# Patient Record
Sex: MALE | Race: WHITE | NOT HISPANIC OR LATINO | Employment: OTHER | URBAN - METROPOLITAN AREA
[De-identification: names, ages, dates, MRNs, and addresses within clinical notes are randomized per-mention and may not be internally consistent; named-entity substitution may affect disease eponyms.]

---

## 2023-04-14 LAB
ALANINE AMINOTRANSFERASE (SGPT) (U/L) IN SER/PLAS: 27 U/L (ref 10–52)
ANION GAP IN SER/PLAS: 17 MMOL/L (ref 10–20)
CALCIUM (MG/DL) IN SER/PLAS: 9.7 MG/DL (ref 8.6–10.3)
CARBON DIOXIDE, TOTAL (MMOL/L) IN SER/PLAS: 23 MMOL/L (ref 21–32)
CHLORIDE (MMOL/L) IN SER/PLAS: 103 MMOL/L (ref 98–107)
CHOLESTEROL (MG/DL) IN SER/PLAS: 159 MG/DL (ref 0–199)
CHOLESTEROL IN HDL (MG/DL) IN SER/PLAS: 54.3 MG/DL
CHOLESTEROL/HDL RATIO: 2.9
CREATININE (MG/DL) IN SER/PLAS: 0.94 MG/DL (ref 0.5–1.3)
ERYTHROCYTE DISTRIBUTION WIDTH (RATIO) BY AUTOMATED COUNT: 14.5 % (ref 11.5–14.5)
ERYTHROCYTE MEAN CORPUSCULAR HEMOGLOBIN CONCENTRATION (G/DL) BY AUTOMATED: 32.5 G/DL (ref 32–36)
ERYTHROCYTE MEAN CORPUSCULAR VOLUME (FL) BY AUTOMATED COUNT: 95 FL (ref 80–100)
ERYTHROCYTES (10*6/UL) IN BLOOD BY AUTOMATED COUNT: 4.82 X10E12/L (ref 4.5–5.9)
GFR MALE: 84 ML/MIN/1.73M2
GLUCOSE (MG/DL) IN SER/PLAS: 70 MG/DL (ref 74–99)
HEMATOCRIT (%) IN BLOOD BY AUTOMATED COUNT: 45.8 % (ref 41–52)
HEMOGLOBIN (G/DL) IN BLOOD: 14.9 G/DL (ref 13.5–17.5)
LDL: 75 MG/DL (ref 0–99)
LEUKOCYTES (10*3/UL) IN BLOOD BY AUTOMATED COUNT: 7.9 X10E9/L (ref 4.4–11.3)
PLATELETS (10*3/UL) IN BLOOD AUTOMATED COUNT: 297 X10E9/L (ref 150–450)
POTASSIUM (MMOL/L) IN SER/PLAS: 3.7 MMOL/L (ref 3.5–5.3)
SODIUM (MMOL/L) IN SER/PLAS: 139 MMOL/L (ref 136–145)
THYROTROPIN (MIU/L) IN SER/PLAS BY DETECTION LIMIT <= 0.05 MIU/L: 1.54 MIU/L (ref 0.44–3.98)
TRIGLYCERIDE (MG/DL) IN SER/PLAS: 151 MG/DL (ref 0–149)
UREA NITROGEN (MG/DL) IN SER/PLAS: 33 MG/DL (ref 6–23)
VLDL: 30 MG/DL (ref 0–40)

## 2023-04-18 ENCOUNTER — OFFICE VISIT (OUTPATIENT)
Dept: PRIMARY CARE | Facility: CLINIC | Age: 78
End: 2023-04-18
Payer: MEDICARE

## 2023-04-18 VITALS
OXYGEN SATURATION: 97 % | DIASTOLIC BLOOD PRESSURE: 74 MMHG | TEMPERATURE: 98.4 F | WEIGHT: 215.2 LBS | HEART RATE: 77 BPM | SYSTOLIC BLOOD PRESSURE: 118 MMHG | RESPIRATION RATE: 16 BRPM | BODY MASS INDEX: 32.72 KG/M2

## 2023-04-18 DIAGNOSIS — M19.90 OSTEOARTHRITIS, UNSPECIFIED OSTEOARTHRITIS TYPE, UNSPECIFIED SITE: ICD-10-CM

## 2023-04-18 DIAGNOSIS — I73.9 PERIPHERAL VASCULAR DISEASE (CMS-HCC): Chronic | ICD-10-CM

## 2023-04-18 DIAGNOSIS — I10 ESSENTIAL HYPERTENSION WITH GOAL BLOOD PRESSURE LESS THAN 130/85: ICD-10-CM

## 2023-04-18 DIAGNOSIS — R93.1 AGATSTON CORONARY ARTERY CALCIUM SCORE GREATER THAN 400: ICD-10-CM

## 2023-04-18 DIAGNOSIS — E66.9 CLASS 1 OBESITY WITH SERIOUS COMORBIDITY AND BODY MASS INDEX (BMI) OF 32.0 TO 32.9 IN ADULT, UNSPECIFIED OBESITY TYPE: Chronic | ICD-10-CM

## 2023-04-18 DIAGNOSIS — K21.9 GASTROESOPHAGEAL REFLUX DISEASE WITHOUT ESOPHAGITIS: ICD-10-CM

## 2023-04-18 DIAGNOSIS — Z95.5 PRESENCE OF DRUG COATED STENT IN LAD CORONARY ARTERY: ICD-10-CM

## 2023-04-18 DIAGNOSIS — C61 PROSTATE CANCER (MULTI): Chronic | ICD-10-CM

## 2023-04-18 DIAGNOSIS — Z97.3 WEARS GLASSES: ICD-10-CM

## 2023-04-18 DIAGNOSIS — M23.92 INTERNAL DERANGEMENT OF LEFT KNEE: ICD-10-CM

## 2023-04-18 DIAGNOSIS — I25.118 CORONARY ARTERY DISEASE OF NATIVE ARTERY OF NATIVE HEART WITH STABLE ANGINA PECTORIS (CMS-HCC): Chronic | ICD-10-CM

## 2023-04-18 DIAGNOSIS — Z00.00 ROUTINE GENERAL MEDICAL EXAMINATION AT HEALTH CARE FACILITY: Primary | Chronic | ICD-10-CM

## 2023-04-18 DIAGNOSIS — D12.6 ADENOMATOUS POLYP OF COLON, UNSPECIFIED PART OF COLON: ICD-10-CM

## 2023-04-18 PROBLEM — S16.1XXA CERVICAL STRAIN: Status: ACTIVE | Noted: 2023-04-18

## 2023-04-18 PROBLEM — J30.9 ALLERGIC RHINITIS: Status: ACTIVE | Noted: 2023-04-18

## 2023-04-18 PROBLEM — M53.3 SACROILIAC PAIN: Status: ACTIVE | Noted: 2023-04-18

## 2023-04-18 PROBLEM — T81.81XA COMPLICATION OF VENTILATION THERAPY: Status: ACTIVE | Noted: 2023-04-18

## 2023-04-18 PROBLEM — K64.8 INTERNAL HEMORRHOIDS: Status: ACTIVE | Noted: 2023-04-18

## 2023-04-18 PROBLEM — J98.8 RESPIRATORY TRACT INFECTION DUE TO COVID-19 VIRUS: Status: ACTIVE | Noted: 2023-04-18

## 2023-04-18 PROBLEM — R12 HEARTBURN: Status: ACTIVE | Noted: 2023-04-18

## 2023-04-18 PROBLEM — R19.7 DIARRHEA: Status: ACTIVE | Noted: 2023-04-18

## 2023-04-18 PROBLEM — J32.9 SINUSITIS: Status: ACTIVE | Noted: 2023-04-18

## 2023-04-18 PROBLEM — L98.9 SKIN LESION: Status: ACTIVE | Noted: 2023-04-18

## 2023-04-18 PROBLEM — R51.9 FRONTAL HEADACHE: Status: ACTIVE | Noted: 2023-04-18

## 2023-04-18 PROBLEM — K44.9 HIATAL HERNIA: Status: ACTIVE | Noted: 2023-04-18

## 2023-04-18 PROBLEM — H53.71 GLARE SENSITIVITY: Status: ACTIVE | Noted: 2023-04-18

## 2023-04-18 PROBLEM — R51.9 HEAD ACHE: Status: ACTIVE | Noted: 2023-04-18

## 2023-04-18 PROBLEM — M94.0 COSTOCHONDRITIS: Status: ACTIVE | Noted: 2023-04-18

## 2023-04-18 PROBLEM — E87.6 HYPOKALEMIA: Status: ACTIVE | Noted: 2023-04-18

## 2023-04-18 PROBLEM — K43.9 VENTRAL HERNIA: Status: ACTIVE | Noted: 2023-04-18

## 2023-04-18 PROBLEM — E78.5 HYPERLIPIDEMIA: Status: ACTIVE | Noted: 2023-04-18

## 2023-04-18 PROBLEM — B35.4 TINEA CORPORIS: Status: ACTIVE | Noted: 2023-04-18

## 2023-04-18 PROBLEM — K40.90 RIGHT INGUINAL HERNIA: Status: ACTIVE | Noted: 2023-04-18

## 2023-04-18 PROBLEM — K59.09 CHRONIC CONSTIPATION: Status: ACTIVE | Noted: 2023-04-18

## 2023-04-18 PROBLEM — U07.1 RESPIRATORY TRACT INFECTION DUE TO COVID-19 VIRUS: Status: ACTIVE | Noted: 2023-04-18

## 2023-04-18 PROBLEM — R07.89 ATYPICAL CHEST PAIN: Status: ACTIVE | Noted: 2023-04-18

## 2023-04-18 PROBLEM — R22.41 MASS OF RIGHT LOWER EXTREMITY: Status: ACTIVE | Noted: 2023-04-18

## 2023-04-18 PROBLEM — G47.00 INSOMNIA: Status: ACTIVE | Noted: 2023-04-18

## 2023-04-18 PROBLEM — E66.3 OVERWEIGHT (BMI 25.0-29.9): Status: ACTIVE | Noted: 2023-04-18

## 2023-04-18 PROBLEM — H61.21 IMPACTED CERUMEN OF RIGHT EAR: Status: ACTIVE | Noted: 2023-04-18

## 2023-04-18 PROBLEM — M89.8X9 BONY GROWTH: Status: ACTIVE | Noted: 2023-04-18

## 2023-04-18 PROBLEM — N40.0 BENIGN ENLARGEMENT OF PROSTATE: Status: ACTIVE | Noted: 2023-04-18

## 2023-04-18 PROBLEM — R60.9 EDEMA: Status: ACTIVE | Noted: 2023-04-18

## 2023-04-18 PROBLEM — M89.8X6 TIBIAL MASS: Status: ACTIVE | Noted: 2023-04-18

## 2023-04-18 PROBLEM — M54.50 BACK PAIN, LUMBOSACRAL: Status: ACTIVE | Noted: 2023-04-18

## 2023-04-18 PROBLEM — R79.89 TSH ELEVATION: Status: ACTIVE | Noted: 2023-04-18

## 2023-04-18 PROBLEM — R10.13 ABDOMINAL PAIN, EPIGASTRIC: Status: ACTIVE | Noted: 2023-04-18

## 2023-04-18 PROBLEM — H26.9 EARLY CATARACTS, BILATERAL: Status: ACTIVE | Noted: 2023-04-18

## 2023-04-18 PROBLEM — J39.9 UPPER RESPIRATORY DISEASE: Status: ACTIVE | Noted: 2023-04-18

## 2023-04-18 PROBLEM — G47.33 OBSTRUCTIVE SLEEP APNEA SYNDROME: Status: ACTIVE | Noted: 2023-04-18

## 2023-04-18 PROBLEM — M54.32 SCIATICA OF LEFT SIDE: Status: ACTIVE | Noted: 2023-04-18

## 2023-04-18 PROCEDURE — 1159F MED LIST DOCD IN RCRD: CPT | Performed by: INTERNAL MEDICINE

## 2023-04-18 PROCEDURE — 3074F SYST BP LT 130 MM HG: CPT | Performed by: INTERNAL MEDICINE

## 2023-04-18 PROCEDURE — 1170F FXNL STATUS ASSESSED: CPT | Performed by: INTERNAL MEDICINE

## 2023-04-18 PROCEDURE — 3078F DIAST BP <80 MM HG: CPT | Performed by: INTERNAL MEDICINE

## 2023-04-18 PROCEDURE — G0444 DEPRESSION SCREEN ANNUAL: HCPCS | Performed by: INTERNAL MEDICINE

## 2023-04-18 PROCEDURE — G0439 PPPS, SUBSEQ VISIT: HCPCS | Performed by: INTERNAL MEDICINE

## 2023-04-18 PROCEDURE — 1036F TOBACCO NON-USER: CPT | Performed by: INTERNAL MEDICINE

## 2023-04-18 PROCEDURE — 99214 OFFICE O/P EST MOD 30 MIN: CPT | Performed by: INTERNAL MEDICINE

## 2023-04-18 PROCEDURE — 1160F RVW MEDS BY RX/DR IN RCRD: CPT | Performed by: INTERNAL MEDICINE

## 2023-04-18 RX ORDER — OMEPRAZOLE 40 MG/1
CAPSULE, DELAYED RELEASE ORAL
COMMUNITY
Start: 2013-09-06 | End: 2023-07-18 | Stop reason: SDUPTHER

## 2023-04-18 RX ORDER — CETIRIZINE HYDROCHLORIDE 5 MG/1
5 TABLET ORAL DAILY PRN
Status: SHIPPED | COMMUNITY
Start: 2023-04-18 | End: 2023-04-18 | Stop reason: DRUGHIGH

## 2023-04-18 RX ORDER — MOLNUPIRAVIR 200 MG/1
CAPSULE ORAL EVERY 12 HOURS
COMMUNITY
Start: 2022-07-23 | End: 2023-04-18 | Stop reason: ALTCHOICE

## 2023-04-18 RX ORDER — FAMOTIDINE 40 MG/1
TABLET, FILM COATED ORAL
COMMUNITY
Start: 2021-10-13 | End: 2023-10-10 | Stop reason: ALTCHOICE

## 2023-04-18 RX ORDER — LOSARTAN POTASSIUM 50 MG/1
1 TABLET ORAL DAILY
COMMUNITY
Start: 2020-10-20 | End: 2023-11-13 | Stop reason: SDUPTHER

## 2023-04-18 RX ORDER — TAMSULOSIN HYDROCHLORIDE 0.4 MG/1
1 CAPSULE ORAL DAILY
COMMUNITY
Start: 2022-05-25 | End: 2023-04-18 | Stop reason: ALTCHOICE

## 2023-04-18 RX ORDER — FLUTICASONE PROPIONATE 50 MCG
1 SPRAY, SUSPENSION (ML) NASAL 2 TIMES DAILY
COMMUNITY
Start: 2012-01-23

## 2023-04-18 RX ORDER — PREDNISONE 10 MG/1
TABLET ORAL
COMMUNITY
Start: 2022-12-16 | End: 2023-04-18 | Stop reason: ALTCHOICE

## 2023-04-18 RX ORDER — ASPIRIN 81 MG/1
1 TABLET ORAL DAILY
COMMUNITY
End: 2024-05-06 | Stop reason: HOSPADM

## 2023-04-18 RX ORDER — ACETAMINOPHEN 500 MG
1 TABLET ORAL DAILY
COMMUNITY
Start: 2013-10-02

## 2023-04-18 RX ORDER — CETIRIZINE HYDROCHLORIDE 5 MG/1
10 TABLET ORAL DAILY PRN
Status: SHIPPED | COMMUNITY
Start: 2023-04-18

## 2023-04-18 RX ORDER — CETIRIZINE HYDROCHLORIDE 5 MG/1
1 TABLET ORAL DAILY
COMMUNITY
End: 2023-04-18 | Stop reason: DRUGHIGH

## 2023-04-18 RX ORDER — ATORVASTATIN CALCIUM 80 MG/1
TABLET, FILM COATED ORAL
COMMUNITY
Start: 2020-11-06 | End: 2023-06-03

## 2023-04-18 RX ORDER — CIPROFLOXACIN 250 MG/1
1 TABLET, FILM COATED ORAL 2 TIMES DAILY
COMMUNITY
Start: 2023-01-25 | End: 2023-04-18 | Stop reason: ALTCHOICE

## 2023-04-18 ASSESSMENT — ENCOUNTER SYMPTOMS
DEPRESSION: 0
LOSS OF SENSATION IN FEET: 0
OCCASIONAL FEELINGS OF UNSTEADINESS: 0

## 2023-04-18 ASSESSMENT — ACTIVITIES OF DAILY LIVING (ADL)
FEEDING YOURSELF: INDEPENDENT
HEARING - LEFT EAR: FUNCTIONAL
JUDGMENT_ADEQUATE_SAFELY_COMPLETE_DAILY_ACTIVITIES: YES
WALKS IN HOME: INDEPENDENT
BATHING: INDEPENDENT
ADEQUATE_TO_COMPLETE_ADL: YES
DRESSING YOURSELF: INDEPENDENT
GROOMING: INDEPENDENT
PATIENT'S MEMORY ADEQUATE TO SAFELY COMPLETE DAILY ACTIVITIES?: YES
HEARING - RIGHT EAR: FUNCTIONAL
TOILETING: INDEPENDENT
ASSISTIVE_DEVICE: EYEGLASSES

## 2023-04-18 ASSESSMENT — PATIENT HEALTH QUESTIONNAIRE - PHQ9
1. LITTLE INTEREST OR PLEASURE IN DOING THINGS: NOT AT ALL
2. FEELING DOWN, DEPRESSED OR HOPELESS: NOT AT ALL
SUM OF ALL RESPONSES TO PHQ9 QUESTIONS 1 AND 2: 0

## 2023-04-18 NOTE — PROGRESS NOTES
Subjective   Reason for Visit: Ebenezer Campbell is an 77 y.o. male here for a Medicare Wellness visit.     Past Medical, Surgical, and Family History reviewed and updated in chart.         Wellness visit overall doing well-anticipating prostate cancer treatment-likely radiation to start at some point following his recent MRI and PET scan            Patient Care Team:  Oral Castellano MD as PCP - General  Oral Castellano MD as PCP - [facility] Attributed Provider     Review of Systems    Objective   Vitals:  /74 (BP Location: Left arm, Patient Position: Sitting)   Pulse 77   Temp 36.9 °C (98.4 °F)   Resp 16   Wt 97.6 kg (215 lb 3.2 oz)   SpO2 97%   BMI 32.72 kg/m²       Physical Exam  Vitals reviewed.   Constitutional:       Appearance: Normal appearance.   HENT:      Head: Normocephalic and atraumatic.   Eyes:      General: No scleral icterus.        Right eye: No discharge.         Left eye: No discharge.      Extraocular Movements: Extraocular movements intact.      Conjunctiva/sclera: Conjunctivae normal.      Pupils: Pupils are equal, round, and reactive to light.   Cardiovascular:      Rate and Rhythm: Normal rate and regular rhythm.      Pulses: Normal pulses.      Heart sounds: Normal heart sounds. No murmur heard.  Pulmonary:      Effort: Pulmonary effort is normal.      Breath sounds: Normal breath sounds. No wheezing or rhonchi.   Musculoskeletal:         General: No deformity or signs of injury. Normal range of motion.      Cervical back: Normal range of motion and neck supple. No rigidity or tenderness.   Lymphadenopathy:      Cervical: No cervical adenopathy.   Skin:     General: Skin is warm and dry.      Findings: No rash.   Neurological:      General: No focal deficit present.      Mental Status: He is alert and oriented to person, place, and time. Mental status is at baseline.      Cranial Nerves: No cranial nerve deficit.      Sensory: No sensory deficit.      Gait: Gait normal.   Psychiatric:          Mood and Affect: Mood normal.         Behavior: Behavior normal.         Thought Content: Thought content normal.         Judgment: Judgment normal.         Assessment/Plan   Problem List Items Addressed This Visit          Circulatory    Agatston coronary artery calcium score greater than 400    Coronary artery disease of native heart with stable angina pectoris (CMS/HCC)    Essential hypertension with goal blood pressure less than 130/85    Peripheral vascular disease (CMS/HCC)    Presence of drug coated stent in LAD coronary artery       Digestive    Adenomatous colon polyp    Esophageal reflux       Genitourinary    Prostate cancer (CMS/HCC)       Musculoskeletal    Internal derangement of left knee    Osteoarthritis       Other    Wears glasses     Other Visit Diagnoses       Routine general medical examination at health care facility  (Chronic)   -  Primary    Relevant Orders    1 Year Follow Up In [facility] - PCP - Wellness Exam    Class 1 obesity with serious comorbidity and body mass index (BMI) of 32.0 to 32.9 in adult, unspecified obesity type  (Chronic)                    He will follow-up with radiation oncology to review his recent MRI and PET scan and anticipate prostate cancer treatment subsequently    Cardiology care-he will continue with his cardiologist and continue his cardiac medication plan    Arthritis-analgesics as needed, and lifestyle efforts to avoid overuse encouraged. Follow up with new or persistent joint pain encouraged.

## 2023-04-19 PROBLEM — M94.0 COSTOCHONDRITIS: Status: RESOLVED | Noted: 2023-04-18 | Resolved: 2023-04-19

## 2023-04-19 PROBLEM — J98.8 RESPIRATORY TRACT INFECTION DUE TO COVID-19 VIRUS: Status: RESOLVED | Noted: 2023-04-18 | Resolved: 2023-04-19

## 2023-04-19 PROBLEM — R51.9 FRONTAL HEADACHE: Status: RESOLVED | Noted: 2023-04-18 | Resolved: 2023-04-19

## 2023-04-19 PROBLEM — U07.1 RESPIRATORY TRACT INFECTION DUE TO COVID-19 VIRUS: Status: RESOLVED | Noted: 2023-04-18 | Resolved: 2023-04-19

## 2023-04-19 PROBLEM — S16.1XXA CERVICAL STRAIN: Status: RESOLVED | Noted: 2023-04-18 | Resolved: 2023-04-19

## 2023-04-19 PROBLEM — R51.9 HEAD ACHE: Status: RESOLVED | Noted: 2023-04-18 | Resolved: 2023-04-19

## 2023-04-19 PROBLEM — M89.8X9 BONY GROWTH: Status: RESOLVED | Noted: 2023-04-18 | Resolved: 2023-04-19

## 2023-04-19 PROBLEM — R10.13 ABDOMINAL PAIN, EPIGASTRIC: Status: RESOLVED | Noted: 2023-04-18 | Resolved: 2023-04-19

## 2023-04-19 PROBLEM — G47.00 INSOMNIA: Status: RESOLVED | Noted: 2023-04-18 | Resolved: 2023-04-19

## 2023-04-25 LAB
ALANINE AMINOTRANSFERASE (SGPT) (U/L) IN SER/PLAS: 23 U/L (ref 10–52)
ALBUMIN (G/DL) IN SER/PLAS: 4.2 G/DL (ref 3.4–5)
ALKALINE PHOSPHATASE (U/L) IN SER/PLAS: 88 U/L (ref 33–136)
ANION GAP IN SER/PLAS: 13 MMOL/L (ref 10–20)
ASPARTATE AMINOTRANSFERASE (SGOT) (U/L) IN SER/PLAS: 22 U/L (ref 9–39)
BASOPHILS (10*3/UL) IN BLOOD BY AUTOMATED COUNT: 0.09 X10E9/L (ref 0–0.1)
BASOPHILS/100 LEUKOCYTES IN BLOOD BY AUTOMATED COUNT: 1.4 % (ref 0–2)
BILIRUBIN TOTAL (MG/DL) IN SER/PLAS: 0.7 MG/DL (ref 0–1.2)
CALCIUM (MG/DL) IN SER/PLAS: 9.8 MG/DL (ref 8.6–10.3)
CARBON DIOXIDE, TOTAL (MMOL/L) IN SER/PLAS: 28 MMOL/L (ref 21–32)
CHLORIDE (MMOL/L) IN SER/PLAS: 102 MMOL/L (ref 98–107)
CREATININE (MG/DL) IN SER/PLAS: 0.99 MG/DL (ref 0.5–1.3)
EOSINOPHILS (10*3/UL) IN BLOOD BY AUTOMATED COUNT: 0.28 X10E9/L (ref 0–0.4)
EOSINOPHILS/100 LEUKOCYTES IN BLOOD BY AUTOMATED COUNT: 4.5 % (ref 0–6)
ERYTHROCYTE DISTRIBUTION WIDTH (RATIO) BY AUTOMATED COUNT: 13.9 % (ref 11.5–14.5)
ERYTHROCYTE MEAN CORPUSCULAR HEMOGLOBIN CONCENTRATION (G/DL) BY AUTOMATED: 32 G/DL (ref 32–36)
ERYTHROCYTE MEAN CORPUSCULAR VOLUME (FL) BY AUTOMATED COUNT: 95 FL (ref 80–100)
ERYTHROCYTES (10*6/UL) IN BLOOD BY AUTOMATED COUNT: 4.57 X10E12/L (ref 4.5–5.9)
GFR MALE: 79 ML/MIN/1.73M2
GLUCOSE (MG/DL) IN SER/PLAS: 98 MG/DL (ref 74–99)
HEMATOCRIT (%) IN BLOOD BY AUTOMATED COUNT: 43.4 % (ref 41–52)
HEMOGLOBIN (G/DL) IN BLOOD: 13.9 G/DL (ref 13.5–17.5)
IMMATURE GRANULOCYTES/100 LEUKOCYTES IN BLOOD BY AUTOMATED COUNT: 0.2 % (ref 0–0.9)
LEUKOCYTES (10*3/UL) IN BLOOD BY AUTOMATED COUNT: 6.2 X10E9/L (ref 4.4–11.3)
LYMPHOCYTES (10*3/UL) IN BLOOD BY AUTOMATED COUNT: 2.31 X10E9/L (ref 0.8–3)
LYMPHOCYTES/100 LEUKOCYTES IN BLOOD BY AUTOMATED COUNT: 37.1 % (ref 13–44)
MONOCYTES (10*3/UL) IN BLOOD BY AUTOMATED COUNT: 0.67 X10E9/L (ref 0.05–0.8)
MONOCYTES/100 LEUKOCYTES IN BLOOD BY AUTOMATED COUNT: 10.8 % (ref 2–10)
NEUTROPHILS (10*3/UL) IN BLOOD BY AUTOMATED COUNT: 2.87 X10E9/L (ref 1.6–5.5)
NEUTROPHILS/100 LEUKOCYTES IN BLOOD BY AUTOMATED COUNT: 46 % (ref 40–80)
PLATELETS (10*3/UL) IN BLOOD AUTOMATED COUNT: 301 X10E9/L (ref 150–450)
POTASSIUM (MMOL/L) IN SER/PLAS: 3.7 MMOL/L (ref 3.5–5.3)
PROSTATE SPECIFIC AG (NG/ML) IN SER/PLAS: 8.37 NG/ML (ref 0–4)
PROTEIN TOTAL: 6.8 G/DL (ref 6.4–8.2)
SODIUM (MMOL/L) IN SER/PLAS: 139 MMOL/L (ref 136–145)
TESTOSTERONE (NG/DL) IN SER/PLAS: 827 NG/DL (ref 240–1000)
UREA NITROGEN (MG/DL) IN SER/PLAS: 22 MG/DL (ref 6–23)

## 2023-06-02 DIAGNOSIS — E78.5 HYPERLIPIDEMIA, UNSPECIFIED: ICD-10-CM

## 2023-06-02 DIAGNOSIS — E78.5 DYSLIPIDEMIA, GOAL LDL BELOW 70: Primary | ICD-10-CM

## 2023-06-03 RX ORDER — ATORVASTATIN CALCIUM 80 MG/1
TABLET, FILM COATED ORAL
Qty: 90 TABLET | Refills: 3 | Status: SHIPPED | OUTPATIENT
Start: 2023-06-03 | End: 2024-05-23

## 2023-06-26 LAB
APPEARANCE, URINE: CLEAR
BILIRUBIN, URINE: NEGATIVE
BLOOD, URINE: NEGATIVE
COLOR, URINE: YELLOW
GLUCOSE, URINE: NEGATIVE MG/DL
KETONES, URINE: NEGATIVE MG/DL
LEUKOCYTE ESTERASE, URINE: ABNORMAL
NITRITE, URINE: NEGATIVE
PH, URINE: 7 (ref 5–8)
PROTEIN, URINE: NEGATIVE MG/DL
RBC, URINE: 1 /HPF (ref 0–5)
SPECIFIC GRAVITY, URINE: 1.01 (ref 1–1.03)
UROBILINOGEN, URINE: <2 MG/DL (ref 0–1.9)
WBC, URINE: 4 /HPF (ref 0–5)

## 2023-06-27 LAB — URINE CULTURE: NO GROWTH

## 2023-07-18 DIAGNOSIS — K21.9 GASTROESOPHAGEAL REFLUX DISEASE WITHOUT ESOPHAGITIS: Primary | ICD-10-CM

## 2023-07-18 RX ORDER — OMEPRAZOLE 40 MG/1
40 CAPSULE, DELAYED RELEASE ORAL
Qty: 30 CAPSULE | Refills: 0 | Status: SHIPPED | OUTPATIENT
Start: 2023-07-18 | End: 2023-08-11

## 2023-07-25 LAB — PROSTATE SPECIFIC AG (NG/ML) IN SER/PLAS: 0.12 NG/ML (ref 0–4)

## 2023-08-11 DIAGNOSIS — K21.9 GASTROESOPHAGEAL REFLUX DISEASE WITHOUT ESOPHAGITIS: ICD-10-CM

## 2023-08-11 RX ORDER — OMEPRAZOLE 40 MG/1
40 CAPSULE, DELAYED RELEASE ORAL
Qty: 90 CAPSULE | Refills: 0 | Status: SHIPPED | OUTPATIENT
Start: 2023-08-11 | End: 2023-11-06

## 2023-08-18 PROBLEM — E66.9 CLASS 1 OBESITY WITH BODY MASS INDEX (BMI) OF 33.0 TO 33.9 IN ADULT: Status: ACTIVE | Noted: 2023-08-18

## 2023-08-18 PROBLEM — E66.811 CLASS 1 OBESITY WITH BODY MASS INDEX (BMI) OF 33.0 TO 33.9 IN ADULT: Status: ACTIVE | Noted: 2023-08-18

## 2023-08-18 RX ORDER — PRAVASTATIN SODIUM 40 MG/1
TABLET ORAL
COMMUNITY
End: 2023-10-10

## 2023-08-18 RX ORDER — TAMSULOSIN HYDROCHLORIDE 0.4 MG/1
0.4 CAPSULE ORAL
COMMUNITY
Start: 2023-06-26 | End: 2024-04-09 | Stop reason: DRUGHIGH

## 2023-08-23 LAB
ALANINE AMINOTRANSFERASE (SGPT) (U/L) IN SER/PLAS: 21 U/L (ref 10–52)
ALBUMIN (G/DL) IN SER/PLAS: 3.8 G/DL (ref 3.4–5)
ALKALINE PHOSPHATASE (U/L) IN SER/PLAS: 75 U/L (ref 33–136)
ANION GAP IN SER/PLAS: 12 MMOL/L (ref 10–20)
ASPARTATE AMINOTRANSFERASE (SGOT) (U/L) IN SER/PLAS: 24 U/L (ref 9–39)
BASOPHILS (10*3/UL) IN BLOOD BY AUTOMATED COUNT: 0.06 X10E9/L (ref 0–0.1)
BASOPHILS/100 LEUKOCYTES IN BLOOD BY AUTOMATED COUNT: 1.4 % (ref 0–2)
BILIRUBIN TOTAL (MG/DL) IN SER/PLAS: 0.8 MG/DL (ref 0–1.2)
CALCIUM (MG/DL) IN SER/PLAS: 9.1 MG/DL (ref 8.6–10.3)
CARBON DIOXIDE, TOTAL (MMOL/L) IN SER/PLAS: 28 MMOL/L (ref 21–32)
CHLORIDE (MMOL/L) IN SER/PLAS: 104 MMOL/L (ref 98–107)
CREATININE (MG/DL) IN SER/PLAS: 1.01 MG/DL (ref 0.5–1.3)
EOSINOPHILS (10*3/UL) IN BLOOD BY AUTOMATED COUNT: 0.54 X10E9/L (ref 0–0.4)
EOSINOPHILS/100 LEUKOCYTES IN BLOOD BY AUTOMATED COUNT: 12.3 % (ref 0–6)
ERYTHROCYTE DISTRIBUTION WIDTH (RATIO) BY AUTOMATED COUNT: 15 % (ref 11.5–14.5)
ERYTHROCYTE MEAN CORPUSCULAR HEMOGLOBIN CONCENTRATION (G/DL) BY AUTOMATED: 33 G/DL (ref 32–36)
ERYTHROCYTE MEAN CORPUSCULAR VOLUME (FL) BY AUTOMATED COUNT: 95 FL (ref 80–100)
ERYTHROCYTES (10*6/UL) IN BLOOD BY AUTOMATED COUNT: 3.75 X10E12/L (ref 4.5–5.9)
GFR MALE: 77 ML/MIN/1.73M2
GLUCOSE (MG/DL) IN SER/PLAS: 92 MG/DL (ref 74–99)
HEMATOCRIT (%) IN BLOOD BY AUTOMATED COUNT: 35.8 % (ref 41–52)
HEMOGLOBIN (G/DL) IN BLOOD: 11.8 G/DL (ref 13.5–17.5)
IMMATURE GRANULOCYTES/100 LEUKOCYTES IN BLOOD BY AUTOMATED COUNT: 0.2 % (ref 0–0.9)
LEUKOCYTES (10*3/UL) IN BLOOD BY AUTOMATED COUNT: 4.4 X10E9/L (ref 4.4–11.3)
LYMPHOCYTES (10*3/UL) IN BLOOD BY AUTOMATED COUNT: 1.56 X10E9/L (ref 0.8–3)
LYMPHOCYTES/100 LEUKOCYTES IN BLOOD BY AUTOMATED COUNT: 35.5 % (ref 13–44)
MONOCYTES (10*3/UL) IN BLOOD BY AUTOMATED COUNT: 0.52 X10E9/L (ref 0.05–0.8)
MONOCYTES/100 LEUKOCYTES IN BLOOD BY AUTOMATED COUNT: 11.8 % (ref 2–10)
NEUTROPHILS (10*3/UL) IN BLOOD BY AUTOMATED COUNT: 1.7 X10E9/L (ref 1.6–5.5)
NEUTROPHILS/100 LEUKOCYTES IN BLOOD BY AUTOMATED COUNT: 38.8 % (ref 40–80)
PLATELETS (10*3/UL) IN BLOOD AUTOMATED COUNT: 224 X10E9/L (ref 150–450)
POTASSIUM (MMOL/L) IN SER/PLAS: 3.6 MMOL/L (ref 3.5–5.3)
PROSTATE SPECIFIC AG (NG/ML) IN SER/PLAS: 0.01 NG/ML (ref 0–4)
PROTEIN TOTAL: 6.3 G/DL (ref 6.4–8.2)
SODIUM (MMOL/L) IN SER/PLAS: 140 MMOL/L (ref 136–145)
TESTOSTERONE (NG/DL) IN SER/PLAS: <60 NG/DL (ref 240–1000)
UREA NITROGEN (MG/DL) IN SER/PLAS: 27 MG/DL (ref 6–23)

## 2023-09-20 VITALS — BODY MASS INDEX: 30.86 KG/M2 | HEIGHT: 69 IN | WEIGHT: 208.34 LBS

## 2023-09-20 DIAGNOSIS — C61 PROSTATE CANCER (MULTI): Primary | ICD-10-CM

## 2023-10-10 ENCOUNTER — OFFICE VISIT (OUTPATIENT)
Dept: PRIMARY CARE | Facility: CLINIC | Age: 78
End: 2023-10-10
Payer: MEDICARE

## 2023-10-10 VITALS
RESPIRATION RATE: 16 BRPM | SYSTOLIC BLOOD PRESSURE: 112 MMHG | BODY MASS INDEX: 31.69 KG/M2 | TEMPERATURE: 98.1 F | HEART RATE: 65 BPM | WEIGHT: 215.4 LBS | DIASTOLIC BLOOD PRESSURE: 70 MMHG | OXYGEN SATURATION: 97 %

## 2023-10-10 DIAGNOSIS — E78.5 DYSLIPIDEMIA, GOAL LDL BELOW 70: Chronic | ICD-10-CM

## 2023-10-10 DIAGNOSIS — Z95.5 PRESENCE OF DRUG COATED STENT IN LAD CORONARY ARTERY: Chronic | ICD-10-CM

## 2023-10-10 DIAGNOSIS — M19.90 OSTEOARTHRITIS, UNSPECIFIED OSTEOARTHRITIS TYPE, UNSPECIFIED SITE: Chronic | ICD-10-CM

## 2023-10-10 DIAGNOSIS — I10 ESSENTIAL HYPERTENSION WITH GOAL BLOOD PRESSURE LESS THAN 130/85: Chronic | ICD-10-CM

## 2023-10-10 DIAGNOSIS — E55.9 VITAMIN D DEFICIENCY: Chronic | ICD-10-CM

## 2023-10-10 DIAGNOSIS — E66.9 CLASS 1 OBESITY WITH SERIOUS COMORBIDITY AND BODY MASS INDEX (BMI) OF 31.0 TO 31.9 IN ADULT, UNSPECIFIED OBESITY TYPE: Chronic | ICD-10-CM

## 2023-10-10 DIAGNOSIS — R13.10 DYSPHAGIA, UNSPECIFIED TYPE: Chronic | ICD-10-CM

## 2023-10-10 DIAGNOSIS — M79.671 HEEL PAIN, CHRONIC, RIGHT: Primary | Chronic | ICD-10-CM

## 2023-10-10 DIAGNOSIS — G89.29 HEEL PAIN, CHRONIC, RIGHT: Primary | Chronic | ICD-10-CM

## 2023-10-10 DIAGNOSIS — I25.118 CORONARY ARTERY DISEASE OF NATIVE ARTERY OF NATIVE HEART WITH STABLE ANGINA PECTORIS (CMS-HCC): Chronic | ICD-10-CM

## 2023-10-10 PROBLEM — E66.3 OVERWEIGHT (BMI 25.0-29.9): Status: RESOLVED | Noted: 2023-04-18 | Resolved: 2023-10-10

## 2023-10-10 PROBLEM — E66.811 CLASS 1 OBESITY WITH BODY MASS INDEX (BMI) OF 33.0 TO 33.9 IN ADULT: Status: RESOLVED | Noted: 2023-08-18 | Resolved: 2023-10-10

## 2023-10-10 PROBLEM — R79.89 TSH ELEVATION: Status: RESOLVED | Noted: 2023-04-18 | Resolved: 2023-10-10

## 2023-10-10 PROBLEM — Z86.79 H/O: HTN (HYPERTENSION): Status: ACTIVE | Noted: 2019-12-30

## 2023-10-10 PROBLEM — Z20.822 CLOSE EXPOSURE TO COVID-19 VIRUS: Status: RESOLVED | Noted: 2020-11-17 | Resolved: 2023-10-10

## 2023-10-10 PROBLEM — Z20.822 CLOSE EXPOSURE TO COVID-19 VIRUS: Status: ACTIVE | Noted: 2020-11-17

## 2023-10-10 PROBLEM — Z96.1 PSEUDOPHAKIA OF BOTH EYES: Status: ACTIVE | Noted: 2020-05-28

## 2023-10-10 PROCEDURE — 3078F DIAST BP <80 MM HG: CPT | Performed by: INTERNAL MEDICINE

## 2023-10-10 PROCEDURE — 1126F AMNT PAIN NOTED NONE PRSNT: CPT | Performed by: INTERNAL MEDICINE

## 2023-10-10 PROCEDURE — 1160F RVW MEDS BY RX/DR IN RCRD: CPT | Performed by: INTERNAL MEDICINE

## 2023-10-10 PROCEDURE — 1159F MED LIST DOCD IN RCRD: CPT | Performed by: INTERNAL MEDICINE

## 2023-10-10 PROCEDURE — 3074F SYST BP LT 130 MM HG: CPT | Performed by: INTERNAL MEDICINE

## 2023-10-10 PROCEDURE — 1036F TOBACCO NON-USER: CPT | Performed by: INTERNAL MEDICINE

## 2023-10-10 PROCEDURE — 99214 OFFICE O/P EST MOD 30 MIN: CPT | Performed by: INTERNAL MEDICINE

## 2023-10-10 ASSESSMENT — ENCOUNTER SYMPTOMS
OCCASIONAL FEELINGS OF UNSTEADINESS: 0
DEPRESSION: 0
LOSS OF SENSATION IN FEET: 0

## 2023-10-10 ASSESSMENT — PATIENT HEALTH QUESTIONNAIRE - PHQ9
SUM OF ALL RESPONSES TO PHQ9 QUESTIONS 1 AND 2: 0
2. FEELING DOWN, DEPRESSED OR HOPELESS: NOT AT ALL
1. LITTLE INTEREST OR PLEASURE IN DOING THINGS: NOT AT ALL

## 2023-10-10 NOTE — PROGRESS NOTES
Subjective   Patient ID: Ebenezer Campbell is a 77 y.o. male who presents for Follow-up.    Here for follow-up.  Overall doing well.  Several concerns    Right middle finger occasionally recently has felt sore and swollen.  Not locking up.    Every other month or so he has issues swallowing liquids.  It makes him cough a lot    He has been walking at times yesterday walk 4 miles right heel has been sore he has a remote Achilles rupture 1980s         Review of Systems    Objective   /70   Pulse 65   Temp 36.7 °C (98.1 °F)   Resp 16   Wt 97.7 kg (215 lb 6.4 oz)   SpO2 97%   BMI 31.69 kg/m²     Physical Exam  Vitals reviewed.   Constitutional:       Appearance: Normal appearance.   HENT:      Head: Normocephalic and atraumatic.   Eyes:      General: No scleral icterus.        Right eye: No discharge.         Left eye: No discharge.      Extraocular Movements: Extraocular movements intact.      Conjunctiva/sclera: Conjunctivae normal.      Pupils: Pupils are equal, round, and reactive to light.   Cardiovascular:      Rate and Rhythm: Normal rate and regular rhythm.      Pulses: Normal pulses.      Heart sounds: Normal heart sounds. No murmur heard.  Pulmonary:      Effort: Pulmonary effort is normal.      Breath sounds: Normal breath sounds. No wheezing or rhonchi.   Musculoskeletal:         General: No deformity or signs of injury. Normal range of motion.      Cervical back: Normal range of motion and neck supple. No rigidity or tenderness.      Comments: Right heel pain located at the insertion of the Achilles.  The Achilles tendon itself was nontender and otherwise unremarkable    Extensive osteoarthritic changes in his hands   Lymphadenopathy:      Cervical: No cervical adenopathy.   Skin:     General: Skin is warm and dry.      Findings: No rash.   Neurological:      General: No focal deficit present.      Mental Status: He is alert and oriented to person, place, and time. Mental status is at baseline.       Cranial Nerves: No cranial nerve deficit.      Sensory: No sensory deficit.      Gait: Gait normal.   Psychiatric:         Mood and Affect: Mood normal.         Behavior: Behavior normal.         Thought Content: Thought content normal.         Judgment: Judgment normal.         Assessment/Plan   Problem List Items Addressed This Visit             ICD-10-CM    Coronary artery disease of native heart with stable angina pectoris (CMS/HCC) I25.118    Essential hypertension with goal blood pressure less than 130/85 I10    Dyslipidemia, goal LDL below 70 E78.5    Relevant Orders    Lipid Panel    TSH with reflex to Free T4 if abnormal    Osteoarthritis M19.90    Presence of drug coated stent in LAD coronary artery Z95.5    Class 1 obesity with serious comorbidity and body mass index (BMI) of 31.0 to 31.9 in adult E66.9, Z68.31    Heel pain, chronic, right - Primary (Chronic) M79.671, G89.29    Relevant Orders    Referral to Podiatry    Dysphagia (Chronic) R13.10    Relevant Orders    Referral to Gastroenterology    Vitamin D deficiency E55.9    Relevant Orders    Vitamin D 25-Hydroxy,Total (for eval of Vitamin D levels)          Prostate cancer-treated with radiation therapy and then androgen deprivation therapy.  He is followed by Dr. Rincon in urology.  He is in a clinical trial.  He will continue per protocol.  He also plans to [place] back with his original urologist, Dr. Marin     Coronary artery disease status post cardiac stent placed 11/13/2020- he has done well following the procedure. At this point cardiac rehab has not deemed to be necessary he states.                He will continue his elliptical workouts and follow-up with his cardiologist, Dr. Sethi as directed     Elevated calcium score / widened aortic arch - calcium score = 1837 noted on Oct '20 CT calcium score. He'll see Dr. Pedro amado now semiannually-following his cardiac catheterization and stent placement November 2020.     Hypertension- BP  improved w/ losartan. He will continue healthy lifestyle efforts     Elevated weight- He will continue efforts accordingly. Unfortunately BMI remains in the 31 range. He will continue to work to improve this. He has been traveling a lot since his wife passed away. He will work towards weight loss in the new year.     Dyslipidemia he will continue the maximum atorvastatin following his cardiac stent placement.  Goal LDL under 70.           Lipids ordered for next time    Exercise routine-typically on his home recumbent cycle. He golfs- walks 2 x wk, and does yardwork as well   Presently back on his elliptical 4 times a week, for 30 minutes with a pulse goal around 125    Swallowing concerns-with liquids every month or so he states he chokes and feels like the liquid goes down the wrong way.  I recommended further evaluation with a GI consultation     Dyspepsia He does have a history of dyspepsia, with then EGD in May 2013 showing some acid erosions. Another EGD in April 2017 was unremarkable.            Presently without acid reflux issues      Disequilibrium-though he has a history of dysfunctional eustachian tubes that's presently not an issue. Encouraged ongoing Flonase and Zyrtec and the antibiotics for the sinusitis. He will use caution with position changes understanding that he could be at risk for falling if he is dizzy. He will notify me with ongoing concerns   Fortunately no balance problems of late     Headache syndrome- His CAT scan was nondiagnostic and cervical spine x-ray does confirm some arthritis. Presently headaches improved, and he canceled his neurology consultation.        Seasonal allergies. Suggested restarting Flonase twice daily and Zyrtec 10 mg daily for any sinus pressure        TSH- slightly elevated on February labs-he will reassess him in July. Reviewed his multivitamin which does have biotin and it but he states he is use this for years. Will check TSH before next time     RE lumbar  "spasms-this has happened on several instances, with x-ray showing lumbar arthritis July 2015 and again in November 2009. Without recent trauma we'll defer x-rays presently. Instructions written out to optimize compliance. He will use meloxicam for 7-10 days as the anti-inflammatory, Skelaxin nightly for a week or so for the muscle relaxer. Heating pad and stretching twice daily for 30 minutes recommended and follow-up with physical therapy to optimize long-term stretching routine. He will call if not improved   No back pain w/ weights routine        Peripheral vascular concerns with Elevated calcium score- carotid study unremarkable. Abdominal aortic aneurysm unremarkable November 2019. Carotid duplex unremarkable November 2020.           Presently asymptomatic without claudication symptoms    Osteoarthritis-advanced arthritic changes in his hands.  He will use caution with overuse     Cervical spasms-mainly left-sided-with some tension headaches left lateral lumbar spasms-including encouraged heating pad and stretch.     Right heel discomfort-improved presently. He did have an MRI- showing Achilles tendinitis among other findings-residual changes-he remembers he tore his Achilles age 24 in the right side.              10/23-he has had more heel pain posteriorly recently-discussed the possibility of bursitis.  He will [place] back with podiatry-Dr. Tellez       Feet pain-improved with orthotics.      History of left knee arthroscopy/DJD- not problematic with weight loss and elliptical workouts. Doing well.     Right shoulder - still occas \"twinge\" w/ certain movements, now daily. He will notify me if he would like further evaluation. Devon declined. Instead he'll do exercises. Fortunately normal range of motion without rotator cuff worrisome findings on exam.        Family Hx thoracic aneurysms - 2 older brothers - his chest CT early 2017 showed normal. Report provided for him        Acid reflux-less problematic " with weight loss. Occasionally problematic. EGD  completed without worrisome findings on biopsy.    He has since discontinued PPI. No active hiatal hernia issues fortunately. Encouraged him to use the PPI before triggers     Colon polyps - noted on  colonoscopy, and on  colonoscopy.Next in 5 yrs - Dec '24     Right inguinal hernia/ventral hernia/history of umbilical hernia repair-not problematic     Sleep apnea- presently not on CPAP. he rec'd new mask Rx per Dr. Viera spring '18, before he retired. He's not rec'd it and so remains off CPAP        Scalp skin lesions-encouraged dermatology consultation-he plans to see his wife, Dr. Ann     Dental care- encouraged semiannual dental visits.     Home itching-occasionally at night recently-history and exam nondiagnostic no new medications lotions soaps detergents suggested topical Benadryl and follow-up if not improved     Vision care / early cataracts - annual appt soon. He has a Rx for distance. Dr. Rashid recently noted cataracts have advanced a bit. Follow-up peripheral vascular concerns-w/ Dr. Jeremy Narvaez [facility]       Bereavement-support provided regarding the sudden passing of his wife with a dissecting aortic [place] clinic just before 2021. -He now has 8 grandchildren-living out of town he will continue to travel. Remains busy as he begins to de-cluttering his house-with 6 bedrooms he notes.              Looking forward to the holidays with his family       Living will and medical power of  completed forms reviewed/filed at his  visit   I suggested he update these as his  wife is listed as his primary agent on his medical power of         High dose flu shot annually after Labor Day- updated      Shingrix series completed     Covid series completed, booster too     Follow-up late April - after trips to [place] and [place] w/ his grandchildren, sooner as needed     Charting  was completed using voice recognition technology and may include unintended errors.     F

## 2023-10-11 PROBLEM — G89.29 HEEL PAIN, CHRONIC, RIGHT: Chronic | Status: ACTIVE | Noted: 2023-10-11

## 2023-10-11 PROBLEM — R13.10 DYSPHAGIA: Chronic | Status: ACTIVE | Noted: 2023-10-11

## 2023-10-11 PROBLEM — M79.671 HEEL PAIN, CHRONIC, RIGHT: Chronic | Status: ACTIVE | Noted: 2023-10-11

## 2023-10-11 PROBLEM — E55.9 VITAMIN D DEFICIENCY: Status: ACTIVE | Noted: 2023-10-11

## 2023-11-06 DIAGNOSIS — K21.9 GASTROESOPHAGEAL REFLUX DISEASE WITHOUT ESOPHAGITIS: ICD-10-CM

## 2023-11-06 RX ORDER — OMEPRAZOLE 40 MG/1
40 CAPSULE, DELAYED RELEASE ORAL
Qty: 90 CAPSULE | Refills: 3 | Status: SHIPPED | OUTPATIENT
Start: 2023-11-06

## 2023-11-13 DIAGNOSIS — I10 ESSENTIAL HYPERTENSION WITH GOAL BLOOD PRESSURE LESS THAN 130/85: Primary | ICD-10-CM

## 2023-11-14 RX ORDER — LOSARTAN POTASSIUM 50 MG/1
50 TABLET ORAL DAILY
Qty: 90 TABLET | Refills: 3 | Status: SHIPPED | OUTPATIENT
Start: 2023-11-14 | End: 2024-02-27 | Stop reason: SDUPTHER

## 2023-11-15 ENCOUNTER — LAB (OUTPATIENT)
Dept: LAB | Facility: LAB | Age: 78
End: 2023-11-15
Payer: COMMERCIAL

## 2023-11-15 DIAGNOSIS — E78.5 DYSLIPIDEMIA, GOAL LDL BELOW 70: Chronic | ICD-10-CM

## 2023-11-15 DIAGNOSIS — E55.9 VITAMIN D DEFICIENCY: Chronic | ICD-10-CM

## 2023-11-15 DIAGNOSIS — C61 PROSTATE CANCER (MULTI): ICD-10-CM

## 2023-11-15 DIAGNOSIS — D64.9 MILD ANEMIA: ICD-10-CM

## 2023-11-15 LAB
25(OH)D3 SERPL-MCNC: 50 NG/ML (ref 30–100)
ALBUMIN SERPL BCP-MCNC: 3.9 G/DL (ref 3.4–5)
ALP SERPL-CCNC: 79 U/L (ref 33–136)
ALT SERPL W P-5'-P-CCNC: 18 U/L (ref 10–52)
ANION GAP SERPL CALC-SCNC: 11 MMOL/L (ref 10–20)
AST SERPL W P-5'-P-CCNC: 20 U/L (ref 9–39)
BASOPHILS # BLD AUTO: 0.04 X10*3/UL (ref 0–0.1)
BASOPHILS NFR BLD AUTO: 0.8 %
BILIRUB SERPL-MCNC: 0.6 MG/DL (ref 0–1.2)
BUN SERPL-MCNC: 21 MG/DL (ref 6–23)
CALCIUM SERPL-MCNC: 9.7 MG/DL (ref 8.6–10.3)
CHLORIDE SERPL-SCNC: 105 MMOL/L (ref 98–107)
CHOLEST SERPL-MCNC: 149 MG/DL (ref 0–199)
CHOLESTEROL/HDL RATIO: 2.2
CO2 SERPL-SCNC: 30 MMOL/L (ref 21–32)
CREAT SERPL-MCNC: 0.87 MG/DL (ref 0.5–1.3)
EOSINOPHIL # BLD AUTO: 0.33 X10*3/UL (ref 0–0.4)
EOSINOPHIL NFR BLD AUTO: 6.9 %
ERYTHROCYTE [DISTWIDTH] IN BLOOD BY AUTOMATED COUNT: 13.2 % (ref 11.5–14.5)
GFR SERPL CREATININE-BSD FRML MDRD: 89 ML/MIN/1.73M*2
GLUCOSE SERPL-MCNC: 98 MG/DL (ref 74–99)
HCT VFR BLD AUTO: 36.9 % (ref 41–52)
HDLC SERPL-MCNC: 68.2 MG/DL
HGB BLD-MCNC: 12.2 G/DL (ref 13.5–17.5)
IMM GRANULOCYTES # BLD AUTO: 0.01 X10*3/UL (ref 0–0.5)
IMM GRANULOCYTES NFR BLD AUTO: 0.2 % (ref 0–0.9)
LDLC SERPL CALC-MCNC: 61 MG/DL
LYMPHOCYTES # BLD AUTO: 1.53 X10*3/UL (ref 0.8–3)
LYMPHOCYTES NFR BLD AUTO: 32.1 %
MCH RBC QN AUTO: 32 PG (ref 26–34)
MCHC RBC AUTO-ENTMCNC: 33.1 G/DL (ref 32–36)
MCV RBC AUTO: 97 FL (ref 80–100)
MONOCYTES # BLD AUTO: 0.48 X10*3/UL (ref 0.05–0.8)
MONOCYTES NFR BLD AUTO: 10.1 %
NEUTROPHILS # BLD AUTO: 2.37 X10*3/UL (ref 1.6–5.5)
NEUTROPHILS NFR BLD AUTO: 49.9 %
NON HDL CHOLESTEROL: 81 MG/DL (ref 0–149)
NRBC BLD-RTO: 0 /100 WBCS (ref 0–0)
PLATELET # BLD AUTO: 242 X10*3/UL (ref 150–450)
POTASSIUM SERPL-SCNC: 4.7 MMOL/L (ref 3.5–5.3)
PROT SERPL-MCNC: 6 G/DL (ref 6.4–8.2)
PSA SERPL-MCNC: <0.01 NG/ML
RBC # BLD AUTO: 3.81 X10*6/UL (ref 4.5–5.9)
SODIUM SERPL-SCNC: 141 MMOL/L (ref 136–145)
TESTOST SERPL-MCNC: <30 NG/DL (ref 240–1000)
TRIGL SERPL-MCNC: 100 MG/DL (ref 0–149)
TSH SERPL-ACNC: 2.62 MIU/L (ref 0.44–3.98)
VLDL: 20 MG/DL (ref 0–40)
WBC # BLD AUTO: 4.8 X10*3/UL (ref 4.4–11.3)

## 2023-11-15 PROCEDURE — 84403 ASSAY OF TOTAL TESTOSTERONE: CPT

## 2023-11-15 PROCEDURE — 82607 VITAMIN B-12: CPT

## 2023-11-15 PROCEDURE — 84443 ASSAY THYROID STIM HORMONE: CPT

## 2023-11-15 PROCEDURE — 80053 COMPREHEN METABOLIC PANEL: CPT

## 2023-11-15 PROCEDURE — 83540 ASSAY OF IRON: CPT

## 2023-11-15 PROCEDURE — 82746 ASSAY OF FOLIC ACID SERUM: CPT

## 2023-11-15 PROCEDURE — 82306 VITAMIN D 25 HYDROXY: CPT

## 2023-11-15 PROCEDURE — 36415 COLL VENOUS BLD VENIPUNCTURE: CPT

## 2023-11-15 PROCEDURE — 80061 LIPID PANEL: CPT

## 2023-11-15 PROCEDURE — 85025 COMPLETE CBC W/AUTO DIFF WBC: CPT

## 2023-11-15 PROCEDURE — 83550 IRON BINDING TEST: CPT

## 2023-11-15 PROCEDURE — 84153 ASSAY OF PSA TOTAL: CPT

## 2023-11-18 DIAGNOSIS — D64.9 MILD ANEMIA: Primary | ICD-10-CM

## 2023-11-18 LAB
FOLATE SERPL-MCNC: >22.3 NG/ML
IRON SATN MFR SERPL: 32 % (ref 25–45)
IRON SERPL-MCNC: 101 UG/DL (ref 35–150)
TIBC SERPL-MCNC: 317 UG/DL (ref 240–445)
UIBC SERPL-MCNC: 216 UG/DL (ref 110–370)
VIT B12 SERPL-MCNC: 504 PG/ML (ref 211–911)

## 2023-11-19 NOTE — RESULT ENCOUNTER NOTE
Horace    Thanks for doing the fasting labs.  I am glad that the cholesterol panel shows that the LDL/bad cholesterol is well under 70.  Keep up the good work.  The kidney, liver and thyroid labs look normal as do the electrolytes.  The fasting glucose is normal without signs of diabetes.    The complete blood count shows slight anemia, but this is improved from where it was last time.  The secondary labs show no signs of iron, B12 or folate deficiency.  We will continue to follow this down the road.    Finally the prostate lab remains very low which is good.    Wishing you and your family a very happy holiday season.    Sincerely,  Oral Castellano MD

## 2023-11-22 ENCOUNTER — TELEPHONE (OUTPATIENT)
Dept: RADIATION ONCOLOGY | Facility: HOSPITAL | Age: 78
End: 2023-11-22
Payer: COMMERCIAL

## 2023-11-22 NOTE — TELEPHONE ENCOUNTER
Called pt. to remind of appointment on 11/27/2023 at 9:00 am with LINDA Jones. Pt's phone went to voicemail left number if needs to reschedule.

## 2023-11-27 ENCOUNTER — HOSPITAL ENCOUNTER (OUTPATIENT)
Dept: RADIATION ONCOLOGY | Facility: HOSPITAL | Age: 78
Setting detail: RADIATION/ONCOLOGY SERIES
Discharge: HOME | End: 2023-11-27
Payer: COMMERCIAL

## 2023-11-27 ENCOUNTER — EDUCATION (OUTPATIENT)
Dept: HEMATOLOGY/ONCOLOGY | Facility: HOSPITAL | Age: 78
End: 2023-11-27

## 2023-11-27 VITALS
RESPIRATION RATE: 18 BRPM | HEIGHT: 69 IN | HEART RATE: 71 BPM | SYSTOLIC BLOOD PRESSURE: 125 MMHG | TEMPERATURE: 96.8 F | BODY MASS INDEX: 32.36 KG/M2 | WEIGHT: 218.48 LBS | OXYGEN SATURATION: 97 % | DIASTOLIC BLOOD PRESSURE: 73 MMHG

## 2023-11-27 DIAGNOSIS — C61 MALIGNANT NEOPLASM OF PROSTATE (MULTI): Primary | ICD-10-CM

## 2023-11-27 PROCEDURE — 99214 OFFICE O/P EST MOD 30 MIN: CPT

## 2023-11-27 PROCEDURE — 99214 OFFICE O/P EST MOD 30 MIN: CPT | Mod: PO

## 2023-11-27 ASSESSMENT — ENCOUNTER SYMPTOMS
ANAL BLEEDING: 0
ALLERGIC/IMMUNOLOGIC NEGATIVE: 1
JOINT SWELLING: 0
ARTHRALGIAS: 0
ABDOMINAL PAIN: 0
DIZZINESS: 0
BLOOD IN STOOL: 0
HEMATURIA: 0
UNEXPECTED WEIGHT CHANGE: 0
FEVER: 0
PSYCHIATRIC NEGATIVE: 1
DYSURIA: 0
CHEST TIGHTNESS: 0
OCCASIONAL FEELINGS OF UNSTEADINESS: 0
PALPITATIONS: 0
BACK PAIN: 0
DEPRESSION: 0
FREQUENCY: 0
LOSS OF SENSATION IN FEET: 0
DIFFICULTY URINATING: 0
DIARRHEA: 0
RECTAL PAIN: 0
SHORTNESS OF BREATH: 0
FATIGUE: 0
COUGH: 0
CONSTIPATION: 0
WEAKNESS: 0

## 2023-11-27 ASSESSMENT — PAIN SCALES - GENERAL: PAINLEVEL: 0-NO PAIN

## 2023-11-27 ASSESSMENT — COLUMBIA-SUICIDE SEVERITY RATING SCALE - C-SSRS
6. HAVE YOU EVER DONE ANYTHING, STARTED TO DO ANYTHING, OR PREPARED TO DO ANYTHING TO END YOUR LIFE?: NO
2. HAVE YOU ACTUALLY HAD ANY THOUGHTS OF KILLING YOURSELF?: NO
1. IN THE PAST MONTH, HAVE YOU WISHED YOU WERE DEAD OR WISHED YOU COULD GO TO SLEEP AND NOT WAKE UP?: NO

## 2023-11-27 ASSESSMENT — PATIENT HEALTH QUESTIONNAIRE - PHQ9
1. LITTLE INTEREST OR PLEASURE IN DOING THINGS: NOT AT ALL
SUM OF ALL RESPONSES TO PHQ9 QUESTIONS 1 AND 2: 0
2. FEELING DOWN, DEPRESSED OR HOPELESS: NOT AT ALL

## 2023-11-27 NOTE — PROGRESS NOTES
Cancer synopsis:  Rad/onc: Dr. Malloy/ Karen CNP      78 M initially presented with elevated PSA, transrectal US-guided prostate biopsy showed GS 4+3 disease in 3 samples, 9/12 cores positive. Enrolled in [facility]  010 assigned  to daro and adt 6m w/ SBRT.     PSA:  2/15/23 5.8  1/16/23 6.8  7/8/22 4.5     He had a prostate MRI and PSMA PET/CT showing no evidence of metastatic disease.     Decipher score 0.63     05/25/2023: Started ADT and daro 6m course     SBRT to prostate and SV 06/23/2023     History of presenting illness:    Patient ID: [id number]     Ebenezer Campbell is a 78 y.o. male who presents for his intermediate risk prostate cancer GS 4+3=7, now s/p SBRT to prostate and enrolled in [facility] .    RT Site: Prostate and SV  RT Date: 06/23/2023  Hormone therapy: Yes, completed 11/2023 with 6m of ADT and daro  Hot Flushes: Denies, have resolved per patient.  Fatigue: Denies  Bone pain: Denies  EULALIA: 2  ED: Admits to loss of sexual function since starting systemic therapy  ED medications: No  IPSS: 6  Urinary symptoms: Denies dysuria, hematuria. Denies weak stream or incomplete bladder emptying. Does report that with coffee intake increased urgency. Denies urgency outside of this. Admits to occasional nocturia.  Urinary Medications: Yes, flowmax daily  Rectal bleeding: Denies  Other systems: Denies SOB, Cp or fever.      Review of systems:  Review of Systems   Constitutional:  Negative for fatigue, fever and unexpected weight change.   Respiratory:  Negative for cough, chest tightness and shortness of breath.    Cardiovascular:  Negative for chest pain, palpitations and leg swelling.   Gastrointestinal:  Negative for abdominal pain, anal bleeding, blood in stool, constipation, diarrhea and rectal pain.   Endocrine: Negative for cold intolerance, heat intolerance and polyuria.   Genitourinary:  Negative for decreased urine volume, difficulty urinating, dysuria, frequency, hematuria and urgency.    Musculoskeletal:  Negative for arthralgias, back pain, gait problem and joint swelling.   Skin: Negative.    Allergic/Immunologic: Negative.    Neurological:  Negative for dizziness, syncope and weakness.   Psychiatric/Behavioral: Negative.         Past Medical history  Past Medical History:   Diagnosis Date    Contact with and (suspected) exposure to other viral communicable diseases 12/30/2014    Exposure to influenza    Dysphagia, unspecified 04/16/2013    Difficulty swallowing    Impacted cerumen, right ear 02/16/2017    Impacted cerumen of right ear    Obstructive sleep apnea (adult) (pediatric) 10/28/2015    Obstructive sleep apnea on CPAP    Other conditions influencing health status     Nephrolithiasis    Other enthesopathies, not elsewhere classified     Tendonitis of shoulder    Other fatigue     Tired    Other fracture of first lumbar vertebra, initial encounter for closed fracture (CMS/[facility])     Closed fracture of L1 vertebra    Personal history of other diseases of the circulatory system 10/01/2015    History of hypertension    Personal history of other diseases of the musculoskeletal system and connective tissue 12/16/2014    History of low back pain    Personal history of other diseases of the nervous system and sense organs 02/06/2015    History of sleep apnea    Personal history of other diseases of the respiratory system     History of allergic rhinitis    Personal history of other diseases of urinary system 10/28/2015    History of hematuria    Personal history of other specified conditions     History of edema    Residual hemorrhoidal skin tags 04/23/2014    External hemorrhoids    Snoring 05/16/2014    Snoring    Sprain of joints and ligaments of unspecified parts of neck, initial encounter 12/19/2014    Neck sprain    Unspecified visual loss     Vision problem    Vitamin D deficiency, unspecified     Vitamin D deficiency        Surgical/family history  Family History   Problem Relation Name  "Age of Onset    Aortic aneurysm Father      Diabetes Brother      Other (heart valve replaced) Brother      Prostate cancer Brother      Coronary artery disease Other Family History       Past Surgical History:   Procedure Laterality Date    COLONOSCOPY  11/28/2012    Complete Colonoscopy    ESOPHAGOGASTRODUODENOSCOPY  10/28/2016    Esophagogastroduodenoscopy With Biopsy    KNEE ARTHROSCOPY W/ DEBRIDEMENT  04/23/2014    Arthroscopy Knee Left    OTHER SURGICAL HISTORY  01/22/2020    Cataract surgery    OTHER SURGICAL HISTORY  02/04/2022    Esophagogastroduodenoscopy    OTHER SURGICAL HISTORY  11/19/2021    Cardiac catheterization with stent placement    UMBILICAL HERNIA REPAIR  11/28/2012    Umbilical Hernia Repair        Social History  Tobacco Use: Low Risk  (11/27/2023)    Patient History     Smoking Tobacco Use: Never     Smokeless Tobacco Use: Never     Passive Exposure: Not on file         Current med list:  Current Outpatient Medications   Medication Instructions    aspirin 81 mg EC tablet 1 tablet, oral, Daily    atorvastatin (Lipitor) 80 mg tablet TAKE 1 TABLET BY MOUTH EVERY DAY FOR HIGH CHOLESTEROL    cetirizine (ZYRTEC) 10 mg, oral, Daily PRN    cholecalciferol (Vitamin D-3) 50 mcg (2,000 unit) capsule 1 capsule, oral, Daily    fluticasone (Flonase) 50 mcg/actuation nasal spray 1 spray, nasal, 2 times daily    losartan (COZAAR) 50 mg, oral, Daily    MULTIVITAMIN ORAL 1 capsule, oral, Daily    omeprazole (PRILOSEC) 40 mg, oral, Daily before breakfast    tamsulosin (FLOMAX) 0.4 mg        Last recorded vital:  /73 (BP Location: Right arm, Patient Position: Sitting, BP Cuff Size: Large adult)   Pulse 71   Temp 36 °C (96.8 °F) (Temporal)   Resp 18   Ht 1.756 m (5' 9.13\")   Wt 99.1 kg (218 lb 7.6 oz)   SpO2 97%   BMI 32.14 kg/m²     Physical exam  Physical Exam  Constitutional:       Appearance: Normal appearance.   Cardiovascular:      Rate and Rhythm: Normal rate.   Pulmonary:      Effort: " Pulmonary effort is normal.      Breath sounds: Normal breath sounds.   Musculoskeletal:         General: Normal range of motion.      Cervical back: Normal range of motion.   Neurological:      Mental Status: He is alert and oriented to person, place, and time.   Psychiatric:         Mood and Affect: Mood normal.         Behavior: Behavior normal.         Thought Content: Thought content normal.         Judgment: Judgment normal.       Pertinent labs:  Prostate Specific AG   Date/Time Value Ref Range Status   11/15/2023 08:06 AM <0.01 <=4.00 ng/mL Final         Dx:  Problem List Items Addressed This Visit    None  Visit Diagnoses       Malignant neoplasm of prostate (CMS/HCC)    -  Primary           PSA of <0.01 was reviewed and is undectable. Testerone is currently <30 as expected while Testerone recovers. Reviewed typically will recover in 6-9 months s/p hormonal therapy. At which time hormonal therapy SE will typically resolve. Current low hgb and hematocrit related likely to recovering testerone and recent RT will continue to follow in 3m, b12, iron and folate normal. Review of latent SE including rectal bleeding, hematuria, urinary strictures, ED where reviewed as well as how to contact office if s/s present. Denies latent SE. Does report some ongoing ED however had issues prior. Likely current ED loss is caused by low Testerone. NCCN guidelines where reviewed and routine FUV of every 3m for first year and every 6m for four years for a total of five years was discussed. Patient verbalized understanding.          PLAN:  FUV 3m per NRG  010  Labs CBC, CMP, PSA and testerone  Imaging none  FUV other providers: PCP for routine evals        Please contact office with any concerns:  aYn Salcedo CNP  [phone]

## 2023-11-27 NOTE — RESEARCH NOTES
Research Note Follow Up Visit       Ebenezer Campbell is here today for 6 mo. follow up on NRG-.  Follow up procedures completed per protocol. Patient is aware of continued follow up plan.      Education Documentation  Tumor Markers, taught by Flor Black RN at 11/27/2023 11:22 AM.  Learner: Patient  Readiness: Acceptance  Method: Explanation  Response: Verbalizes Understanding    Comprehensive Metabolic Panel (CMP), taught by Flor Black RN at 11/27/2023 11:22 AM.  Learner: Patient  Readiness: Acceptance  Method: Explanation  Response: Verbalizes Understanding    Complete Blood Count with Differential (CBC w/ Diff), taught by Flor Black RN at 11/27/2023 11:22 AM.  Learner: Patient  Readiness: Acceptance  Method: Explanation  Response: Verbalizes Understanding    Education Comments  No comments found.

## 2024-01-17 ENCOUNTER — OFFICE VISIT (OUTPATIENT)
Dept: GASTROENTEROLOGY | Facility: CLINIC | Age: 79
End: 2024-01-17
Payer: COMMERCIAL

## 2024-01-17 VITALS
TEMPERATURE: 97.3 F | OXYGEN SATURATION: 96 % | HEART RATE: 73 BPM | RESPIRATION RATE: 16 BRPM | WEIGHT: 215 LBS | HEIGHT: 69 IN | SYSTOLIC BLOOD PRESSURE: 130 MMHG | BODY MASS INDEX: 31.84 KG/M2 | DIASTOLIC BLOOD PRESSURE: 67 MMHG

## 2024-01-17 DIAGNOSIS — K59.09 CHRONIC CONSTIPATION: ICD-10-CM

## 2024-01-17 DIAGNOSIS — R13.10 DYSPHAGIA, UNSPECIFIED TYPE: Chronic | ICD-10-CM

## 2024-01-17 DIAGNOSIS — R13.12 OROPHARYNGEAL DYSPHAGIA: Primary | ICD-10-CM

## 2024-01-17 PROCEDURE — 1036F TOBACCO NON-USER: CPT | Performed by: REGISTERED NURSE

## 2024-01-17 PROCEDURE — 99214 OFFICE O/P EST MOD 30 MIN: CPT | Performed by: REGISTERED NURSE

## 2024-01-17 PROCEDURE — 3078F DIAST BP <80 MM HG: CPT | Performed by: REGISTERED NURSE

## 2024-01-17 PROCEDURE — 3075F SYST BP GE 130 - 139MM HG: CPT | Performed by: REGISTERED NURSE

## 2024-01-17 PROCEDURE — 1160F RVW MEDS BY RX/DR IN RCRD: CPT | Performed by: REGISTERED NURSE

## 2024-01-17 PROCEDURE — 1159F MED LIST DOCD IN RCRD: CPT | Performed by: REGISTERED NURSE

## 2024-01-17 PROCEDURE — 1126F AMNT PAIN NOTED NONE PRSNT: CPT | Performed by: REGISTERED NURSE

## 2024-01-17 ASSESSMENT — ENCOUNTER SYMPTOMS
DIARRHEA: 0
RECTAL PAIN: 0
ABDOMINAL PAIN: 0
MYALGIAS: 0
SHORTNESS OF BREATH: 0
VOMITING: 0
TROUBLE SWALLOWING: 1
NAUSEA: 0
EYE PAIN: 0
EYES NEGATIVE: 1
APPETITE CHANGE: 0
DIFFICULTY URINATING: 0
COUGH: 0
JOINT SWELLING: 0
BLOOD IN STOOL: 0
CONSTIPATION: 1
ABDOMINAL DISTENTION: 0
NERVOUS/ANXIOUS: 0
ARTHRALGIAS: 0
ENDOCRINE NEGATIVE: 1
UNEXPECTED WEIGHT CHANGE: 0
ANAL BLEEDING: 0

## 2024-01-17 NOTE — PROGRESS NOTES
REASON FOR VISIT:  Dysphagia to liquids and chronic constipation. Had a normal EGD in 2021.     HPI:  Ebenezer Campbell is a 78 y.o. male coronary artery disease, hypertension, dyslipidemia, GERD.  Diagnosed with Prostate CA in spring 2023 - received radiation and was enrolled in a clinical trial. Doing well.     Referred to GI for chronic dysphagia.  Feels well on Omeprazole 40mg daily. Reports sensation of choking with liquids only, usually once a week in the fall 2023. States that this happens when he is hurrying to drink or taking a breath while he is drinking.  Denies any episodes of dysphagia to solid food, esophageal dysphagia or food impaction.  Denies epigastric pain, reflux, early satiety, nausea or vomiting.  Weight and appetite are stable.  Moves his bowels usually 3 times a week (baseline) and states he has been slightly more constipated.  Tends to be low fiber diet and does not drink a lot of water.  Denies hematochezia, melena, urgency, diarrhea or nocturnal bowel movements.    Choking with liquids only - once a week , now drinking more slowly which helps  Takes omeprazole 40mg in the morning - no breakthrough symptoms  Denies N/V   Weight stable    Denies NSAID use       Med list notable for omeprazole 40mg once daily     Labs   Lab Results   Component Value Date    WBC 4.8 11/15/2023    HGB 12.2 (L) 11/15/2023    HCT 36.9 (L) 11/15/2023    MCV 97 11/15/2023     11/15/2023     Lab Results   Component Value Date     11/15/2023    K 4.7 11/15/2023     11/15/2023    CO2 30 11/15/2023    BUN 21 11/15/2023    CREATININE 0.87 11/15/2023    GLUCOSE 98 11/15/2023    CALCIUM 9.7 11/15/2023      Lab Results   Component Value Date    ALKPHOS 79 11/15/2023    ALKPHOS 75 08/23/2023    ALKPHOS 103 06/23/2023    BILITOT 0.6 11/15/2023    BILITOT 0.8 08/23/2023    BILITOT 0.8 06/23/2023    PROT 6.0 (L) 11/15/2023    PROT 6.3 (L) 08/23/2023    PROT 7.0 06/23/2023    ALT 18 11/15/2023    ALT 21  "08/23/2023    ALT 45 06/23/2023    AST 20 11/15/2023    AST 24 08/23/2023    AST 35 06/23/2023      No results found for: \"INR\"   Lab Results   Component Value Date    IRON 101 11/15/2023    TIBC 317 11/15/2023      No results found for: \"FERRITIN\"   Lab Results   Component Value Date    TSH 2.62 11/15/2023    FREET4 1.07 02/12/2020        Fam Hx-  denies fam history of colon CA, Prostate CA in his family       Previous Endoscopic evaluation:     EGD -  12/16/21 - Dr Wu     Impression:            - Normal esophagus. Biopsied.                         - Z-line regular, 40 cm from the incisors.                         - Normal stomach.                         - Normal examined duodenum    >> Biopsy Results: Esophagus-squamous epithelium with no significant histopathologic abnormality.  Negative for eosinophilic esophagitis    2.  Colonoscopy - 12/19/19- Dr Wu     Impression:            - The examined portion of the ileum was normal.                         - Diverticulosis in the sigmoid colon and in the                          descending colon.                         - External hemorrhoids.                         - One 5 mm polyp in the transverse colon, removed with                          a cold snare. Resected and retrieved.                         - One 2 mm polyp in the transverse colon, removed with                          a cold biopsy forceps. Resected and retrieved.  Recommendation:        - Patient has a contact number available for                          emergencies. The signs and symptoms of potential                          delayed complications were discussed with the patient.                          Return to normal activities tomorrow. Written                          discharge instructions were provided to the patient.                         - Resume previous diet.                         - Continue present medications.                         - Await pathology results.               "           - Repeat colonoscopy in 5 years for surveillance -  December 2024                          - Return to primary care physician.    >> Biopsy Results -2  - tubular adenomas removed from the transverse colon    REVIEW OF SYSTEMS    Review of Systems   Constitutional:  Negative for appetite change and unexpected weight change.   HENT:  Positive for trouble swallowing. Negative for mouth sores.    Eyes: Negative.  Negative for pain and visual disturbance.   Respiratory:  Negative for cough and shortness of breath.    Cardiovascular:  Negative for chest pain.   Gastrointestinal:  Positive for constipation. Negative for abdominal distention, abdominal pain, anal bleeding, blood in stool, diarrhea, nausea, rectal pain and vomiting.   Endocrine: Negative.    Genitourinary:  Negative for difficulty urinating.   Musculoskeletal:  Negative for arthralgias, joint swelling and myalgias.   Skin:  Negative for rash.   Allergic/Immunologic: Negative for environmental allergies and food allergies.   Psychiatric/Behavioral:  The patient is not nervous/anxious.           No Known Allergies    Past Medical History:   Diagnosis Date    Contact with and (suspected) exposure to other viral communicable diseases 12/30/2014    Exposure to influenza    Dysphagia, unspecified 04/16/2013    Difficulty swallowing    Impacted cerumen, right ear 02/16/2017    Impacted cerumen of right ear    Obstructive sleep apnea (adult) (pediatric) 10/28/2015    Obstructive sleep apnea on CPAP    Other conditions influencing health status     Nephrolithiasis    Other enthesopathies, not elsewhere classified     Tendonitis of shoulder    Other fatigue     Tired    Other fracture of first lumbar vertebra, initial encounter for closed fracture (CMS/[facility])     Closed fracture of L1 vertebra    Personal history of other diseases of the circulatory system 10/01/2015    History of hypertension    Personal history of other diseases of the musculoskeletal system  and connective tissue 12/16/2014    History of low back pain    Personal history of other diseases of the nervous system and sense organs 02/06/2015    History of sleep apnea    Personal history of other diseases of the respiratory system     History of allergic rhinitis    Personal history of other diseases of urinary system 10/28/2015    History of hematuria    Personal history of other specified conditions     History of edema    Residual hemorrhoidal skin tags 04/23/2014    External hemorrhoids    Snoring 05/16/2014    Snoring    Sprain of joints and ligaments of unspecified parts of neck, initial encounter 12/19/2014    Neck sprain    Unspecified visual loss     Vision problem    Vitamin D deficiency, unspecified     Vitamin D deficiency       Past Surgical History:   Procedure Laterality Date    COLONOSCOPY  11/28/2012    Complete Colonoscopy    ESOPHAGOGASTRODUODENOSCOPY  10/28/2016    Esophagogastroduodenoscopy With Biopsy    KNEE ARTHROSCOPY W/ DEBRIDEMENT  04/23/2014    Arthroscopy Knee Left    OTHER SURGICAL HISTORY  01/22/2020    Cataract surgery    OTHER SURGICAL HISTORY  02/04/2022    Esophagogastroduodenoscopy    OTHER SURGICAL HISTORY  11/19/2021    Cardiac catheterization with stent placement    UMBILICAL HERNIA REPAIR  11/28/2012    Umbilical Hernia Repair       Family History   Problem Relation Name Age of Onset    Aortic aneurysm Father      Diabetes Brother      Other (heart valve replaced) Brother      Prostate cancer Brother      Coronary artery disease Other Family History        Social History     Tobacco Use    Smoking status: Never    Smokeless tobacco: Never   Substance Use Topics    Alcohol use: Yes     Comment: 1-2 drinks a week       Current Outpatient Medications   Medication Sig Dispense Refill    aspirin 81 mg EC tablet Take 1 tablet (81 mg) by mouth once daily.      atorvastatin (Lipitor) 80 mg tablet TAKE 1 TABLET BY MOUTH EVERY DAY FOR HIGH CHOLESTEROL 90 tablet 3    cetirizine  (ZyrTEC) 5 mg tablet Take 2 tablets (10 mg) by mouth once daily as needed for allergies.      cholecalciferol (Vitamin D-3) 50 mcg (2,000 unit) capsule Take 1 capsule (50 mcg) by mouth once daily.      fluticasone (Flonase) 50 mcg/actuation nasal spray Administer 1 spray into affected nostril(s) 2 times a day.      losartan (Cozaar) 50 mg tablet Take 1 tablet (50 mg) by mouth once daily. 90 tablet 3    MULTIVITAMIN ORAL Take 1 capsule by mouth once daily.      omeprazole (PriLOSEC) 40 mg DR capsule Take 1 capsule (40 mg) by mouth once daily in the morning. Take before meals. 90 capsule 3    tamsulosin (Flomax) 0.4 mg 24 hr capsule 1 capsule (0.4 mg).       No current facility-administered medications for this visit.       PHYSICAL EXAM:  There were no vitals taken for this visit.     Physical Exam  Constitutional:       Appearance: Normal appearance.   HENT:      Head: Normocephalic and atraumatic.      Nose: Nose normal.   Eyes:      Pupils: Pupils are equal, round, and reactive to light.   Cardiovascular:      Rate and Rhythm: Normal rate and regular rhythm.   Pulmonary:      Effort: Pulmonary effort is normal.      Breath sounds: Normal breath sounds.   Abdominal:      General: Abdomen is flat. Bowel sounds are normal.      Palpations: Abdomen is soft.   Genitourinary:     Rectum: Normal.   Musculoskeletal:         General: Normal range of motion.      Cervical back: Normal range of motion and neck supple.   Skin:     General: Skin is warm and dry.   Neurological:      Mental Status: He is alert and oriented to person, place, and time.   Psychiatric:         Mood and Affect: Mood normal.         Behavior: Behavior normal.        ASSESSMENT    Oropharyngeal dysphagia to liquids once a week or less since the fall of 2023 - improved with drinking slowly and concentrating on swallowing - consider Modified Barium Swallow Study with Speech Therapist if symptoms progress. No history of neurologic disorders.    Constipation - increase dietary fiber and water intake   History of Tubular adenoma of the colon - due for surveillance colonoscopy in December 2024     PLAN    Eat slowly and chew thoroughly at mealtime.  Call the office of you have worsening swallowing problems and I will order a modified barium swallow test.  I do not think that you need one at this time  Fiber can help to regulate your bowels. It is good for diarrhea or constipation. Begin Fiber supplement, such as, Metamucil daily. Mix one teaspoon in 8 oz of water and drink each day for 7 days.  Week two -  mix two teaspoons in 8 oz of water and drink daily for 7 days. Week 3 - mix three teaspoons ( one tablespoon) in 8 oz of water and stay at that dose.  If you are bloated then decrease the dose.  Use fiber every day for 4 weeks.  It can help to regulate your bowels.  If it helps then keep using it every day.    Alternatively, you can try Benefiber 2 to 3 teaspoons twice a day mixed in soft food or liquid  Increase dietary fiber to 25 to 30 g daily spread throughout the day.  Increase your water intake to 40 to 60 ounces water daily  I would recommend that you meet with a dietitian at your gym   Follow-up in the GI clinic in November 2024.  You will be due for a surveillance colonoscopy in December 2024      SHILO Ortega      Date: 1/17/2024  Time: 8:30 AM

## 2024-01-30 ENCOUNTER — OFFICE VISIT (OUTPATIENT)
Dept: CARDIOLOGY | Facility: CLINIC | Age: 79
End: 2024-01-30
Payer: MEDICARE

## 2024-01-30 VITALS
SYSTOLIC BLOOD PRESSURE: 102 MMHG | WEIGHT: 212.38 LBS | HEART RATE: 90 BPM | HEIGHT: 69 IN | OXYGEN SATURATION: 96 % | DIASTOLIC BLOOD PRESSURE: 66 MMHG | BODY MASS INDEX: 31.45 KG/M2

## 2024-01-30 DIAGNOSIS — I71.21 ASCENDING AORTIC ANEURYSM, UNSPECIFIED WHETHER RUPTURED (CMS-HCC): Primary | ICD-10-CM

## 2024-01-30 DIAGNOSIS — I25.118 CORONARY ARTERY DISEASE OF NATIVE ARTERY OF NATIVE HEART WITH STABLE ANGINA PECTORIS (CMS-HCC): ICD-10-CM

## 2024-01-30 PROCEDURE — 93010 ELECTROCARDIOGRAM REPORT: CPT | Performed by: INTERNAL MEDICINE

## 2024-01-30 PROCEDURE — 1126F AMNT PAIN NOTED NONE PRSNT: CPT | Performed by: INTERNAL MEDICINE

## 2024-01-30 PROCEDURE — 1036F TOBACCO NON-USER: CPT | Performed by: INTERNAL MEDICINE

## 2024-01-30 PROCEDURE — 1159F MED LIST DOCD IN RCRD: CPT | Performed by: INTERNAL MEDICINE

## 2024-01-30 PROCEDURE — 3078F DIAST BP <80 MM HG: CPT | Performed by: INTERNAL MEDICINE

## 2024-01-30 PROCEDURE — 1157F ADVNC CARE PLAN IN RCRD: CPT | Performed by: INTERNAL MEDICINE

## 2024-01-30 PROCEDURE — 3074F SYST BP LT 130 MM HG: CPT | Performed by: INTERNAL MEDICINE

## 2024-01-30 PROCEDURE — 93005 ELECTROCARDIOGRAM TRACING: CPT | Performed by: INTERNAL MEDICINE

## 2024-01-30 PROCEDURE — 99214 OFFICE O/P EST MOD 30 MIN: CPT | Mod: 25 | Performed by: INTERNAL MEDICINE

## 2024-01-30 PROCEDURE — 99214 OFFICE O/P EST MOD 30 MIN: CPT | Performed by: INTERNAL MEDICINE

## 2024-01-30 ASSESSMENT — ENCOUNTER SYMPTOMS
DEPRESSION: 0
OCCASIONAL FEELINGS OF UNSTEADINESS: 0
LOSS OF SENSATION IN FEET: 0

## 2024-01-30 ASSESSMENT — COLUMBIA-SUICIDE SEVERITY RATING SCALE - C-SSRS
2. HAVE YOU ACTUALLY HAD ANY THOUGHTS OF KILLING YOURSELF?: NO
6. HAVE YOU EVER DONE ANYTHING, STARTED TO DO ANYTHING, OR PREPARED TO DO ANYTHING TO END YOUR LIFE?: NO
1. IN THE PAST MONTH, HAVE YOU WISHED YOU WERE DEAD OR WISHED YOU COULD GO TO SLEEP AND NOT WAKE UP?: NO

## 2024-01-30 ASSESSMENT — PAIN SCALES - GENERAL: PAINLEVEL: 0-NO PAIN

## 2024-01-30 NOTE — PROGRESS NOTES
Primary Care Physician: Oral Castellano MD  Date of Visit: 01/30/2024  9:40 AM EST  Location of visit: [facility] [place] [address]     Chief Complaint:   Chief Complaint   Patient presents with    Follow-up        HPI / Summary:   Ebenezer Campbell is a 78 y.o. male presents for followup.   Follow-up for prostate cancer by oncology.  Had atypical chest pain symptoms and underwent left heart cath in November 2020 proximal to mid LAD 80%, mid LAD 70%, circumflex 30% mid, RCA 40% distal OCT guided overlapping stents to the LAD 2.5X 38 mm and 2.5X 18 mm  Normal carotid duplex November 2020      Specialty Problems          Cardiology Problems    H/O: HTN (hypertension)     Last Assessment & Plan: Formatting of this note might be different from the original. Assessment: on No medications - BP better control after weight loss, no meds needed any more.         Agatston coronary artery calcium score greater than 400    Coronary artery disease of native heart with stable angina pectoris (CMS/[facility])    Dyslipidemia, goal LDL below 70    Essential hypertension with goal blood pressure less than 130/85    Peripheral vascular disease (CMS/HCC)    Presence of drug coated stent in LAD coronary artery        Past Medical History:   Diagnosis Date    Contact with and (suspected) exposure to other viral communicable diseases 12/30/2014    Exposure to influenza    Dysphagia, unspecified 04/16/2013    Difficulty swallowing    Impacted cerumen, right ear 02/16/2017    Impacted cerumen of right ear    Obstructive sleep apnea (adult) (pediatric) 10/28/2015    Obstructive sleep apnea on CPAP    Other conditions influencing health status     Nephrolithiasis    Other enthesopathies, not elsewhere classified     Tendonitis of shoulder    Other fatigue     Tired    Other fracture of first lumbar vertebra, initial encounter for closed fracture (CMS/HCC)     Closed fracture of L1 vertebra    Personal history of other diseases of the circulatory system 10/01/2015     History of hypertension    Personal history of other diseases of the musculoskeletal system and connective tissue 12/16/2014    History of low back pain    Personal history of other diseases of the nervous system and sense organs 02/06/2015    History of sleep apnea    Personal history of other diseases of the respiratory system     History of allergic rhinitis    Personal history of other diseases of urinary system 10/28/2015    History of hematuria    Personal history of other specified conditions     History of edema    Residual hemorrhoidal skin tags 04/23/2014    External hemorrhoids    Snoring 05/16/2014    Snoring    Sprain of joints and ligaments of unspecified parts of neck, initial encounter 12/19/2014    Neck sprain    Unspecified visual loss     Vision problem    Vitamin D deficiency, unspecified     Vitamin D deficiency          Past Surgical History:   Procedure Laterality Date    COLONOSCOPY  11/28/2012    Complete Colonoscopy    ESOPHAGOGASTRODUODENOSCOPY  10/28/2016    Esophagogastroduodenoscopy With Biopsy    KNEE ARTHROSCOPY W/ DEBRIDEMENT  04/23/2014    Arthroscopy Knee Left    OTHER SURGICAL HISTORY  01/22/2020    Cataract surgery    OTHER SURGICAL HISTORY  02/04/2022    Esophagogastroduodenoscopy    OTHER SURGICAL HISTORY  11/19/2021    Cardiac catheterization with stent placement    UMBILICAL HERNIA REPAIR  11/28/2012    Umbilical Hernia Repair          Social History:   reports that he has never smoked. He has never used smokeless tobacco. He reports current alcohol use. He reports that he does not use drugs.      Allergies:  No Known Allergies    Outpatient Medications:  Current Outpatient Medications   Medication Instructions    aspirin 81 mg EC tablet 1 tablet, oral, Daily    atorvastatin (Lipitor) 80 mg tablet TAKE 1 TABLET BY MOUTH EVERY DAY FOR HIGH CHOLESTEROL    cetirizine (ZYRTEC) 10 mg, oral, Daily PRN    cholecalciferol (Vitamin D-3) 50 mcg (2,000 unit) capsule 1 capsule, oral,  "Daily    fluticasone (Flonase) 50 mcg/actuation nasal spray 1 spray, nasal, 2 times daily    losartan (COZAAR) 50 mg, oral, Daily    MULTIVITAMIN ORAL 1 capsule, oral, Daily    omeprazole (PRILOSEC) 40 mg, oral, Daily before breakfast    tamsulosin (FLOMAX) 0.4 mg       ROS     Physical Exam:  Vitals:    01/30/24 1000   BP: 102/66   BP Location: Left arm   Patient Position: Sitting   Pulse: 90   SpO2: 96%   Weight: 96.3 kg (212 lb 6 oz)   Height: 1.74 m (5' 8.5\")     Wt Readings from Last 5 Encounters:   01/30/24 96.3 kg (212 lb 6 oz)   01/17/24 97.5 kg (215 lb)   11/27/23 99.1 kg (218 lb 7.6 oz)   10/10/23 97.7 kg (215 lb 6.4 oz)   08/24/23 94.5 kg (208 lb 5.4 oz)     Body mass index is 31.82 kg/m².   Well-developed male in no acute distress.  Flat JVP.  Normal carotid upstrokes.  Regular rhythm without gallop or murmur.  Clear lungs without crackles.  Soft abdomen without masses.  No dependent edema with intact pedal pulses     Last Labs:  CMP:  Recent Labs     11/15/23  0806 08/23/23  0816 06/23/23  1156 05/25/23  1402 04/25/23  0745    140 142 143 139   K 4.7 3.6 3.9 3.5 3.7    104 106 105 102   CO2 30 28 28 28 28   ANIONGAP 11 12 12 14 13   BUN 21 27* 20 15 22   CREATININE 0.87 1.01 0.77 0.82 0.99   EGFR 89  --   --   --   --    GLUCOSE 98 92 105* 101* 98     Recent Labs     11/15/23  0806 08/23/23  0816 06/23/23  1156 05/25/23  1402 04/25/23  0745   ALBUMIN 3.9 3.8 4.2 4.0 4.2   ALKPHOS 79 75 103 90 88   ALT 18 21 45 18 23   AST 20 24 35 17 22   BILITOT 0.6 0.8 0.8 0.5 0.7     CBC:  Recent Labs     11/15/23  0806 08/23/23  0816 06/23/23  1156 05/25/23  1402 04/25/23  0745   WBC 4.8 4.4 7.7 8.0 6.2   HGB 12.2* 11.8* 14.0 14.9 13.9   HCT 36.9* 35.8* 41.8 44.3 43.4    224 273 263 301   MCV 97 95 92 92 95     COAG: No results for input(s): \"INR\", \"DDIMERVTE\" in the last 25063 hours.  HEME/ENDO:  Recent Labs     11/15/23  0806 04/14/23  0937 06/24/22  0810 04/08/22  0852 05/13/21  0818 " "11/12/20  1415   IRONSAT 32  --   --   --   --   --    TSH 2.62 1.54 2.93 2.94   < >  --    HGBA1C  --   --   --   --   --  5.4    < > = values in this interval not displayed.      CARDIAC: No results for input(s): \"LDH\", \"CKMB\", \"TROPHS\", \"BNP\" in the last 71328 hours.    No lab exists for component: \"CK\", \"CKMBP\"  Recent Labs     11/15/23  0806 04/14/23  0937 06/24/22  0810 04/08/22  0852   CHOL 149 159 133 142   LDLF  --  75 58 63   HDL 68.2 54.3 57.0 62.0   TRIG 100 151* 91 84       Last Cardiology Tests:  ECG:  Normal ECG no pathological Q waves no acute ST or T wave changes    Echo:  Echo Results:  No results found for this or any previous visit from the past 3650 days.       Cath:      Stress Test:  Stress Results:  No results found for this or any previous visit from the past 365 days.         Cardiac Imaging:  OUTSIDE IMAGING SCAN  Ordered by an unspecified provider.        Assessment/Plan   Status post LAD stent in the setting of atypical angina 2020, doing well clinically LDL is reasonably well-controlled although has drifted upward a bit.  Discussed lifestyle interventions.  No change in drug treatment.  Will assess ascending aortic enlargement, calcium score in 2020 his ascending aorta was 3.9 cm.  I will call him after result and see him back in 6 months no change in drug treatment.        Orders:  No orders of the defined types were placed in this encounter.     Followup Appts:  Future Appointments   Date Time Provider Department Center   2/26/2024  9:00 AM MARLENE Soto-CNP [id number]   4/9/2024  2:30 PM Oral Castellano MD [id number]           ____________________________________________________________  Pedro Lewis MD    Senior Attending Physician  [facility] [place]  [facility]   "

## 2024-02-05 LAB
ATRIAL RATE: 85 BPM
P AXIS: 28 DEGREES
P OFFSET: 204 MS
P ONSET: 149 MS
PR INTERVAL: 152 MS
Q ONSET: 225 MS
QRS COUNT: 14 BEATS
QRS DURATION: 88 MS
QT INTERVAL: 386 MS
QTC CALCULATION(BAZETT): 459 MS
QTC FREDERICIA: 433 MS
R AXIS: -3 DEGREES
T AXIS: 53 DEGREES
T OFFSET: 418 MS
VENTRICULAR RATE: 85 BPM

## 2024-02-20 ENCOUNTER — LAB (OUTPATIENT)
Dept: LAB | Facility: LAB | Age: 79
End: 2024-02-20
Payer: MEDICARE

## 2024-02-20 DIAGNOSIS — C61 MALIGNANT NEOPLASM OF PROSTATE (MULTI): ICD-10-CM

## 2024-02-20 DIAGNOSIS — C61 MALIGNANT NEOPLASM OF PROSTATE (MULTI): Primary | ICD-10-CM

## 2024-02-20 LAB
ALBUMIN SERPL BCP-MCNC: 4.3 G/DL (ref 3.4–5)
ALP SERPL-CCNC: 95 U/L (ref 33–136)
ALT SERPL W P-5'-P-CCNC: 22 U/L (ref 10–52)
ANION GAP SERPL CALC-SCNC: 13 MMOL/L (ref 10–20)
AST SERPL W P-5'-P-CCNC: 23 U/L (ref 9–39)
BASOPHILS # BLD AUTO: 0.05 X10*3/UL (ref 0–0.1)
BASOPHILS NFR BLD AUTO: 1.1 %
BILIRUB SERPL-MCNC: 0.7 MG/DL (ref 0–1.2)
BUN SERPL-MCNC: 17 MG/DL (ref 6–23)
CALCIUM SERPL-MCNC: 10.1 MG/DL (ref 8.6–10.3)
CHLORIDE SERPL-SCNC: 105 MMOL/L (ref 98–107)
CO2 SERPL-SCNC: 28 MMOL/L (ref 21–32)
CREAT SERPL-MCNC: 0.87 MG/DL (ref 0.5–1.3)
EGFRCR SERPLBLD CKD-EPI 2021: 89 ML/MIN/1.73M*2
EOSINOPHIL # BLD AUTO: 0.3 X10*3/UL (ref 0–0.4)
EOSINOPHIL NFR BLD AUTO: 6.7 %
ERYTHROCYTE [DISTWIDTH] IN BLOOD BY AUTOMATED COUNT: 13.2 % (ref 11.5–14.5)
GLUCOSE SERPL-MCNC: 92 MG/DL (ref 74–99)
HCT VFR BLD AUTO: 41.3 % (ref 41–52)
HGB BLD-MCNC: 13.8 G/DL (ref 13.5–17.5)
IMM GRANULOCYTES # BLD AUTO: 0.01 X10*3/UL (ref 0–0.5)
IMM GRANULOCYTES NFR BLD AUTO: 0.2 % (ref 0–0.9)
LYMPHOCYTES # BLD AUTO: 1.5 X10*3/UL (ref 0.8–3)
LYMPHOCYTES NFR BLD AUTO: 33.3 %
MCH RBC QN AUTO: 31.9 PG (ref 26–34)
MCHC RBC AUTO-ENTMCNC: 33.4 G/DL (ref 32–36)
MCV RBC AUTO: 96 FL (ref 80–100)
MONOCYTES # BLD AUTO: 0.43 X10*3/UL (ref 0.05–0.8)
MONOCYTES NFR BLD AUTO: 9.6 %
NEUTROPHILS # BLD AUTO: 2.21 X10*3/UL (ref 1.6–5.5)
NEUTROPHILS NFR BLD AUTO: 49.1 %
NRBC BLD-RTO: 0 /100 WBCS (ref 0–0)
PLATELET # BLD AUTO: 304 X10*3/UL (ref 150–450)
POTASSIUM SERPL-SCNC: 3.8 MMOL/L (ref 3.5–5.3)
PROT SERPL-MCNC: 6.7 G/DL (ref 6.4–8.2)
RBC # BLD AUTO: 4.32 X10*6/UL (ref 4.5–5.9)
SODIUM SERPL-SCNC: 142 MMOL/L (ref 136–145)
TESTOST SERPL-MCNC: 211 NG/DL (ref 240–1000)
WBC # BLD AUTO: 4.5 X10*3/UL (ref 4.4–11.3)

## 2024-02-20 PROCEDURE — 84153 ASSAY OF PSA TOTAL: CPT

## 2024-02-20 PROCEDURE — 84403 ASSAY OF TOTAL TESTOSTERONE: CPT

## 2024-02-20 PROCEDURE — 84154 ASSAY OF PSA FREE: CPT

## 2024-02-20 PROCEDURE — 85025 COMPLETE CBC W/AUTO DIFF WBC: CPT

## 2024-02-20 PROCEDURE — 80053 COMPREHEN METABOLIC PANEL: CPT

## 2024-02-20 PROCEDURE — 36415 COLL VENOUS BLD VENIPUNCTURE: CPT

## 2024-02-21 ENCOUNTER — TELEPHONE (OUTPATIENT)
Dept: RADIATION ONCOLOGY | Facility: HOSPITAL | Age: 79
End: 2024-02-21
Payer: MEDICARE

## 2024-02-21 NOTE — TELEPHONE ENCOUNTER
Called pt. to remind of appointment on 2/26/2024 at 9;00 am with LINDA Jones. Pt's phone went to voicemail left number if needs to reschedule.

## 2024-02-22 LAB
PSA FREE MFR SERPL: NORMAL %
PSA FREE SERPL-MCNC: <0.1 NG/ML
PSA SERPL IA-MCNC: <0.1 NG/ML (ref 0–4)

## 2024-02-23 ENCOUNTER — HOSPITAL ENCOUNTER (OUTPATIENT)
Dept: RADIOLOGY | Facility: CLINIC | Age: 79
Discharge: HOME | End: 2024-02-23
Payer: MEDICARE

## 2024-02-23 DIAGNOSIS — I25.118 CORONARY ARTERY DISEASE OF NATIVE ARTERY OF NATIVE HEART WITH STABLE ANGINA PECTORIS (CMS-HCC): ICD-10-CM

## 2024-02-23 DIAGNOSIS — I71.21 ASCENDING AORTIC ANEURYSM, UNSPECIFIED WHETHER RUPTURED (CMS-HCC): ICD-10-CM

## 2024-02-23 PROCEDURE — 71250 CT THORAX DX C-: CPT

## 2024-02-26 ENCOUNTER — HOSPITAL ENCOUNTER (OUTPATIENT)
Dept: RADIATION ONCOLOGY | Facility: HOSPITAL | Age: 79
Setting detail: RADIATION/ONCOLOGY SERIES
Discharge: HOME | End: 2024-02-26
Payer: MEDICARE

## 2024-02-26 VITALS
RESPIRATION RATE: 18 BRPM | SYSTOLIC BLOOD PRESSURE: 112 MMHG | HEART RATE: 74 BPM | HEIGHT: 68 IN | OXYGEN SATURATION: 99 % | BODY MASS INDEX: 32.89 KG/M2 | DIASTOLIC BLOOD PRESSURE: 71 MMHG | WEIGHT: 217 LBS | TEMPERATURE: 97.5 F

## 2024-02-26 DIAGNOSIS — C61 PROSTATE CANCER (MULTI): Primary | ICD-10-CM

## 2024-02-26 DIAGNOSIS — C61 MALIGNANT NEOPLASM OF PROSTATE (MULTI): ICD-10-CM

## 2024-02-26 DIAGNOSIS — N52.35 ERECTILE DYSFUNCTION FOLLOWING RADIATION THERAPY: ICD-10-CM

## 2024-02-26 PROCEDURE — 99214 OFFICE O/P EST MOD 30 MIN: CPT

## 2024-02-26 ASSESSMENT — ENCOUNTER SYMPTOMS
SHORTNESS OF BREATH: 0
OCCASIONAL FEELINGS OF UNSTEADINESS: 0
PSYCHIATRIC NEGATIVE: 1
COUGH: 0
ARTHRALGIAS: 0
BACK PAIN: 0
FREQUENCY: 0
UNEXPECTED WEIGHT CHANGE: 0
ANAL BLEEDING: 0
DIARRHEA: 0
WEAKNESS: 0
CHEST TIGHTNESS: 0
BLOOD IN STOOL: 0
ABDOMINAL PAIN: 0
LOSS OF SENSATION IN FEET: 0
DIZZINESS: 0
PALPITATIONS: 0
FEVER: 0
HEMATURIA: 0
ALLERGIC/IMMUNOLOGIC NEGATIVE: 1
CONSTIPATION: 0
DYSURIA: 0
RECTAL PAIN: 0
FATIGUE: 0
DEPRESSION: 0
DIFFICULTY URINATING: 0
JOINT SWELLING: 0

## 2024-02-26 ASSESSMENT — PAIN SCALES - GENERAL: PAINLEVEL: 0-NO PAIN

## 2024-02-26 ASSESSMENT — PATIENT HEALTH QUESTIONNAIRE - PHQ9
SUM OF ALL RESPONSES TO PHQ9 QUESTIONS 1 AND 2: 0
1. LITTLE INTEREST OR PLEASURE IN DOING THINGS: NOT AT ALL
2. FEELING DOWN, DEPRESSED OR HOPELESS: NOT AT ALL

## 2024-02-26 NOTE — PROGRESS NOTES
"Cancer synopsis:  Rad/onc: Dr. Malloy/ Karen CNP      78 M initially presented with elevated PSA, transrectal US-guided prostate biopsy showed GS 4+3 disease in 3 samples, 9/12 cores positive. Enrolled in [facility]  010 assigned  to daro and adt 6m w/ SBRT.     PSA:  2/15/23 5.8  1/16/23 6.8  7/8/22 4.5     He had a prostate MRI and PSMA PET/CT showing no evidence of metastatic disease.     Decipher score 0.63     05/25/2023: Started ADT and daro 6m course     SBRT to prostate and SV 06/23/2023     History of presenting illness:    Patient ID: [id number]     Ebenezer Campbell is a 78 y.o. male who presents for his intermediate risk prostate cancer GS 4+3=7, now s/p SBRT to prostate and enrolled in [facility] .    RT Site: Prostate and SV  RT Date: 06/23/2023  Hormone therapy: Yes, completed 11/2023 with 6m of ADT and daro  Hot Flushes: Denies  Fatigue: Denies  Bone pain: Denies  EULALIA: 2  ED: Admits to loss of sexual function since starting systemic therapy. Has used Viagra with variable response. Is concerned at this time. Admits orgasms are now \"dry\"  ED medications: Viagra 100mg  IPSS: 5  Urinary symptoms: Denies dysuria, hematuria. Denies weak stream or incomplete bladder emptying. Does report that with coffee intake increased urgency mild leakage of urine. Denies urgency outside of this. Denies nocturia  Urinary Medications: Yes, flowmax daily  Rectal bleeding: Denies  Other systems: Denies SOB, CP or fever.      Review of systems:  Review of Systems   Constitutional:  Negative for fatigue, fever and unexpected weight change.   Respiratory:  Negative for cough, chest tightness and shortness of breath.    Cardiovascular:  Negative for chest pain, palpitations and leg swelling.   Gastrointestinal:  Negative for abdominal pain, anal bleeding, blood in stool, constipation, diarrhea and rectal pain.   Endocrine: Negative for cold intolerance, heat intolerance and polyuria.   Genitourinary:  Negative for decreased urine " volume, difficulty urinating, dysuria, frequency, hematuria and urgency.   Musculoskeletal:  Negative for arthralgias, back pain, gait problem and joint swelling.   Skin: Negative.    Allergic/Immunologic: Negative.    Neurological:  Negative for dizziness, syncope and weakness.   Psychiatric/Behavioral: Negative.         Past Medical history  Past Medical History:   Diagnosis Date    Contact with and (suspected) exposure to other viral communicable diseases 12/30/2014    Exposure to influenza    Dysphagia, unspecified 04/16/2013    Difficulty swallowing    Impacted cerumen, right ear 02/16/2017    Impacted cerumen of right ear    Obstructive sleep apnea (adult) (pediatric) 10/28/2015    Obstructive sleep apnea on CPAP    Other conditions influencing health status     Nephrolithiasis    Other enthesopathies, not elsewhere classified     Tendonitis of shoulder    Other fatigue     Tired    Other fracture of first lumbar vertebra, initial encounter for closed fracture (CMS/[facility])     Closed fracture of L1 vertebra    Personal history of other diseases of the circulatory system 10/01/2015    History of hypertension    Personal history of other diseases of the musculoskeletal system and connective tissue 12/16/2014    History of low back pain    Personal history of other diseases of the nervous system and sense organs 02/06/2015    History of sleep apnea    Personal history of other diseases of the respiratory system     History of allergic rhinitis    Personal history of other diseases of urinary system 10/28/2015    History of hematuria    Personal history of other specified conditions     History of edema    Residual hemorrhoidal skin tags 04/23/2014    External hemorrhoids    Snoring 05/16/2014    Snoring    Sprain of joints and ligaments of unspecified parts of neck, initial encounter 12/19/2014    Neck sprain    Unspecified visual loss     Vision problem    Vitamin D deficiency, unspecified     Vitamin D deficiency  "       Surgical/family history  Family History   Problem Relation Name Age of Onset    Aortic aneurysm Father      Diabetes Brother      Other (heart valve replaced) Brother      Prostate cancer Brother      Coronary artery disease Other Family History       Past Surgical History:   Procedure Laterality Date    COLONOSCOPY  11/28/2012    Complete Colonoscopy    ESOPHAGOGASTRODUODENOSCOPY  10/28/2016    Esophagogastroduodenoscopy With Biopsy    KNEE ARTHROSCOPY W/ DEBRIDEMENT  04/23/2014    Arthroscopy Knee Left    OTHER SURGICAL HISTORY  01/22/2020    Cataract surgery    OTHER SURGICAL HISTORY  02/04/2022    Esophagogastroduodenoscopy    OTHER SURGICAL HISTORY  11/19/2021    Cardiac catheterization with stent placement    UMBILICAL HERNIA REPAIR  11/28/2012    Umbilical Hernia Repair        Social History  Tobacco Use: Low Risk  (2/26/2024)    Patient History     Smoking Tobacco Use: Never     Smokeless Tobacco Use: Never     Passive Exposure: Not on file         Current med list:  Current Outpatient Medications   Medication Instructions    aspirin 81 mg EC tablet 1 tablet, oral, Daily    atorvastatin (Lipitor) 80 mg tablet TAKE 1 TABLET BY MOUTH EVERY DAY FOR HIGH CHOLESTEROL    cetirizine (ZYRTEC) 10 mg, oral, Daily PRN    cholecalciferol (Vitamin D-3) 50 mcg (2,000 unit) capsule 1 capsule, oral, Daily    fluticasone (Flonase) 50 mcg/actuation nasal spray 1 spray, nasal, 2 times daily    losartan (COZAAR) 50 mg, oral, Daily    MULTIVITAMIN ORAL 1 capsule, oral, Daily    omeprazole (PRILOSEC) 40 mg, oral, Daily before breakfast    tamsulosin (FLOMAX) 0.4 mg      Last recorded vital:  /71   Pulse 74   Temp 36.4 °C (97.5 °F) (Temporal)   Resp 18   Ht 1.727 m (5' 8\")   Wt 98.4 kg (217 lb)   SpO2 99%   BMI 32.99 kg/m²     Physical exam  Physical Exam  Constitutional:       Appearance: Normal appearance.   Cardiovascular:      Rate and Rhythm: Normal rate.   Pulmonary:      Effort: Pulmonary effort is " normal.      Breath sounds: Normal breath sounds.   Musculoskeletal:         General: Normal range of motion.      Cervical back: Normal range of motion.   Neurological:      Mental Status: He is alert and oriented to person, place, and time.   Psychiatric:         Mood and Affect: Mood normal.         Behavior: Behavior normal.         Thought Content: Thought content normal.         Judgment: Judgment normal.         Pertinent labs:  Prostate Specific AG   Date/Time Value Ref Range Status   11/15/2023 08:06 AM <0.01 <=4.00 ng/mL Final     PSA   Date/Time Value Ref Range Status   02/20/2024 10:18 AM <0.1 0.0 - 4.0 ng/mL Final     Comment:     INTERPRETIVE INFORMATION: Prostate Specific Antigen    The Roche PSA electrochemiluminescent immunoassay is used. Results   obtained with different test methods or kits cannot be used   interchangeably. The Roche PSA method is approved for use as an   aid in the detection of prostate cancer when used in conjunction   with a digital rectal exam in individuals with a prostate age 50   years and older. The Roche PSA is also indicated for the serial   measurement of PSA to aid in the prognosis and management of   prostate cancer patients. Elevated PSA concentrations can only   suggest the presence of prostate cancer until biopsy is performed.   PSA concentrations can also be elevated in benign prostatic   hyperplasia or inflammatory conditions of the prostate. PSA is   generally not elevated in healthy individuals or individuals with   nonprostatic carcinoma.         Dx:  Problem List Items Addressed This Visit       Prostate cancer (CMS/HCC) - Primary     Other Visit Diagnoses       Malignant neoplasm of prostate (CMS/HCC)        Relevant Orders    Clinic Appointment Request Follow Up; [facility]OM[facility]ERV[facility]; [facility] [address] [facility] (Completed)           PSA of <0.01 was reviewed and is undectable. Testerone is currently 211 and is rising nicely after completing hormonal therapy. Reviewed  typically will recover in 6-9 months s/p hormonal therapy. At which time hormonal therapy SE will typically resolve. CBC has improved since last visit. Will follow until completely normalized. Review of latent SE including rectal bleeding, hematuria, urinary strictures, ED where reviewed as well as how to contact office if s/s present. Denies latent SE. Does report some ongoing ED however had issues prior. Likely current ED loss is caused by low Testerone. NCCN guidelines where reviewed and routine FUV of every 3m for first year and every 6m for four years for a total of five years was discussed. Patient verbalized understanding.      Reviewed ED as a multi facet issue consisting of psychological, Testerone, nervous and vascular related issues. Discussed RT effecting nervous system can result in changed erectile function combined with age. Discussed use of PDE5i including Viagra and Cialis and mechanism of action being dilation of blood vessels. Reviewed risk associated with usage including stroke, MI, fainting and other serious adverse events. Denies hx of either stroke or MI. Passing princeton II criteria a this time. Reviewed dosing and usage of viagra 100mg. Never to exceed dose of 100mg.   Discussed ED speciliaist as well. Patient declined at this time.    PLAN:  FUV 3m per NRG  010  Labs CBC, CMP, PSA and testerone  Imaging none  Flowmax daily  FUV other providers: PCP for routine evals      Please contact office with any concerns:  Yan Salcedo CNP  [phone]

## 2024-02-27 DIAGNOSIS — I10 ESSENTIAL HYPERTENSION WITH GOAL BLOOD PRESSURE LESS THAN 130/85: ICD-10-CM

## 2024-02-27 RX ORDER — LOSARTAN POTASSIUM 50 MG/1
50 TABLET ORAL DAILY
Qty: 90 TABLET | Refills: 3 | Status: SHIPPED | OUTPATIENT
Start: 2024-02-27 | End: 2025-02-26

## 2024-04-09 ENCOUNTER — OFFICE VISIT (OUTPATIENT)
Dept: PRIMARY CARE | Facility: CLINIC | Age: 79
End: 2024-04-09
Payer: MEDICARE

## 2024-04-09 VITALS
WEIGHT: 219 LBS | RESPIRATION RATE: 16 BRPM | TEMPERATURE: 97.7 F | OXYGEN SATURATION: 95 % | SYSTOLIC BLOOD PRESSURE: 110 MMHG | HEART RATE: 69 BPM | DIASTOLIC BLOOD PRESSURE: 73 MMHG | HEIGHT: 68 IN | BODY MASS INDEX: 33.19 KG/M2

## 2024-04-09 DIAGNOSIS — L05.91 PILONIDAL CYST: Chronic | ICD-10-CM

## 2024-04-09 DIAGNOSIS — R93.1 AGATSTON CORONARY ARTERY CALCIUM SCORE GREATER THAN 400: ICD-10-CM

## 2024-04-09 DIAGNOSIS — D12.6 ADENOMATOUS POLYP OF COLON, UNSPECIFIED PART OF COLON: ICD-10-CM

## 2024-04-09 DIAGNOSIS — Z96.1 BILATERAL ARTIFICIAL LENS IMPLANT: Chronic | ICD-10-CM

## 2024-04-09 DIAGNOSIS — E66.9 CLASS 1 OBESITY WITH SERIOUS COMORBIDITY AND BODY MASS INDEX (BMI) OF 33.0 TO 33.9 IN ADULT, UNSPECIFIED OBESITY TYPE: ICD-10-CM

## 2024-04-09 DIAGNOSIS — Z23 IMMUNIZATION DUE: ICD-10-CM

## 2024-04-09 DIAGNOSIS — E78.5 DYSLIPIDEMIA, GOAL LDL BELOW 70: ICD-10-CM

## 2024-04-09 DIAGNOSIS — I10 ESSENTIAL HYPERTENSION WITH GOAL BLOOD PRESSURE LESS THAN 130/85: ICD-10-CM

## 2024-04-09 DIAGNOSIS — E55.9 VITAMIN D DEFICIENCY: ICD-10-CM

## 2024-04-09 DIAGNOSIS — Z00.00 ROUTINE GENERAL MEDICAL EXAMINATION AT HEALTH CARE FACILITY: Chronic | ICD-10-CM

## 2024-04-09 DIAGNOSIS — C61 PROSTATE CANCER (MULTI): Chronic | ICD-10-CM

## 2024-04-09 DIAGNOSIS — J30.1 SEASONAL ALLERGIC RHINITIS DUE TO POLLEN: ICD-10-CM

## 2024-04-09 DIAGNOSIS — N52.9 ERECTILE DYSFUNCTION, UNSPECIFIED ERECTILE DYSFUNCTION TYPE: Chronic | ICD-10-CM

## 2024-04-09 DIAGNOSIS — G47.33 OBSTRUCTIVE SLEEP APNEA ON CPAP: Chronic | ICD-10-CM

## 2024-04-09 DIAGNOSIS — I25.118 CORONARY ARTERY DISEASE OF NATIVE ARTERY OF NATIVE HEART WITH STABLE ANGINA PECTORIS (CMS-HCC): Primary | ICD-10-CM

## 2024-04-09 PROBLEM — H26.9 EARLY CATARACTS, BILATERAL: Status: RESOLVED | Noted: 2023-04-18 | Resolved: 2024-04-09

## 2024-04-09 PROBLEM — E66.811 CLASS 1 OBESITY WITH SERIOUS COMORBIDITY AND BODY MASS INDEX (BMI) OF 31.0 TO 31.9 IN ADULT: Status: RESOLVED | Noted: 2023-08-18 | Resolved: 2024-04-09

## 2024-04-09 PROBLEM — H53.71 GLARE SENSITIVITY: Status: RESOLVED | Noted: 2023-04-18 | Resolved: 2024-04-09

## 2024-04-09 PROBLEM — Z86.79 H/O: HTN (HYPERTENSION): Status: RESOLVED | Noted: 2019-12-30 | Resolved: 2024-04-09

## 2024-04-09 PROBLEM — J32.9 SINUSITIS: Status: RESOLVED | Noted: 2023-04-18 | Resolved: 2024-04-09

## 2024-04-09 PROCEDURE — 1170F FXNL STATUS ASSESSED: CPT | Performed by: INTERNAL MEDICINE

## 2024-04-09 PROCEDURE — 1160F RVW MEDS BY RX/DR IN RCRD: CPT | Performed by: INTERNAL MEDICINE

## 2024-04-09 PROCEDURE — 1159F MED LIST DOCD IN RCRD: CPT | Performed by: INTERNAL MEDICINE

## 2024-04-09 PROCEDURE — 3074F SYST BP LT 130 MM HG: CPT | Performed by: INTERNAL MEDICINE

## 2024-04-09 PROCEDURE — 1123F ACP DISCUSS/DSCN MKR DOCD: CPT | Performed by: INTERNAL MEDICINE

## 2024-04-09 PROCEDURE — G0439 PPPS, SUBSEQ VISIT: HCPCS | Performed by: INTERNAL MEDICINE

## 2024-04-09 PROCEDURE — 3078F DIAST BP <80 MM HG: CPT | Performed by: INTERNAL MEDICINE

## 2024-04-09 PROCEDURE — 1157F ADVNC CARE PLAN IN RCRD: CPT | Performed by: INTERNAL MEDICINE

## 2024-04-09 PROCEDURE — 99397 PER PM REEVAL EST PAT 65+ YR: CPT | Performed by: INTERNAL MEDICINE

## 2024-04-09 PROCEDURE — 99215 OFFICE O/P EST HI 40 MIN: CPT | Performed by: INTERNAL MEDICINE

## 2024-04-09 RX ORDER — TAMSULOSIN HYDROCHLORIDE 0.4 MG/1
0.4 CAPSULE ORAL DAILY
Status: SHIPPED | COMMUNITY
Start: 2024-04-09

## 2024-04-09 RX ORDER — TADALAFIL 10 MG/1
TABLET ORAL
Qty: 12 TABLET | Refills: 3 | Status: SHIPPED | OUTPATIENT
Start: 2024-04-09

## 2024-04-09 ASSESSMENT — ACTIVITIES OF DAILY LIVING (ADL)
DOING_HOUSEWORK: INDEPENDENT
BATHING: INDEPENDENT
DRESSING: INDEPENDENT
TAKING_MEDICATION: INDEPENDENT
GROCERY_SHOPPING: INDEPENDENT
MANAGING_FINANCES: INDEPENDENT

## 2024-04-09 ASSESSMENT — ENCOUNTER SYMPTOMS
DEPRESSION: 0
LOSS OF SENSATION IN FEET: 0
OCCASIONAL FEELINGS OF UNSTEADINESS: 0

## 2024-04-09 NOTE — PROGRESS NOTES
"Subjective   Reason for Visit: Ebenezer Campbell is an 78 y.o. male here for a Medicare Wellness visit.     Past Medical, Surgical, and Family History reviewed and updated in chart.         Here for follow up and wellness visit.  Overall doing well for the most part.    Overall doing well.  His weight is up a bit through the winter.  He is tending to eat out more with social occasions  Dry skin and itching occasionally noted.    He is concerned about pilonidal issues-which is occasionally flares depending on how he sits.        Patient Care Team:  Oral Castellano MD as PCP - General  [facility]  MARLENE Ortega-CNP as Nurse Practitioner (Gastroenterology)     Review of Systems    Objective   Vitals:  /73 (BP Location: Left arm, Patient Position: Sitting, BP Cuff Size: Adult)   Pulse 69   Temp 36.5 °C (97.7 °F)   Resp 16   Ht 1.727 m (5' 8\")   Wt 99.3 kg (219 lb)   SpO2 95%   BMI 33.30 kg/m²       Physical Exam  Vitals reviewed.   Constitutional:       Appearance: Normal appearance.   HENT:      Head: Normocephalic and atraumatic.   Eyes:      General: No scleral icterus.        Right eye: No discharge.         Left eye: No discharge.      Extraocular Movements: Extraocular movements intact.      Conjunctiva/sclera: Conjunctivae normal.      Pupils: Pupils are equal, round, and reactive to light.   Cardiovascular:      Rate and Rhythm: Normal rate and regular rhythm.      Pulses: Normal pulses.      Heart sounds: Normal heart sounds. No murmur heard.  Pulmonary:      Effort: Pulmonary effort is normal.      Breath sounds: Normal breath sounds. No wheezing or rhonchi.   Genitourinary:     Comments: He located some pain along the pilonidal and upper gluteal crease without active inflammation obvious  Musculoskeletal:         General: No deformity or signs of injury. Normal range of motion.      Cervical back: Normal range of motion and neck supple. No rigidity or " tenderness.      Right lower leg: Edema present.      Left lower leg: Edema present.   Lymphadenopathy:      Cervical: No cervical adenopathy.   Skin:     General: Skin is warm and dry.      Findings: No rash.      Comments: Dry skin noted on his legs   Neurological:      General: No focal deficit present.      Mental Status: He is alert and oriented to person, place, and time. Mental status is at baseline.      Cranial Nerves: No cranial nerve deficit.      Sensory: No sensory deficit.      Gait: Gait normal.   Psychiatric:         Mood and Affect: Mood normal.         Behavior: Behavior normal.         Thought Content: Thought content normal.         Judgment: Judgment normal.         Assessment/Plan   Problem List Items Addressed This Visit       Adenomatous colon polyp    Agatston coronary artery calcium score greater than 400    Relevant Medications    tadalafil (Cialis) 10 mg tablet    Coronary artery disease of native heart with stable angina pectoris (CMS/HCC) - Primary    Relevant Medications    tadalafil (Cialis) 10 mg tablet    Allergic rhinitis    Essential hypertension with goal blood pressure less than 130/85    Dyslipidemia, goal LDL below 70    Obstructive sleep apnea on CPAP    Relevant Orders    Referral to Adult Sleep Medicine    Prostate cancer (CMS/HCC)    Bilateral artificial lens implant    Vitamin D deficiency    Erectile dysfunction    Relevant Medications    tadalafil (Cialis) 10 mg tablet    Pilonidal cyst (Chronic)    Relevant Orders    Referral to Colorectal Surgery     Other Visit Diagnoses       Routine general medical examination at health care facility  (Chronic)       Relevant Orders    1 Year Follow Up In [facility] - PCP - Wellness Exam    Class 1 obesity with serious comorbidity and body mass index (BMI) of 33.0 to 33.9 in adult, unspecified obesity type        Immunization due                Lab on 02/20/2024   Component Date Value Ref Range Status    PSA 02/20/2024  <0.1  0.0 - 4.0 ng/mL Final    INTERPRETIVE INFORMATION: Prostate Specific Antigen    The Roche PSA electrochemiluminescent immunoassay is used. Results   obtained with different test methods or kits cannot be used   interchangeably. The Roche PSA method is approved for use as an   aid in the detection of prostate cancer when used in conjunction   with a digital rectal exam in individuals with a prostate age 50   years and older. The Roche PSA is also indicated for the serial   measurement of PSA to aid in the prognosis and management of   prostate cancer patients. Elevated PSA concentrations can only   suggest the presence of prostate cancer until biopsy is performed.   PSA concentrations can also be elevated in benign prostatic   hyperplasia or inflammatory conditions of the prostate. PSA is   generally not elevated in healthy individuals or individuals with   nonprostatic carcinoma.    PSA, Free 02/20/2024 <0.1  ng/mL Final    PSA, Free Pct 02/20/2024 See Note  % Final    Comment:    The free PSA percentage cannot be calculated due to the low PSA   value. Measurement of the free PSA percentage is recommended for   samples with PSA values between 3 and 10 ng/mL.  INTERPRETIVE INFORMATION: Prostate Specific Antigen, Free                            Percentage    [facility] uses the Roche Free PSA electrochemiluminescent immunoassay   method in conjunction with the Roche PSA electrochemiluminescent   immunoassay method to determine the free PSA percentage. Values   obtained with different assay methods should not be used   interchangeably. The free PSA percentage is an aid in   distinguishing prostate cancer from benign prostatic conditions in   individuals with a prostate age 50 years and older with a total   PSA between 3 and 10 ng/mL and negative digital rectal examination   findings. Prostatic biopsy is required for the diagnosis of   cancer.     In patients with total PSA concentrations of 4-10 ng/mL, the    probability of finding prostate cancer on needle biopsy by                            age in   years is:    %fPSA               50-59    60-69    70 or older  0  - 10%            49%      58%      65%  11 - 18%            27%      34%      41%  19 - 25%            18%      24%      30%  Greater than 25%     9%      12%      16%    Other factors may help determine the actual risk of prostate   cancer in individual patients.  Performed By: [facility]  [address] [place] [address]  : Aram De La Rosa MD, PhD  CLIA Number: [id number]    Testosterone 02/20/2024 211 (L)  240 - 1,000 ng/dL Final    Glucose 02/20/2024 92  74 - 99 mg/dL Final    Sodium 02/20/2024 142  136 - 145 mmol/L Final    Potassium 02/20/2024 3.8  3.5 - 5.3 mmol/L Final    Chloride 02/20/2024 105  98 - 107 mmol/L Final    Bicarbonate 02/20/2024 28  21 - 32 mmol/L Final    Anion Gap 02/20/2024 13  10 - 20 mmol/L Final    Urea Nitrogen 02/20/2024 17  6 - 23 mg/dL Final    Creatinine 02/20/2024 0.87  0.50 - 1.30 mg/dL Final    eGFR 02/20/2024 89  >60 mL/min/1.73m*2 Final    Calculations of estimated GFR are performed using the 2021 CKD-EPI Study Refit equation without the race variable for the IDMS-Traceable creatinine methods.  https://jasn.asnjournals.org/content/early/2021/09/22/ASN.3597747824    Calcium 02/20/2024 10.1  8.6 - 10.3 mg/dL Final    Albumin 02/20/2024 4.3  3.4 - 5.0 g/dL Final    Alkaline Phosphatase 02/20/2024 95  33 - 136 U/L Final    Total Protein 02/20/2024 6.7  6.4 - 8.2 g/dL Final    AST 02/20/2024 23  9 - 39 U/L Final    Bilirubin, Total 02/20/2024 0.7  0.0 - 1.2 mg/dL Final    ALT 02/20/2024 22  10 - 52 U/L Final    Patients treated with Sulfasalazine may generate falsely decreased results for ALT.    WBC 02/20/2024 4.5  4.4 - 11.3 x10*3/uL Final    nRBC 02/20/2024 0.0  0.0 - 0.0 /100 WBCs Final    RBC 02/20/2024 4.32 (L)  4.50 - 5.90 x10*6/uL Final    Hemoglobin 02/20/2024 13.8  13.5 -  17.5 g/dL Final    Hematocrit 02/20/2024 41.3  41.0 - 52.0 % Final    MCV 02/20/2024 96  80 - 100 fL Final    MCH 02/20/2024 31.9  26.0 - 34.0 pg Final    MCHC 02/20/2024 33.4  32.0 - 36.0 g/dL Final    RDW 02/20/2024 13.2  11.5 - 14.5 % Final    Platelets 02/20/2024 304  150 - 450 x10*3/uL Final    Neutrophils % 02/20/2024 49.1  40.0 - 80.0 % Final    Immature Granulocytes %, Automated 02/20/2024 0.2  0.0 - 0.9 % Final    Immature Granulocyte Count (IG) includes promyelocytes, myelocytes and metamyelocytes but does not include bands. Percent differential counts (%) should be interpreted in the context of the absolute cell counts (cells/UL).    Lymphocytes % 02/20/2024 33.3  13.0 - 44.0 % Final    Monocytes % 02/20/2024 9.6  2.0 - 10.0 % Final    Eosinophils % 02/20/2024 6.7  0.0 - 6.0 % Final    Basophils % 02/20/2024 1.1  0.0 - 2.0 % Final    Neutrophils Absolute 02/20/2024 2.21  1.60 - 5.50 x10*3/uL Final    Percent differential counts (%) should be interpreted in the context of the absolute cell counts (cells/uL).    Immature Granulocytes Absolute, Au* 02/20/2024 0.01  0.00 - 0.50 x10*3/uL Final    Lymphocytes Absolute 02/20/2024 1.50  0.80 - 3.00 x10*3/uL Final    Monocytes Absolute 02/20/2024 0.43  0.05 - 0.80 x10*3/uL Final    Eosinophils Absolute 02/20/2024 0.30  0.00 - 0.40 x10*3/uL Final    Basophils Absolute 02/20/2024 0.05  0.00 - 0.10 x10*3/uL Final         We spoke over 40 minutes, over half the time counseling    Living situation-he lives alone, independently still drives.  His children are out of town.       Prostate cancer-treated with radiation therapy and then androgen deprivation therapy.  He is followed by Dr. Rincon in urology.  He is in a clinical trial.  He will continue per protocol.  He also plans to [place] back with his original urologist, Dr. Marin              2/24-PSA remains undetectable.  He will continue to follow with his urologist semiannually.     Coronary artery disease status  post cardiac stent placed 11/13/2020- he has done well following the procedure. At this point cardiac rehab has not deemed to be necessary he states.                He will continue his elliptical workouts and follow-up with his cardiologist, Dr. Sethi as directed semiannually-next appointment 6/24     Elevated calcium score / widened aortic arch - calcium score = 1837 noted on Oct '20 CT calcium score. He'll see Dr. Vasquez i now semiannually-following his cardiac catheterization and stent placement November 2020.     Hypertension- BP improved w/ losartan. He will continue healthy lifestyle efforts    Edema-encourage low salt diet along with knee-high compression hose such as Dr. Thurman or some other similar brand     Elevated weight w/ BMI over 30- He will continue efforts accordingly. Unfortunately BMI remains in the 31 range. He will continue to work to improve this. He has been traveling a lot since his wife passed away. He will work towards weight loss in the new year.           4/24-BMI 33.3.  I informed patient BMI> 30, w/ recommendations for nutrition and exercise plan to help achieve weight reduction.  We spoke at length regarding measures to help improve weight with calorie reduction and more exercise as the foundation of an ongoing long-term weight loss plan.       Dyslipidemia he will continue the maximum atorvastatin following his cardiac stent placement.  Goal LDL under 70.          11/23.  LDL 61.             Lipids ordered for next time.    Exercise routine-typically on his home recumbent cycle. He golfs- walks 2 x wk, and does yardwork as well   Presently back on his elliptical 4 times a week, for 30 minutes with a pulse goal around 125    Swallowing concerns-with liquids every month or so he states he chokes and feels like the liquid goes down the wrong way.  I recommended further evaluation with a GI consultation     Dyspepsia He does have a history of dyspepsia, with then EGD in May 2013 showing  some acid erosions. Another EGD in April 2017 was unremarkable.            Presently without acid reflux issues      Disequilibrium-though he has a history of dysfunctional eustachian tubes that's presently not an issue. Encouraged ongoing Flonase and Zyrtec and the antibiotics for the sinusitis. He will use caution with position changes understanding that he could be at risk for falling if he is dizzy. He will notify me with ongoing concerns   Fortunately no balance problems of late     Headache syndrome- His CAT scan was nondiagnostic and cervical spine x-ray does confirm some arthritis. Presently headaches improved, and he canceled his neurology consultation.        Seasonal allergies. Suggested restarting Flonase twice daily and Zyrtec 10 mg daily for any sinus pressure        TSH- slightly elevated on February labs-he will reassess him in July. Reviewed his multivitamin which does have biotin and it but he states he is use this for years. Will check TSH before next time     RE lumbar spasms-this has happened on several instances, with x-ray showing lumbar arthritis July 2015 and again in November 2009. Without recent trauma we'll defer x-rays presently. Instructions written out to optimize compliance. He will use meloxicam for 7-10 days as the anti-inflammatory, Skelaxin nightly for a week or so for the muscle relaxer. Heating pad and stretching twice daily for 30 minutes recommended and follow-up with physical therapy to optimize long-term stretching routine. He will call if not improved   No back pain w/ weights routine        Peripheral vascular concerns with Elevated calcium score- carotid study unremarkable. Abdominal aortic aneurysm unremarkable November 2019. Carotid duplex unremarkable November 2020.           Presently asymptomatic without claudication symptoms    Osteoarthritis-advanced arthritic changes in his hands.  He will use caution with overuse     Cervical spasms-mainly left-sided-with some  "tension headaches left lateral lumbar spasms-including encouraged heating pad and stretch.     Right heel discomfort-improved presently. He did have an MRI- showing Achilles tendinitis among other findings-residual changes-he remembers he tore his Achilles age 24 in the right side.              10/23-he has had more heel pain posteriorly recently-discussed the possibility of bursitis.  He will [place] back with podiatry-Dr. Tellez       Feet pain-improved with orthotics.      History of left knee arthroscopy/DJD- not problematic with weight loss and elliptical workouts. Doing well.     Right shoulder - still occas \"twinge\" w/ certain movements, now daily. He will notify me if he would like further evaluation. [place] declined. Instead he'll do exercises. Fortunately normal range of motion without rotator cuff worrisome findings on exam.        Family Hx thoracic aneurysms - 2 older brothers - his chest CT early 2017 showed normal. Report provided for him        Acid reflux-less problematic with weight loss. Occasionally problematic. EGD 12/21 completed without worrisome findings on biopsy.    He has since discontinued PPI. No active hiatal hernia issues fortunately. Encouraged him to use the PPI before triggers     Colon polyps - noted on 2/17 colonoscopy, and on 12/19 colonoscopy.Next in 5 yrs - Dec '24    Pilonidal cyst concern-4/24-he will meet with the colorectal team to evaluate further for the occasional pain in his coccyx area     Right inguinal hernia/ventral hernia/history of umbilical hernia repair-not problematic    Erectile dysfunction-sildenafil not that helpful in the past.          4/24-suggested a trial of Cialis     Sleep apnea- presently not on CPAP. he rec'd new mask Rx per Dr. Viera spring '18, before he retired. He's not rec'd it and so remains off CPAP            4/24-I asked him to [place] back with the sleep team for reevaluation        Scalp skin lesions-encouraged dermatology " consultation-he plans to see his wife, Dr. Ann     Dental care- encouraged semiannual dental visits.     Home itching-occasionally at night recently-history and exam nondiagnostic no new medications lotions soaps detergents suggested topical Benadryl and follow-up if not improved              -encouraged cooler showers, unscented Dove soap rather than [place], and Cetaphil lotion daily after shower     Vision care / early cataracts - annual appt soon. He has a Rx for distance. Dr. Rashid recently noted cataracts have advanced a bit. Follow-up peripheral vascular concerns-w/ Dr. Jeremy Narvaez [facility]       Bereavement-support provided regarding the sudden passing of his wife with a dissecting aortic [place] at the clinic just before 2021. -He now has 8 grandchildren-living out of town he will continue to travel. Remains busy as he begins to de-cluttering his house-with 6 bedrooms he notes.              Looking forward to the holidays with his family       Living will and medical power of  completed forms reviewed/filed at his  visit   I suggested he update these as his  wife is listed as his primary agent on his medical power of                -these were not located in the new EMR.  Will search the old to get these transferred        High dose flu shot annually after Labor Day- updated 10/23     Shingrix series completed      Covid series completed, booster too- 10/23    RSV - suggested      Follow-up this fall [place] 300 family members -as he had7 siblings and one of his parents had 16 siblings      charting was completed using voice recognition technology and may include unintended errors.     F

## 2024-04-10 ASSESSMENT — ACTIVITIES OF DAILY LIVING (ADL)
ADEQUATE_TO_COMPLETE_ADL: YES
PATIENT'S MEMORY ADEQUATE TO SAFELY COMPLETE DAILY ACTIVITIES?: YES
TOILETING: INDEPENDENT
FEEDING YOURSELF: INDEPENDENT
ASSISTIVE_DEVICE: EYEGLASSES
WALKS IN HOME: INDEPENDENT
JUDGMENT_ADEQUATE_SAFELY_COMPLETE_DAILY_ACTIVITIES: YES
GROOMING: INDEPENDENT

## 2024-04-15 ENCOUNTER — HOSPITAL ENCOUNTER (OUTPATIENT)
Dept: RADIOLOGY | Facility: CLINIC | Age: 79
Discharge: HOME | End: 2024-04-15
Payer: MEDICARE

## 2024-04-15 ENCOUNTER — OFFICE VISIT (OUTPATIENT)
Dept: ORTHOPEDIC SURGERY | Facility: CLINIC | Age: 79
End: 2024-04-15
Payer: MEDICARE

## 2024-04-15 DIAGNOSIS — M25.561 RIGHT KNEE PAIN, UNSPECIFIED CHRONICITY: ICD-10-CM

## 2024-04-15 DIAGNOSIS — M25.569 KNEE PAIN, UNSPECIFIED CHRONICITY, UNSPECIFIED LATERALITY: Primary | ICD-10-CM

## 2024-04-15 DIAGNOSIS — S83.241S ACUTE MEDIAL MENISCUS TEAR OF RIGHT KNEE, SEQUELA: ICD-10-CM

## 2024-04-15 PROCEDURE — 73564 X-RAY EXAM KNEE 4 OR MORE: CPT | Mod: RT

## 2024-04-15 PROCEDURE — 1036F TOBACCO NON-USER: CPT | Performed by: ORTHOPAEDIC SURGERY

## 2024-04-15 PROCEDURE — 1160F RVW MEDS BY RX/DR IN RCRD: CPT | Performed by: ORTHOPAEDIC SURGERY

## 2024-04-15 PROCEDURE — 1123F ACP DISCUSS/DSCN MKR DOCD: CPT | Performed by: ORTHOPAEDIC SURGERY

## 2024-04-15 PROCEDURE — 99214 OFFICE O/P EST MOD 30 MIN: CPT | Performed by: ORTHOPAEDIC SURGERY

## 2024-04-15 PROCEDURE — 1157F ADVNC CARE PLAN IN RCRD: CPT | Performed by: ORTHOPAEDIC SURGERY

## 2024-04-15 PROCEDURE — 1159F MED LIST DOCD IN RCRD: CPT | Performed by: ORTHOPAEDIC SURGERY

## 2024-04-15 NOTE — PROGRESS NOTES
History of Present Illness:   The patient presents today endorsing right knee pain. The pain localizes over the medial joint line.  The patient denies any recent trauma and notes the insidious onset of pain.  The pain is achy, constant, worse with activity and better with rest. The patient notes occasional locking, catching and giving out.  The patient has tried the following modalities: Rest, ice, anti-inflammatories.    He does have a history of a left meniscus tear and states this feels similar.    Past Medical History:   Diagnosis Date    Contact with and (suspected) exposure to other viral communicable diseases 12/30/2014    Exposure to influenza    Dysphagia, unspecified 04/16/2013    Difficulty swallowing    Early cataracts, bilateral 04/18/2023    Impacted cerumen, right ear 02/16/2017    Impacted cerumen of right ear    Obstructive sleep apnea (adult) (pediatric) 10/28/2015    Obstructive sleep apnea on CPAP    Other conditions influencing health status     Nephrolithiasis    Other enthesopathies, not elsewhere classified     Tendonitis of shoulder    Other fatigue     Tired    Other fracture of first lumbar vertebra, initial encounter for closed fracture (Multi)     Closed fracture of L1 vertebra    Personal history of other diseases of the circulatory system 10/01/2015    History of hypertension    Personal history of other diseases of the musculoskeletal system and connective tissue 12/16/2014    History of low back pain    Personal history of other diseases of the nervous system and sense organs 02/06/2015    History of sleep apnea    Personal history of other diseases of the respiratory system     History of allergic rhinitis    Personal history of other diseases of urinary system 10/28/2015    History of hematuria    Personal history of other specified conditions     History of edema    Residual hemorrhoidal skin tags 04/23/2014    External hemorrhoids    Snoring 05/16/2014    Snoring    Sprain of  joints and ligaments of unspecified parts of neck, initial encounter 12/19/2014    Neck sprain    Unspecified visual loss     Vision problem    Vitamin D deficiency, unspecified     Vitamin D deficiency     Past Surgical History:   Procedure Laterality Date    COLONOSCOPY  11/28/2012    Complete Colonoscopy    ESOPHAGOGASTRODUODENOSCOPY  10/28/2016    Esophagogastroduodenoscopy With Biopsy    KNEE ARTHROSCOPY W/ DEBRIDEMENT  04/23/2014    Arthroscopy Knee Left    OTHER SURGICAL HISTORY  01/22/2020    Cataract surgery    OTHER SURGICAL HISTORY  02/04/2022    Esophagogastroduodenoscopy    OTHER SURGICAL HISTORY  11/19/2021    Cardiac catheterization with stent placement    UMBILICAL HERNIA REPAIR  11/28/2012    Umbilical Hernia Repair       Current Outpatient Medications:     aspirin 81 mg EC tablet, Take 1 tablet (81 mg) by mouth once daily., Disp: , Rfl:     atorvastatin (Lipitor) 80 mg tablet, TAKE 1 TABLET BY MOUTH EVERY DAY FOR HIGH CHOLESTEROL, Disp: 90 tablet, Rfl: 3    cetirizine (ZyrTEC) 5 mg tablet, Take 2 tablets (10 mg) by mouth once daily as needed for allergies., Disp: , Rfl:     cholecalciferol (Vitamin D-3) 50 mcg (2,000 unit) capsule, Take 1 capsule (50 mcg) by mouth once daily., Disp: , Rfl:     fluticasone (Flonase) 50 mcg/actuation nasal spray, Administer 1 spray into affected nostril(s) 2 times a day., Disp: , Rfl:     losartan (Cozaar) 50 mg tablet, Take 1 tablet (50 mg) by mouth once daily., Disp: 90 tablet, Rfl: 3    MULTIVITAMIN ORAL, Take 1 capsule by mouth once daily., Disp: , Rfl:     omeprazole (PriLOSEC) 40 mg DR capsule, Take 1 capsule (40 mg) by mouth once daily in the morning. Take before meals., Disp: 90 capsule, Rfl: 3    tadalafil (Cialis) 10 mg tablet, 1/2 or 1 tablet daily as needed for erectile dysfunction, Disp: 12 tablet, Rfl: 3    tamsulosin (Flomax) 0.4 mg 24 hr capsule, Take 1 capsule (0.4 mg) by mouth once daily., Disp: , Rfl:     Review of Systems   GENERAL: Negative for  malaise, significant weight loss, fever  MUSCULOSKELETAL: See HPI  NEURO:  Negative for numbness / tingling     Physical Examination:  Right Knee:  Skin healthy and intact  No gross swelling or ecchymosis  No significant varus or valgus malalignment  Effusion: absent    ROM:  Full flexion   Full extension  No pain with internal rotation of the hip  Tenderness to palpation:  medial joint line   No laxity to valgus stress  No laxity to varus stress  Negative Lachman´s test  Negative anterior drawer test  Negative posterior drawer test  Positive Rowena´s test  Neurovascular exam normal distally    Imaging:  Mild degenerative changes    Assessment:    Patient with right knee pain concern for meniscal tear      Plan:  We discussed with the patient concern for a meniscal tear.   We reviewed the natural history of meniscal pathology and discussed the implications for the health of the joint.  Various treatment options were discussed and the patient elected for MRI for further evaluation.      He does state that he has some discomfort in the area of his glute and posterior thigh we discussed the possibility that the instability may be related to mild sciatica and weakness in the leg but it certainly does appear to localize more to the knee.

## 2024-04-26 ENCOUNTER — OFFICE VISIT (OUTPATIENT)
Dept: SURGERY | Facility: CLINIC | Age: 79
End: 2024-04-26
Payer: MEDICARE

## 2024-04-26 VITALS
DIASTOLIC BLOOD PRESSURE: 64 MMHG | HEIGHT: 68 IN | WEIGHT: 218 LBS | TEMPERATURE: 97.8 F | HEART RATE: 68 BPM | BODY MASS INDEX: 33.04 KG/M2 | SYSTOLIC BLOOD PRESSURE: 116 MMHG

## 2024-04-26 DIAGNOSIS — L05.91 PILONIDAL CYST: Chronic | ICD-10-CM

## 2024-04-26 PROCEDURE — 99213 OFFICE O/P EST LOW 20 MIN: CPT | Performed by: NURSE PRACTITIONER

## 2024-04-26 PROCEDURE — 3078F DIAST BP <80 MM HG: CPT | Performed by: NURSE PRACTITIONER

## 2024-04-26 PROCEDURE — 1159F MED LIST DOCD IN RCRD: CPT | Performed by: NURSE PRACTITIONER

## 2024-04-26 PROCEDURE — 1157F ADVNC CARE PLAN IN RCRD: CPT | Performed by: NURSE PRACTITIONER

## 2024-04-26 PROCEDURE — 1036F TOBACCO NON-USER: CPT | Performed by: NURSE PRACTITIONER

## 2024-04-26 PROCEDURE — 1160F RVW MEDS BY RX/DR IN RCRD: CPT | Performed by: NURSE PRACTITIONER

## 2024-04-26 PROCEDURE — 3074F SYST BP LT 130 MM HG: CPT | Performed by: NURSE PRACTITIONER

## 2024-04-26 PROCEDURE — 99203 OFFICE O/P NEW LOW 30 MIN: CPT | Performed by: NURSE PRACTITIONER

## 2024-04-26 PROCEDURE — 1123F ACP DISCUSS/DSCN MKR DOCD: CPT | Performed by: NURSE PRACTITIONER

## 2024-04-26 NOTE — PROGRESS NOTES
Chief complaint:  Pilonidal cyst    History of present illness:  Ebenezer Campbell with h/o pilonidal cyst in college. At that time he had problems with the cyst filling up and draining on and off. He never had it drained, it would always drain on it's own. Over the years he has had issues on and off with some tailbone tenderness. The area does not swell or drain. Just tenderness if he sits for long periods of time. About a month ago he had pain shooting down his buttock which caused gait problem. Now he is having right knee pain. He is getting and MRI of the knee in the near future.    Has 5 children and 8 grand children.      PMH: CAD, GERD, DLD prostate cancer treated with radiation last year (completed 5/23)  PSH: Left knee surgery, Cardiac stent x 2. Umbilical hernia  Tobacco use: Denies    Colonoscopy: 2019. Due for repeat colonoscopy later this year.    Review of Systems     Constitutional: Negative for fever, chills, anorexia, weight loss, malaise   Respiratory: Negative for cough, hemoptysis, wheezing, shortness of breath  Cardiac: Negative for chest pain, dyspnea on exertion, orthopnea, palpitations, syncope. + CAD  Gastrointestinal: Negative for nausea, vomiting, diarrhea, constipation, abdominal pain  Genitourinary: Negative for discharge, dysuria, flank pain, frequency, hematuria. +H/o prostate cancer  Musculoskeletal: Negative for decreased ROM, pain, swelling, weakness. +right knee pain  Neurological: Negative for dizziness, confusion, headache, seizures, syncope   Psychiatric: Negative for mood changes, anxiety, hallucinations, sleep changes, suicidal ideas  Skin: Negative for mass, pain, itching, rash, ulcer   Endocrine: Negative for heat intolerance, cold intolerance, excessive sweating, polyuria, excess thirst   Hematologic/Lymph: Negative for anemia, bruising, easy bleeding, night sweats, petechiae, history of DVT/PE. + prostate cancer treated with radiation  Allergic/Immunologic: Negative for  anaphylaxis, itchy/ teary eyes, itching, sneezing, swelling      Physical Exam   Constitutional: Well developed, awake/alert/oriented x3, no distress, alert and cooperative   Eyes: Sclera anicteric, no conjunctival inflammation, conjugate gaze   ENMT: mucous membranes moist, no apparent injury,   Head/Neck: Neck supple, no apparent injury, trachea midline, no bruits   Respiratory/Thorax: Patent airways, CTAB, normal breath sounds with good chest expansion  Cardiovascular: Regular, rate and rhythm, no murmurs, normal S1 and S2   Gastrointestinal: Nondistended, soft, non-tender,megaly.  Extremities: normal extremities, no edema  Neurological: alert and oriented x3, normal strength, Normal gait   Psychological: Appropriate mood and behavior   Skin: Warm and dry, no lesions, no rashes   Gluteal cleft with 3 pits noted. No swelling, redness, or drainage. Non-tender  A chaperone was present during the exam, MARIN Toussiant.    Assessment:  Remote h/o of pilonidal cyst  Currently not active    Plan:   Follow-up with me if any active symptoms

## 2024-04-26 NOTE — PATIENT INSTRUCTIONS
It was a pleasure to see you in the office today.  If you have any questions or concerns, please let me know.    Sincerely,  Radha Hargrove  Certified Nurse Practitioner-Colorectal surgery    [email or web address]  [phone] (Confidential voice mail)

## 2024-04-30 ENCOUNTER — PREP FOR PROCEDURE (OUTPATIENT)
Dept: ORTHOPEDIC SURGERY | Facility: CLINIC | Age: 79
End: 2024-04-30
Payer: MEDICARE

## 2024-04-30 ENCOUNTER — HOSPITAL ENCOUNTER (OUTPATIENT)
Dept: RADIOLOGY | Facility: CLINIC | Age: 79
Discharge: HOME | End: 2024-04-30
Payer: MEDICARE

## 2024-04-30 ENCOUNTER — TELEPHONE (OUTPATIENT)
Dept: ORTHOPEDIC SURGERY | Facility: CLINIC | Age: 79
End: 2024-04-30
Payer: MEDICARE

## 2024-04-30 DIAGNOSIS — S83.241S ACUTE MEDIAL MENISCUS TEAR OF RIGHT KNEE, SEQUELA: Primary | ICD-10-CM

## 2024-04-30 DIAGNOSIS — S83.241S ACUTE MEDIAL MENISCUS TEAR OF RIGHT KNEE, SEQUELA: ICD-10-CM

## 2024-04-30 DIAGNOSIS — M25.561 RIGHT KNEE PAIN, UNSPECIFIED CHRONICITY: ICD-10-CM

## 2024-04-30 PROBLEM — S83.241A OTHER TEAR OF MEDIAL MENISCUS, CURRENT INJURY, RIGHT KNEE, INITIAL ENCOUNTER: Status: ACTIVE | Noted: 2024-04-30

## 2024-04-30 PROCEDURE — 73721 MRI JNT OF LWR EXTRE W/O DYE: CPT | Mod: RT

## 2024-04-30 PROCEDURE — 73721 MRI JNT OF LWR EXTRE W/O DYE: CPT | Mod: RIGHT SIDE | Performed by: RADIOLOGY

## 2024-04-30 NOTE — TELEPHONE ENCOUNTER
We contacted the patient regarding the MRI he does have some evidence of mild to moderate osteoarthritis but does have a complex tear of the medial meniscus.  We reviewed with instability in the leg certainly feel that meniscectomy may be warranted to help improve his confidence.    We discussed arthroscopy versus nonsurgical treatment for the patient´s meniscal tear.  We reviewed the blood supply to the meniscus, limited healing potential and meniscectomy versus meniscal repair.  The risks of the procedure were mentioned, including wound issues, deep venous thrombosis, pulmonary embolism and medical complications.  The anticipated timeframe for recovery and return to activity were outlined.  We discussed formal physical therapy versus home exercise program.    We mentioned the possibility of incomplete relief of pain, particularly if any chondral defects are encountered and the possibility of arthrosis of the knee related to long-term deficiencies of the meniscus.

## 2024-05-01 ENCOUNTER — LAB (OUTPATIENT)
Dept: LAB | Facility: LAB | Age: 79
End: 2024-05-01
Payer: MEDICARE

## 2024-05-01 DIAGNOSIS — C61 MALIGNANT NEOPLASM OF PROSTATE (MULTI): ICD-10-CM

## 2024-05-01 DIAGNOSIS — Z01.818 PREOP TESTING: ICD-10-CM

## 2024-05-01 LAB
ALBUMIN SERPL BCP-MCNC: 3.9 G/DL (ref 3.4–5)
ALP SERPL-CCNC: 91 U/L (ref 33–136)
ALT SERPL W P-5'-P-CCNC: 15 U/L (ref 10–52)
ANION GAP SERPL CALC-SCNC: 9 MMOL/L (ref 10–20)
AST SERPL W P-5'-P-CCNC: 16 U/L (ref 9–39)
BASOPHILS # BLD AUTO: 0.08 X10*3/UL (ref 0–0.1)
BASOPHILS NFR BLD AUTO: 1.5 %
BILIRUB SERPL-MCNC: 0.4 MG/DL (ref 0–1.2)
BUN SERPL-MCNC: 29 MG/DL (ref 6–23)
CALCIUM SERPL-MCNC: 9.6 MG/DL (ref 8.6–10.3)
CHLORIDE SERPL-SCNC: 110 MMOL/L (ref 98–107)
CO2 SERPL-SCNC: 29 MMOL/L (ref 21–32)
CREAT SERPL-MCNC: 0.85 MG/DL (ref 0.5–1.3)
EGFRCR SERPLBLD CKD-EPI 2021: 89 ML/MIN/1.73M*2
EOSINOPHIL # BLD AUTO: 0.38 X10*3/UL (ref 0–0.4)
EOSINOPHIL NFR BLD AUTO: 7.2 %
ERYTHROCYTE [DISTWIDTH] IN BLOOD BY AUTOMATED COUNT: 13.5 % (ref 11.5–14.5)
GLUCOSE SERPL-MCNC: 112 MG/DL (ref 74–99)
HCT VFR BLD AUTO: 38.9 % (ref 41–52)
HGB BLD-MCNC: 12.7 G/DL (ref 13.5–17.5)
IMM GRANULOCYTES # BLD AUTO: 0.01 X10*3/UL (ref 0–0.5)
IMM GRANULOCYTES NFR BLD AUTO: 0.2 % (ref 0–0.9)
INR PPP: 0.9 (ref 0.9–1.1)
LYMPHOCYTES # BLD AUTO: 1.67 X10*3/UL (ref 0.8–3)
LYMPHOCYTES NFR BLD AUTO: 31.5 %
MCH RBC QN AUTO: 31 PG (ref 26–34)
MCHC RBC AUTO-ENTMCNC: 32.6 G/DL (ref 32–36)
MCV RBC AUTO: 95 FL (ref 80–100)
MONOCYTES # BLD AUTO: 0.48 X10*3/UL (ref 0.05–0.8)
MONOCYTES NFR BLD AUTO: 9 %
NEUTROPHILS # BLD AUTO: 2.69 X10*3/UL (ref 1.6–5.5)
NEUTROPHILS NFR BLD AUTO: 50.6 %
NRBC BLD-RTO: 0 /100 WBCS (ref 0–0)
PLATELET # BLD AUTO: 268 X10*3/UL (ref 150–450)
POTASSIUM SERPL-SCNC: 4.3 MMOL/L (ref 3.5–5.3)
PROT SERPL-MCNC: 6.1 G/DL (ref 6.4–8.2)
PROTHROMBIN TIME: 10.4 SECONDS (ref 9.8–12.8)
RBC # BLD AUTO: 4.1 X10*6/UL (ref 4.5–5.9)
SODIUM SERPL-SCNC: 144 MMOL/L (ref 136–145)
TESTOST SERPL-MCNC: 278 NG/DL (ref 240–1000)
WBC # BLD AUTO: 5.3 X10*3/UL (ref 4.4–11.3)

## 2024-05-01 PROCEDURE — 80053 COMPREHEN METABOLIC PANEL: CPT

## 2024-05-01 PROCEDURE — 85610 PROTHROMBIN TIME: CPT

## 2024-05-01 PROCEDURE — 84403 ASSAY OF TOTAL TESTOSTERONE: CPT

## 2024-05-01 PROCEDURE — 85025 COMPLETE CBC W/AUTO DIFF WBC: CPT

## 2024-05-01 PROCEDURE — 84153 ASSAY OF PSA TOTAL: CPT

## 2024-05-01 PROCEDURE — 36415 COLL VENOUS BLD VENIPUNCTURE: CPT

## 2024-05-01 PROCEDURE — 84154 ASSAY OF PSA FREE: CPT

## 2024-05-04 LAB
PSA FREE MFR SERPL: <50 %
PSA FREE SERPL-MCNC: <0.1 NG/ML
PSA SERPL IA-MCNC: 0.2 NG/ML (ref 0–4)

## 2024-05-06 ENCOUNTER — ANESTHESIA EVENT (OUTPATIENT)
Dept: OPERATING ROOM | Facility: HOSPITAL | Age: 79
End: 2024-05-06
Payer: MEDICARE

## 2024-05-06 ENCOUNTER — ANESTHESIA (OUTPATIENT)
Dept: OPERATING ROOM | Facility: HOSPITAL | Age: 79
End: 2024-05-06
Payer: MEDICARE

## 2024-05-06 ENCOUNTER — HOSPITAL ENCOUNTER (OUTPATIENT)
Facility: HOSPITAL | Age: 79
Setting detail: OUTPATIENT SURGERY
Discharge: HOME | End: 2024-05-06
Attending: ORTHOPAEDIC SURGERY | Admitting: ORTHOPAEDIC SURGERY
Payer: MEDICARE

## 2024-05-06 VITALS
RESPIRATION RATE: 18 BRPM | SYSTOLIC BLOOD PRESSURE: 122 MMHG | BODY MASS INDEX: 31.4 KG/M2 | TEMPERATURE: 96.8 F | HEIGHT: 69 IN | OXYGEN SATURATION: 96 % | DIASTOLIC BLOOD PRESSURE: 68 MMHG | HEART RATE: 65 BPM | WEIGHT: 212 LBS

## 2024-05-06 DIAGNOSIS — G89.18 POST-OPERATIVE PAIN: Primary | ICD-10-CM

## 2024-05-06 DIAGNOSIS — S83.241A OTHER TEAR OF MEDIAL MENISCUS, CURRENT INJURY, RIGHT KNEE, INITIAL ENCOUNTER: ICD-10-CM

## 2024-05-06 DIAGNOSIS — M25.569 ARTHRALGIA OF LOWER LEG, UNSPECIFIED LATERALITY: ICD-10-CM

## 2024-05-06 PROCEDURE — 2500000005 HC RX 250 GENERAL PHARMACY W/O HCPCS: Performed by: NURSE ANESTHETIST, CERTIFIED REGISTERED

## 2024-05-06 PROCEDURE — 7100000001 HC RECOVERY ROOM TIME - INITIAL BASE CHARGE: Performed by: ORTHOPAEDIC SURGERY

## 2024-05-06 PROCEDURE — 2500000004 HC RX 250 GENERAL PHARMACY W/ HCPCS (ALT 636 FOR OP/ED): Performed by: NURSE ANESTHETIST, CERTIFIED REGISTERED

## 2024-05-06 PROCEDURE — 3700000001 HC GENERAL ANESTHESIA TIME - INITIAL BASE CHARGE: Performed by: ORTHOPAEDIC SURGERY

## 2024-05-06 PROCEDURE — 3700000002 HC GENERAL ANESTHESIA TIME - EACH INCREMENTAL 1 MINUTE: Performed by: ORTHOPAEDIC SURGERY

## 2024-05-06 PROCEDURE — 2720000007 HC OR 272 NO HCPCS: Performed by: ORTHOPAEDIC SURGERY

## 2024-05-06 PROCEDURE — 29881 ARTHRS KNE SRG MNISECTMY M/L: CPT | Performed by: ORTHOPAEDIC SURGERY

## 2024-05-06 PROCEDURE — 3600000009 HC OR TIME - EACH INCREMENTAL 1 MINUTE - PROCEDURE LEVEL FOUR: Performed by: ORTHOPAEDIC SURGERY

## 2024-05-06 PROCEDURE — 7100000002 HC RECOVERY ROOM TIME - EACH INCREMENTAL 1 MINUTE: Performed by: ORTHOPAEDIC SURGERY

## 2024-05-06 PROCEDURE — 7100000009 HC PHASE TWO TIME - INITIAL BASE CHARGE: Performed by: ORTHOPAEDIC SURGERY

## 2024-05-06 PROCEDURE — 7100000010 HC PHASE TWO TIME - EACH INCREMENTAL 1 MINUTE: Performed by: ORTHOPAEDIC SURGERY

## 2024-05-06 PROCEDURE — 3600000004 HC OR TIME - INITIAL BASE CHARGE - PROCEDURE LEVEL FOUR: Performed by: ORTHOPAEDIC SURGERY

## 2024-05-06 PROCEDURE — 2500000001 HC RX 250 WO HCPCS SELF ADMINISTERED DRUGS (ALT 637 FOR MEDICARE OP): Performed by: ANESTHESIOLOGY

## 2024-05-06 PROCEDURE — 2500000005 HC RX 250 GENERAL PHARMACY W/O HCPCS: Performed by: ORTHOPAEDIC SURGERY

## 2024-05-06 RX ORDER — DIPHENHYDRAMINE HYDROCHLORIDE 50 MG/ML
12.5 INJECTION INTRAMUSCULAR; INTRAVENOUS ONCE AS NEEDED
Status: DISCONTINUED | OUTPATIENT
Start: 2024-05-06 | End: 2024-05-06 | Stop reason: HOSPADM

## 2024-05-06 RX ORDER — ONDANSETRON HYDROCHLORIDE 2 MG/ML
INJECTION, SOLUTION INTRAVENOUS AS NEEDED
Status: DISCONTINUED | OUTPATIENT
Start: 2024-05-06 | End: 2024-05-06

## 2024-05-06 RX ORDER — ALBUTEROL SULFATE 0.83 MG/ML
2.5 SOLUTION RESPIRATORY (INHALATION) ONCE AS NEEDED
Status: DISCONTINUED | OUTPATIENT
Start: 2024-05-06 | End: 2024-05-06 | Stop reason: HOSPADM

## 2024-05-06 RX ORDER — PROPOFOL 10 MG/ML
INJECTION, EMULSION INTRAVENOUS AS NEEDED
Status: DISCONTINUED | OUTPATIENT
Start: 2024-05-06 | End: 2024-05-06

## 2024-05-06 RX ORDER — CEFAZOLIN SODIUM 2 G/100ML
INJECTION, SOLUTION INTRAVENOUS AS NEEDED
Status: DISCONTINUED | OUTPATIENT
Start: 2024-05-06 | End: 2024-05-06

## 2024-05-06 RX ORDER — MIDAZOLAM HYDROCHLORIDE 1 MG/ML
INJECTION, SOLUTION INTRAMUSCULAR; INTRAVENOUS AS NEEDED
Status: DISCONTINUED | OUTPATIENT
Start: 2024-05-06 | End: 2024-05-06

## 2024-05-06 RX ORDER — HYDRALAZINE HYDROCHLORIDE 20 MG/ML
5 INJECTION INTRAMUSCULAR; INTRAVENOUS EVERY 30 MIN PRN
Status: DISCONTINUED | OUTPATIENT
Start: 2024-05-06 | End: 2024-05-06 | Stop reason: HOSPADM

## 2024-05-06 RX ORDER — LABETALOL HYDROCHLORIDE 5 MG/ML
5 INJECTION, SOLUTION INTRAVENOUS ONCE AS NEEDED
Status: DISCONTINUED | OUTPATIENT
Start: 2024-05-06 | End: 2024-05-06 | Stop reason: HOSPADM

## 2024-05-06 RX ORDER — SODIUM CHLORIDE, SODIUM LACTATE, POTASSIUM CHLORIDE, CALCIUM CHLORIDE 600; 310; 30; 20 MG/100ML; MG/100ML; MG/100ML; MG/100ML
INJECTION, SOLUTION INTRAVENOUS CONTINUOUS PRN
Status: DISCONTINUED | OUTPATIENT
Start: 2024-05-06 | End: 2024-05-06

## 2024-05-06 RX ORDER — ACETAMINOPHEN 325 MG/1
975 TABLET ORAL ONCE
Status: DISCONTINUED | OUTPATIENT
Start: 2024-05-06 | End: 2024-05-06 | Stop reason: HOSPADM

## 2024-05-06 RX ORDER — FENTANYL CITRATE 50 UG/ML
INJECTION, SOLUTION INTRAMUSCULAR; INTRAVENOUS AS NEEDED
Status: DISCONTINUED | OUTPATIENT
Start: 2024-05-06 | End: 2024-05-06

## 2024-05-06 RX ORDER — LIDOCAINE HYDROCHLORIDE 10 MG/ML
0.1 INJECTION INFILTRATION; PERINEURAL ONCE
Status: DISCONTINUED | OUTPATIENT
Start: 2024-05-06 | End: 2024-05-06 | Stop reason: HOSPADM

## 2024-05-06 RX ORDER — ONDANSETRON HYDROCHLORIDE 2 MG/ML
4 INJECTION, SOLUTION INTRAVENOUS ONCE AS NEEDED
Status: DISCONTINUED | OUTPATIENT
Start: 2024-05-06 | End: 2024-05-06 | Stop reason: HOSPADM

## 2024-05-06 RX ORDER — HYDROCODONE BITARTRATE AND ACETAMINOPHEN 5; 325 MG/1; MG/1
1 TABLET ORAL EVERY 6 HOURS PRN
Qty: 12 TABLET | Refills: 0 | Status: SHIPPED | OUTPATIENT
Start: 2024-05-06 | End: 2024-05-11

## 2024-05-06 RX ORDER — LIDOCAINE HYDROCHLORIDE 20 MG/ML
INJECTION, SOLUTION INFILTRATION; PERINEURAL AS NEEDED
Status: DISCONTINUED | OUTPATIENT
Start: 2024-05-06 | End: 2024-05-06

## 2024-05-06 RX ORDER — ASPIRIN 81 MG/1
81 TABLET ORAL 2 TIMES DAILY
Qty: 28 TABLET | Refills: 0 | Status: SHIPPED | OUTPATIENT
Start: 2024-05-06 | End: 2024-05-20

## 2024-05-06 RX ORDER — OXYCODONE HYDROCHLORIDE 5 MG/1
5 TABLET ORAL EVERY 4 HOURS PRN
Status: DISCONTINUED | OUTPATIENT
Start: 2024-05-06 | End: 2024-05-06 | Stop reason: HOSPADM

## 2024-05-06 RX ORDER — OXYCODONE AND ACETAMINOPHEN 5; 325 MG/1; MG/1
1 TABLET ORAL EVERY 4 HOURS PRN
Status: DISCONTINUED | OUTPATIENT
Start: 2024-05-06 | End: 2024-05-06 | Stop reason: HOSPADM

## 2024-05-06 RX ORDER — BUPIVACAINE HYDROCHLORIDE 5 MG/ML
INJECTION, SOLUTION PERINEURAL AS NEEDED
Status: DISCONTINUED | OUTPATIENT
Start: 2024-05-06 | End: 2024-05-06 | Stop reason: HOSPADM

## 2024-05-06 RX ORDER — SODIUM CHLORIDE, SODIUM LACTATE, POTASSIUM CHLORIDE, CALCIUM CHLORIDE 600; 310; 30; 20 MG/100ML; MG/100ML; MG/100ML; MG/100ML
100 INJECTION, SOLUTION INTRAVENOUS CONTINUOUS
Status: DISCONTINUED | OUTPATIENT
Start: 2024-05-06 | End: 2024-05-06 | Stop reason: HOSPADM

## 2024-05-06 RX ADMIN — ONDANSETRON 4 MG: 2 INJECTION INTRAMUSCULAR; INTRAVENOUS at 09:01

## 2024-05-06 RX ADMIN — LIDOCAINE HYDROCHLORIDE 60 MG: 20 INJECTION, SOLUTION INFILTRATION; PERINEURAL at 08:32

## 2024-05-06 RX ADMIN — CEFAZOLIN SODIUM 2 G: 2 INJECTION, SOLUTION INTRAVENOUS at 08:44

## 2024-05-06 RX ADMIN — FENTANYL CITRATE 50 MCG: 50 INJECTION, SOLUTION INTRAMUSCULAR; INTRAVENOUS at 08:32

## 2024-05-06 RX ADMIN — MIDAZOLAM 2 MG: 1 INJECTION INTRAMUSCULAR; INTRAVENOUS at 08:32

## 2024-05-06 RX ADMIN — DEXAMETHASONE SODIUM PHOSPHATE 4 MG: 4 INJECTION, SOLUTION INTRAMUSCULAR; INTRAVENOUS at 09:01

## 2024-05-06 RX ADMIN — OXYCODONE HYDROCHLORIDE 5 MG: 5 TABLET ORAL at 10:42

## 2024-05-06 RX ADMIN — SODIUM CHLORIDE, POTASSIUM CHLORIDE, SODIUM LACTATE AND CALCIUM CHLORIDE: 600; 310; 30; 20 INJECTION, SOLUTION INTRAVENOUS at 08:32

## 2024-05-06 RX ADMIN — PROPOFOL 200 MG: 10 INJECTION, EMULSION INTRAVENOUS at 08:32

## 2024-05-06 RX ADMIN — FENTANYL CITRATE 50 MCG: 50 INJECTION, SOLUTION INTRAMUSCULAR; INTRAVENOUS at 08:44

## 2024-05-06 SDOH — HEALTH STABILITY: MENTAL HEALTH: CURRENT SMOKER: 0

## 2024-05-06 ASSESSMENT — PAIN SCALES - GENERAL
PAINLEVEL_OUTOF10: 2
PAINLEVEL_OUTOF10: 2
PAINLEVEL_OUTOF10: 3
PAINLEVEL_OUTOF10: 2
PAINLEVEL_OUTOF10: 2
PAINLEVEL_OUTOF10: 4

## 2024-05-06 ASSESSMENT — PAIN - FUNCTIONAL ASSESSMENT
PAIN_FUNCTIONAL_ASSESSMENT: 0-10
PAIN_FUNCTIONAL_ASSESSMENT: UNABLE TO SELF-REPORT
PAIN_FUNCTIONAL_ASSESSMENT: 0-10

## 2024-05-06 ASSESSMENT — COLUMBIA-SUICIDE SEVERITY RATING SCALE - C-SSRS
1. IN THE PAST MONTH, HAVE YOU WISHED YOU WERE DEAD OR WISHED YOU COULD GO TO SLEEP AND NOT WAKE UP?: NO
2. HAVE YOU ACTUALLY HAD ANY THOUGHTS OF KILLING YOURSELF?: NO
6. HAVE YOU EVER DONE ANYTHING, STARTED TO DO ANYTHING, OR PREPARED TO DO ANYTHING TO END YOUR LIFE?: NO

## 2024-05-06 ASSESSMENT — PAIN DESCRIPTION - DESCRIPTORS: DESCRIPTORS: ACHING

## 2024-05-06 NOTE — ANESTHESIA PREPROCEDURE EVALUATION
Patient: Ebenezer Campbell    Procedure Information       Date/Time: 05/06/24 0830    Procedure: Meniscectomy Arthroscopy Knee (Right: Knee)    Location: [facility] OR 09 / [facility]    Surgeons: Roney Otero MD            Relevant Problems   Cardiac   (+) Atypical chest pain   (+) Coronary artery disease of native heart with stable angina pectoris (CMS-HCC)   (+) Dyslipidemia, goal LDL below 70   (+) Essential hypertension with goal blood pressure less than 130/85   (+) Peripheral vascular disease (CMS-HCC)   (+) Presence of drug coated stent in LAD coronary artery      Pulmonary   (+) Obstructive sleep apnea on CPAP      Neuro   (+) Sciatica of left side      GI   (+) Dysphagia   (+) Esophageal reflux   (+) Hiatal hernia      /Renal   (+) Benign enlargement of prostate   (+) Prostate cancer (Multi)      Musculoskeletal   (+) Osteoarthritis      ID   (+) Tinea corporis       Clinical information reviewed:   Tobacco  Allergies    Med Hx  Surg Hx   Fam Hx  Soc Hx        NPO Detail:  No data recorded     Physical Exam    Airway  Mallampati: II  TM distance: >3 FB  Neck ROM: full     Cardiovascular   Rhythm: regular  Rate: normal     Dental - normal exam     Pulmonary   Breath sounds clear to auscultation     Abdominal            Anesthesia Plan    History of general anesthesia?: yes  History of complications of general anesthesia?: no    ASA 3     general     The patient is not a current smoker.    intravenous induction   Postoperative administration of opioids is intended.  Anesthetic plan and risks discussed with patient.    Plan discussed with CRNA.

## 2024-05-06 NOTE — H&P
History and physical is reviewed, patient re evaluated, no changes.  Procedure, risks, benefits, and postoperative course were discussed with the patient and consent is reviewed.    Roney Otero MD

## 2024-05-06 NOTE — DISCHARGE INSTRUCTIONS
Post-Operative Instructions - Basic Knee Arthroscopy    Dr. Roney Otero    Activity / Swelling:  Okay to weightbear as tolerated immediately.  A brace is NOT needed. Crutches are used for balance and support but are only needed until patient feels safe ambulating.  ICE PACK to assist with pain control   Packs should not be in direct contact with your skin  Use fairly continuously until you remove the post-op dressing  After dressing removed, use for up to 20 minutes every hour as needed for pain and swelling     Diet:  Gradually resume your normal diet as tolerated following surgery.  The evening of your surgical day, begin with liquids and/or light foods.  If you are feeling well enough the next morning, progress to your normal eating patterns    Dressing care /Showering / Hygiene:  Keep operative dressing clean, dry, and intact.     Remove dressing on Post-Op Day 3    Leave the Steri-strips (small white tapes across the incisions) in place.  If no Steri-strips or they come off with dressing cover incisions with a regular / large band aid.  Do not apply lotions or ointments to incisions.  Observe the incision sites for increased redness, drainage, or foul odor.     Showering:  Once the dressing has been removed, you may shower. Incisions may be gently cleansed with mild soap. Do not scrub or rub incisions. No baths, hot-tubs, pools, or immersive soaking of any kind until sutures are removed and incisions are healed.      Medications:  Pain:  You will be given a prescription for pain medication after your surgery.  It should be taken as needed only and in many cases is NOT needed.  The goal is to discontinue it as quickly as possible.  DO NOT drive or operate heavy machinery while taking this medication as it will make you drowsy.  Do not take any additional pain medications.  This medication often continues Tylenol / acetaminophen and NO additional acetaminophen should be taken.      You may want to consider  also taking over-the-counter stool softener and/or laxative (Colace, Senekot or Dulcolax). These medications should be taken as directed and only short term.    In addition to pain medication recommend over the counter Ibuprofen 600 mg every 6-8 hours.  Take with food and avoid if any issues with stomach/GI or kidneys.  Can also add over the counter medicine (Pepcid/omeprazole) to help protect the stomach.  Do NOT take any additional anti-inflammatories.  Do not take the ibuprofen if prior bleeding issues or history of ulcers.     Prevention of Blood Clots:  81 mg of aspirin (over the counter) to start the day after surgery for 2 weeks.   Calf pumps should be done at rest.  If you have a history of blood clots you may receive a different prescription (for example: lovenox, xarelto, eliquis)     Do NOT take if prescribed other blood thinners which will be discussed prior.    Physical therapy:  Will be discussed at first follow-up appointment but can begin immediately if scheduled, if no PT is scheduled then home therapy can begin the day after surgery ~ 4 times a day for 20 min:  prop up the heel and working on gently pushing down on thigh to promote a STRAIGHT LEG.    Quad sets: with leg straight tighten quad muscles and hold for 5 seconds.  Bending can be limited by swelling but okay to bend the leg immediately and as much as tolerated.    Driving: once off narcotics, off crutches, and good leg control is achieved.  Will be discussed at first visit.    Follow-up:         Generally 10-14 days post-op      Call with any concerns, especially calf pain, signs of infection (fever, drainage) or shortness of breath.  [phone]

## 2024-05-06 NOTE — OP NOTE
Operative Note     Date: 2024  OR Location: [facility] OR    Name: Ebenezer Campbell, : 1945, Age: 78 y.o., MRN: [id number], Sex: male    Diagnosis  Pre-op Diagnosis     * Other tear of medial meniscus, current injury, right knee, initial encounter [S83.241A] Post-op Diagnosis     * Other tear of medial meniscus, current injury, right knee, initial encounter [S83.241A]  Chondromalacia      Procedures  Meniscectomy Arthroscopy Knee  86559 - [place] ARTHRS KNE SURG W/MENISCECTOMY MED/LAT W/SHVG  Medial meniscus, chondroplasty patella / medial femoral condyle right knee    Surgeons      * Roney Otero - Primary    Resident/Fellow/Other Assistant:  Surgeons and Role:     * Tae Raza PA-C - Assisting    Procedure Summary  Anesthesia: General  ASA: III  Anesthesia Staff: Anesthesiologist: Augustus Ramirez MD  CRNA: MARLENE Altman-CRNA  Estimated Blood Loss: 5 mL  Intra-op Medications:   Administrations occurring from 0830 to 0915 on 24:   Medication Name Total Dose   BUPivacaine HCl (Marcaine) 0.5 % (5 mg/mL) injection 20 mL              Anesthesia Record               Intraprocedure I/O Totals       None           Specimen: No specimens collected     Staff:   Circulator: Tatiana Salvador RN; Zeyad Carson RN           Implants:     Findings:   Operative findings: (Outerbridge classification 0-4)   Patella: 2  Trochlea: 1  Medial compartment: 2  Lateral compartment: 0    Indications:     The patient was seen in the preoperative area. The risks, benefits, complications, treatment options, non-operative alternatives, expected recovery and outcomes were discussed with the patient. The possibilities of reaction to medication, pulmonary aspiration, injury to surrounding structures, bleeding, recurrent infection, the need for additional procedures, failure to diagnose a condition, and creating a complication requiring transfusion or operation were discussed with the patient. The patient concurred with the  proposed plan, giving informed consent.  The site of surgery was properly noted/marked if necessary per policy. The patient has been actively warmed in preoperative area. Preoperative antibiotics have been ordered and given within 1 hours of incision. Venous thrombosis prophylaxis have been ordered.     Patient was noted to have internal derangement of the knee refractory to non-surgical modalities.  We discussed associated interventions and the risks of the surgery, including DVT/PE, infection, stiffness, medical complications, and incomplete relief of pre-operative symptoms.    Procedure Details:   The patient was met prior to surgery and the appropriate extremity was marked.  We reviewed recent health history and found no contraindication to proceeding with the planned procedure.  The patient was transported to the operating room and underwent general anesthesia.  The patient was positioned supine on the operative table with all orville prominences well-padded. A sequential compression device was placed on the contralateral leg.  A non-sterile thigh tourniquet was placed and a padded lateral thigh post.    The leg was prepped and draped in the usual sterile fashion.  A timeout was completed and antibiotic administration was in accordance with standard protocol.  The leg was exsanguinated using an Esmarch bandage and the tourniquet was inflated.  The superficial landmarks of the knee were palpated and anteromedial and anterolateral portals were created.    A diagnostic arthroscopy was performed utilizing a fluid pump with sterile saline.  The articular surface of the patella, trochlea, medial and lateral femoral condyles and tibial plateaus were evaluated.  The Outerbridge classifications are listed above.  The gutters were evaluated and no loose bodies were noted. The medial compartment was entered with valgus stress in a slightly flexed knee against the lateral post.  The assistant helped with this to facilitate  visualization.  The meniscus was probed superiorly and inferiorly using a blunt probe.  The notch was inspected and the ACL and PCL were healthy in appearance and had appropriate tension when probed.  The knee was then flexed into a figure of four position and the lateral compartment was entered.      The articular surface of the patella and medial femoral condyle had chondral wear and a component of delamination which we felt could contribute to mechanical symptoms.  A 3.5 mm aggressive plus shaver was used to debride the articular cartilage until stable margins were obtained.    The medial meniscus was noted to have a degenerative tear of the posterior horn.   Using an aggressive plus shaver and meniscal biters, we debrided the meniscus back until we obtained healthy and stable margins.  The meniscectomy extended about 60 % to the periphery in the affected area.    The medial compartment was fairly tight and the medial collateral ligament was lengthening using an 18 gauge spinal needle in an outside-in fashion. This was done in a controlled fashion and no instability was noted on physical examination after.    The knee was irrigated and lavaged to remove and loose meniscal or chondral debris.  A second pass was made diagnostically to confirm removal of any fragments and inspect for any additional pathology.  The residual fluid was expressed from the knee.  The portals were closed using a buried 3-0 monocryl suture.  Local anesthetic was injected into the soft tissue around the portals. Sterile bandages were placed.  The patient was stable to the recovery room.    I was present and participated in the entire procedure. The Physician Assistant participated in all critical elements of the procedure under my direct supervision. The surgical incision was closed by the PA under my indirect supervision. There were no qualified residents available to assist.    The physician assistant was present for the entire case.   Given the nature of the procedure and disease process, a skilled surgical assistant was necessary for the case.  The assistant was necessary for retraction and helped directly facilitate completion of the surgery.  A certified scrub tech was at the back table managing instruments and supplies for the surgical procedure.      Complications:  None; patient tolerated the procedure well.    Disposition: PACU - hemodynamically stable.  Condition: stable         Roney Otero  Phone Number: [phone]

## 2024-05-06 NOTE — ANESTHESIA POSTPROCEDURE EVALUATION
Patient: Ebenezer Campbell    Procedure Summary       Date: 05/06/24 Room / Location: [facility] OR 09 / [facility]    Anesthesia Start: 0832 Anesthesia Stop: 0914    Procedure: Meniscectomy Arthroscopy Knee (Right: Knee) Diagnosis:       Other tear of medial meniscus, current injury, right knee, initial encounter      (Other tear of medial meniscus, current injury, right knee, initial encounter [S83.869A])    Surgeons: Roney Otero MD Responsible Provider: Augustus Ramirez MD    Anesthesia Type: general ASA Status: 3            Anesthesia Type: general    Vitals Value Taken Time   /66 05/06/24 0930   Temp 36.2 °C (97.2 °F) 05/06/24 0910   Pulse 54 05/06/24 0934   Resp 17 05/06/24 0934   SpO2 97 % 05/06/24 0934   Vitals shown include unfiled device data.    Anesthesia Post Evaluation    Patient location during evaluation: PACU  Patient participation: complete - patient participated  Level of consciousness: awake and alert  Pain management: satisfactory to patient  Airway patency: patent  Cardiovascular status: acceptable  Respiratory status: acceptable  Hydration status: balanced  Postoperative Nausea and Vomiting: none        There were no known notable events for this encounter.

## 2024-05-06 NOTE — ANESTHESIA PROCEDURE NOTES
Airway  Date/Time: 5/6/2024 8:39 AM  Urgency: elective    Airway not difficult    Staffing  Performed: CRNA   Authorized by: ANTONIO Altman    Performed by: ANTONIO Altman  Patient location during procedure: OR    Indications and Patient Condition  Indications for airway management: anesthesia  Spontaneous ventilation: present  Sedation level: deep  Preoxygenated: yes  MILS maintained throughout  Mask difficulty assessment: 1 - vent by mask    Final Airway Details  Final airway type: supraglottic airway      Successful airway: unique  Size 4     Number of attempts at approach: 1  Ventilation between attempts: none  Number of other approaches attempted: 0

## 2024-05-22 ENCOUNTER — OFFICE VISIT (OUTPATIENT)
Dept: ORTHOPEDIC SURGERY | Facility: CLINIC | Age: 79
End: 2024-05-22
Payer: MEDICARE

## 2024-05-22 DIAGNOSIS — S83.241S ACUTE MEDIAL MENISCUS TEAR OF RIGHT KNEE, SEQUELA: ICD-10-CM

## 2024-05-22 NOTE — PROGRESS NOTES
History of Present Illness:  Patient returns today noting minimal pain.  Denies any calf pain or shortness of breath.  Overall patient is doing well having some minimal pain, would like to participate in some physical therapy.    Physical Examination:   Mild effusion  Healthy incisions - no active drainage  Good range of motion  No calf swelling  Negative Megan´s test  Distal neurovascular exam intact    Operative Findings:  Patella: 2  Trochlea: 1  Medial compartment: 2  Lateral compartment: 0    Assessment:  Patient status post right knee arthroscopy partial medial meniscectomy with chondroplasty of the patella and medial femoral condyle    Plan:  Reviewed arthroscopic photos and findings.  Discussed short and long term implications for the knee.  Discussed analgesics, ice, rest.  Encouraged home exercise program, physical therapy.     Brice Vyas PA-C      In a face to face encounter, I evaluated the patient and performed a physical examination, discussed pertinent diagnostic studies if indicated and discussed diagnosis and management strategies with both the patient and physician assistant / nurse practitioner.  I reviewed the PA/NP's note and agree with the documented findings and plan of care.        Roney Otero MD

## 2024-05-23 ENCOUNTER — TELEPHONE (OUTPATIENT)
Dept: RADIATION ONCOLOGY | Facility: HOSPITAL | Age: 79
End: 2024-05-23
Payer: MEDICARE

## 2024-05-23 DIAGNOSIS — E78.5 DYSLIPIDEMIA, GOAL LDL BELOW 70: ICD-10-CM

## 2024-05-23 RX ORDER — ATORVASTATIN CALCIUM 80 MG/1
TABLET, FILM COATED ORAL
Qty: 90 TABLET | Refills: 3 | Status: SHIPPED | OUTPATIENT
Start: 2024-05-23

## 2024-05-24 ENCOUNTER — EVALUATION (OUTPATIENT)
Dept: PHYSICAL THERAPY | Facility: CLINIC | Age: 79
End: 2024-05-24
Payer: MEDICARE

## 2024-05-24 DIAGNOSIS — S83.241S ACUTE MEDIAL MENISCUS TEAR OF RIGHT KNEE, SEQUELA: ICD-10-CM

## 2024-05-24 DIAGNOSIS — M62.81 MUSCLE WEAKNESS: ICD-10-CM

## 2024-05-24 DIAGNOSIS — M25.561 RIGHT KNEE PAIN: Primary | ICD-10-CM

## 2024-05-24 PROCEDURE — 97140 MANUAL THERAPY 1/> REGIONS: CPT | Mod: GP | Performed by: PHYSICAL THERAPIST

## 2024-05-24 PROCEDURE — 97110 THERAPEUTIC EXERCISES: CPT | Mod: GP | Performed by: PHYSICAL THERAPIST

## 2024-05-24 PROCEDURE — 97161 PT EVAL LOW COMPLEX 20 MIN: CPT | Mod: GP | Performed by: PHYSICAL THERAPIST

## 2024-05-24 ASSESSMENT — PAIN SCALES - GENERAL: PAINLEVEL_OUTOF10: 0 - NO PAIN

## 2024-05-24 ASSESSMENT — PAIN - FUNCTIONAL ASSESSMENT: PAIN_FUNCTIONAL_ASSESSMENT: 0-10

## 2024-05-24 NOTE — PROGRESS NOTES
Physical Therapy    Physical Therapy Evaluation and Treatment      Patient Name: Ebenezer Campbell  MRN: [id number]  Today's Date: 5/24/2024  Time Calculation  Start Time: 0826  Stop Time: 0910  Time Calculation (min): 44 min  Insurance:  [facility] Medicare Advantage  0% coins, no ded/oop, no copay, bmn jarrett yr, no PA  Visit 1 of 20 (re-assess at 10)      Assessment:    Pt is a 79yo male presenting with R knee pain, limited mobility and weakness, resulting in some limitation in function such as ambulation duration, stair amb and transfers. Skilled PT is recommended to address these issues with manual tissue and joint mobilization, stretching, and progressive strengthening of the LEs so that he can return to prior level of function without R knee limitation.     Plan:  OP PT Plan  Treatment/Interventions: Aquatic therapy, Dry needling, Education/ Instruction, Electrical stimulation, Gait training, Manual therapy, Neuromuscular re-education, Self care/ home management, Therapeutic activities, Therapeutic exercises, Vasopneumatic device  PT Plan: Skilled PT  PT Frequency:  (1-2x/week)  Duration: 20  Certification Period Start Date: 05/24/24  Certification Period End Date: 08/22/24  Rehab Potential: Excellent  Plan of Care Agreement: Patient    Current Problem:   1. Right knee pain  Follow Up In Physical Therapy      2. Acute medial meniscus tear of right knee, sequela  Referral to Physical Therapy      3. Muscle weakness            Subjective    General:   Pt woke up with pain in the R knee. Got surgery a few weeks later, which helped.   He currently c/o muscle pain above and below the patella, mild limp. Weakness in AM with first few steps. Restricted from golf for 6 more weeks.     Precautions:  Precautions  Precautions Comment: not a signif fall risk  Vital Signs:     Pain:  Pain Assessment  Pain Assessment: 0-10  Pain Score: 0 - No painCurrent: 0   Worst: 8   Location: R supra and infrapatellar area, sometimes the post  knee.   Home Living:     Current Level of Function:   walking charlee= 1/4 milex2, approx 10 min each way. (used to be able to do 4 miles, 1 hour)  standing charlee= he was WB ~4-5 hours, with intermittent infreq sitting. This was very painful  stairs: ascends recip 70% of the time. Descends recip 40% of the time.  supine to sit transfers: no pain or difficulty with this.   sit to stand transfers: uses UE assist, kym if he has been sitting for a long time.    Objective   ROM:  Supine knee AROM:   L= 0*-136*  R= 3*-132*    Strength R/L:  Hip flex: 26#/28#  Hip ext: 41#/37#  Hip abduct: 36#/35#  Hip adduct: 33# p!/ 38#  Quads: 41# p!/ 67#  Hams: 47#/48#    Flexibility:  90-90 Hamstring R/L: 65*/60*  Prone knee flexion (quad flexbility) R/L: 102*/100*    Neuro:  Unremarkable    Observation:  Did not appear to have signif swelling.     Gait:   Mild limp.    Outcome Measures:  Other Measures  Lower Extremity Funtional Score (LEFS): 77/80     Treatments:  Ther Ex 12163: 15/1  Gastroc, soleus, hamstring stretches at step, :30 1x each  SLR 10x  Bridges, 10x  Hip flexor/quad stretch off EOB, :30 1x each side.     Manual Ther Tech 92993: 8/1  Gr 2-3 med/lat and sup/inf R patellar glides.   STM/DTM to distal quad    EDUCATION:  Outpatient Education  Education Comment: Pt given HEP of all exercises performed today with modifications to accommodate progression.    Goals:  Goals to be achieved by discharge, approx 12 weeks.    Patient will verbalize pain (0-10) = 0/10 at rest and 2/10 at worst with activity.    Patient will correctly perform home exercise program to progress current functional status.     Increase quad flexibility: prone (or equivalent) knee flexion = 120* to decrease patellar/quad tendons during activities of compression like stair descent.     Increase quad strength to 65# to improve stability and control of the L knee.     Pt will ascend and descend stairs with recip gait with minimal UE support without signif  deviation to improve home navigation.      Pt will tolerate walking > 45 min without increase in pain/gait deviations to get closer to prior function of 60 min.

## 2024-05-28 ENCOUNTER — HOSPITAL ENCOUNTER (OUTPATIENT)
Dept: RADIATION ONCOLOGY | Facility: HOSPITAL | Age: 79
Setting detail: RADIATION/ONCOLOGY SERIES
Discharge: HOME | End: 2024-05-28
Payer: MEDICARE

## 2024-05-28 VITALS
DIASTOLIC BLOOD PRESSURE: 74 MMHG | RESPIRATION RATE: 18 BRPM | BODY MASS INDEX: 31.94 KG/M2 | HEART RATE: 73 BPM | WEIGHT: 216.27 LBS | TEMPERATURE: 96.8 F | OXYGEN SATURATION: 95 % | SYSTOLIC BLOOD PRESSURE: 124 MMHG

## 2024-05-28 DIAGNOSIS — C61 PROSTATE CANCER (MULTI): Primary | ICD-10-CM

## 2024-05-28 PROCEDURE — 99215 OFFICE O/P EST HI 40 MIN: CPT

## 2024-05-28 ASSESSMENT — ENCOUNTER SYMPTOMS
BLOOD IN STOOL: 0
PALPITATIONS: 0
OCCASIONAL FEELINGS OF UNSTEADINESS: 0
DIZZINESS: 0
PSYCHIATRIC NEGATIVE: 1
JOINT SWELLING: 0
WEAKNESS: 0
HEMATURIA: 0
COUGH: 0
FATIGUE: 0
ANAL BLEEDING: 0
DEPRESSION: 0
DIFFICULTY URINATING: 0
ALLERGIC/IMMUNOLOGIC NEGATIVE: 1
ABDOMINAL PAIN: 0
DIARRHEA: 0
CHEST TIGHTNESS: 0
DYSURIA: 0
FEVER: 0
RECTAL PAIN: 0
CONSTIPATION: 0
LOSS OF SENSATION IN FEET: 0
SHORTNESS OF BREATH: 0
BACK PAIN: 0
FREQUENCY: 0
ARTHRALGIAS: 0
UNEXPECTED WEIGHT CHANGE: 0

## 2024-05-28 ASSESSMENT — PAIN SCALES - GENERAL: PAINLEVEL: 0-NO PAIN

## 2024-05-28 NOTE — PROGRESS NOTES
"Cancer synopsis:  Rad/onc: Dr. Malloy/ Karen CNP      78 M initially presented with elevated PSA, transrectal US-guided prostate biopsy showed GS 4+3 disease in 3 samples, 9/12 cores positive. Enrolled in [place]  010 assigned  to daro and adt 6m w/ SBRT.     PSA:  2/15/23 5.8  1/16/23 6.8  7/8/22 4.5     He had a prostate MRI and PSMA PET/CT showing no evidence of metastatic disease.     Decipher score 0.63     05/25/2023: Started ADT and daro 6m course     SBRT to prostate and SV 06/23/2023    Recent imaging:  MR of knee 04/2024: Meniscus tear and sprain  Ct chest wo 02/2024 dilated thoracic aorta     History of presenting illness:    Patient ID: [id number]     Ebenezer Campbell is a 78 y.o. male who presents for his intermediate risk prostate cancer GS 4+3=7, now s/p SBRT to prostate and enrolled in [facility] .    RT Site: Prostate and SV  RT Date: 06/23/2023  Hormone therapy: Yes, completed 11/2023 with 6m of ADT and daro  Hot Flushes: Denies  Fatigue: Denies  Bone pain: Denies  ED: Admits to loss of sexual function since starting systemic therapy. Has used Viagra with variable response. Admits orgasms are now \"dry\" and states orgasm sensation has reduced.  - Quality of erections during the last 4 weeks: 2 = Not firm enough for any sexual activity  - Use of erectile dysfunction medications:  Viagra (has cialis rx but not fill or used)  IPSS: 5  Urinary symptoms: Denies dysuria, hematuria. Denies weak stream or incomplete bladder emptying. Does report that with coffee intake increased urgency mild leakage of urine. Denies urgency outside of this. Denies nocturia  Urinary Medications: Yes, flowmax daily  Rectal bleeding: Denies  Colonoscopy: 12/2019, multiple polyps removed, diverticulosis noted, Next due now  Other systems: Denies SOB, CP or fever. Recent had r knee meniscus repair, recovering well.      Review of systems:  Review of Systems   Constitutional:  Negative for fatigue, fever and unexpected weight " change.   Respiratory:  Negative for cough, chest tightness and shortness of breath.    Cardiovascular:  Negative for chest pain, palpitations and leg swelling.   Gastrointestinal:  Negative for abdominal pain, anal bleeding, blood in stool, constipation, diarrhea and rectal pain.   Endocrine: Negative for cold intolerance, heat intolerance and polyuria.   Genitourinary:  Negative for decreased urine volume, difficulty urinating, dysuria, frequency, hematuria and urgency.   Musculoskeletal:  Negative for arthralgias, back pain, gait problem and joint swelling.   Skin: Negative.    Allergic/Immunologic: Negative.    Neurological:  Negative for dizziness, syncope and weakness.   Psychiatric/Behavioral: Negative.         Past Medical history  Past Medical History:   Diagnosis Date    Contact with and (suspected) exposure to other viral communicable diseases 12/30/2014    Exposure to influenza    Dysphagia, unspecified 04/16/2013    Difficulty swallowing    Early cataracts, bilateral 04/18/2023    Impacted cerumen, right ear 02/16/2017    Impacted cerumen of right ear    Obstructive sleep apnea (adult) (pediatric) 10/28/2015    Obstructive sleep apnea on CPAP    Other conditions influencing health status     Nephrolithiasis    Other enthesopathies, not elsewhere classified     Tendonitis of shoulder    Other fatigue     Tired    Other fracture of first lumbar vertebra, initial encounter for closed fracture (Multi)     Closed fracture of L1 vertebra    Personal history of other diseases of the circulatory system 10/01/2015    History of hypertension    Personal history of other diseases of the musculoskeletal system and connective tissue 12/16/2014    History of low back pain    Personal history of other diseases of the nervous system and sense organs 02/06/2015    History of sleep apnea    Personal history of other diseases of the respiratory system     History of allergic rhinitis    Personal history of other diseases  of urinary system 10/28/2015    History of hematuria    Personal history of other specified conditions     History of edema    Residual hemorrhoidal skin tags 2014    External hemorrhoids    Snoring 2014    Snoring    Sprain of joints and ligaments of unspecified parts of neck, initial encounter 2014    Neck sprain    Unspecified visual loss     Vision problem    Vitamin D deficiency, unspecified     Vitamin D deficiency        Surgical/family history  Family History   Problem Relation Name Age of Onset    Other (natural causes) Mother           at 93    Aortic aneurysm Father           at 87,  natural causes    Dementia Father          AAA repair in his 60s    No Known Problems Sister      Diabetes Brother González          at 76 - arrested at home w/ chest pain    Prostate cancer Brother González         IDDM since his 20s    Heart disease Brother González     Heart attack Brother Bill     Other (heart stents) Brother Bill     Allergies Brother Bill     Other (cardiac stents) Brother Stephen     Parkinsonism Brother andrei     Kidney cancer Brother andrei     Prostate cancer Brother Abdoul     Other (elevated psa) Brother Aguilar     No Known Problems Daughter      No Known Problems Daughter      No Known Problems Daughter      Alopecia Son Maximilian     Cystic fibrosis Son Esteban         no CF symptoms    Coronary artery disease Other Family History       Past Surgical History:   Procedure Laterality Date    COLONOSCOPY  2012    Complete Colonoscopy    ESOPHAGOGASTRODUODENOSCOPY  10/28/2016    Esophagogastroduodenoscopy With Biopsy    KNEE ARTHROSCOPY W/ DEBRIDEMENT  2014    Arthroscopy Knee Left    OTHER SURGICAL HISTORY  2020    Cataract surgery    OTHER SURGICAL HISTORY  2022    Esophagogastroduodenoscopy    OTHER SURGICAL HISTORY  2021    Cardiac catheterization with stent placement    UMBILICAL HERNIA REPAIR  2012    Umbilical Hernia Repair        Social History  Tobacco  Use: Low Risk  (5/22/2024)    Patient History     Smoking Tobacco Use: Never     Smokeless Tobacco Use: Never     Passive Exposure: Not on file         Current med list:  Current Outpatient Medications   Medication Instructions    atorvastatin (Lipitor) 80 mg tablet TAKE 1 TABLET BY MOUTH EVERY DAY FOR HIGH CHOLESTEROL    cetirizine (ZYRTEC) 10 mg, oral, Daily PRN    cholecalciferol (Vitamin D-3) 50 mcg (2,000 unit) capsule 1 capsule, oral, Daily    fluticasone (Flonase) 50 mcg/actuation nasal spray 1 spray, nasal, 2 times daily    losartan (COZAAR) 50 mg, oral, Daily    MULTIVITAMIN ORAL 1 capsule, oral, Daily    omeprazole (PRILOSEC) 40 mg, oral, Daily before breakfast    tadalafil (Cialis) 10 mg tablet 1/2 or 1 tablet daily as needed for erectile dysfunction    tamsulosin (FLOMAX) 0.4 mg, oral, Daily      Last recorded vital:  /74   Pulse 73   Temp 36 °C (96.8 °F)   Resp 18   Wt 98.1 kg (216 lb 4.3 oz)   SpO2 95%   BMI 31.94 kg/m²     Physical exam  Physical Exam  Constitutional:       Appearance: Normal appearance.   Cardiovascular:      Rate and Rhythm: Normal rate.   Pulmonary:      Effort: Pulmonary effort is normal.      Breath sounds: Normal breath sounds.   Musculoskeletal:         General: Normal range of motion.      Cervical back: Normal range of motion.   Neurological:      Mental Status: He is alert and oriented to person, place, and time.   Psychiatric:         Mood and Affect: Mood normal.         Behavior: Behavior normal.         Thought Content: Thought content normal.         Judgment: Judgment normal.         Pertinent labs:  Prostate Specific AG   Date/Time Value Ref Range Status   11/15/2023 08:06 AM <0.01 <=4.00 ng/mL Final     PSA   Date/Time Value Ref Range Status   05/01/2024 07:55 AM 0.2 0.0 - 4.0 ng/mL Final     Comment:     INTERPRETIVE INFORMATION: Prostate Specific Antigen    The Roche PSA electrochemiluminescent immunoassay is used. Results   obtained with different test  methods or kits cannot be used   interchangeably. The Roche PSA method is approved for use as an   aid in the detection of prostate cancer when used in conjunction   with a digital rectal exam in individuals with a prostate age 50   years and older. The Roche PSA is also indicated for the serial   measurement of PSA to aid in the prognosis and management of   prostate cancer patients. Elevated PSA concentrations can only   suggest the presence of prostate cancer until biopsy is performed.   PSA concentrations can also be elevated in benign prostatic   hyperplasia or inflammatory conditions of the prostate. PSA is   generally not elevated in healthy individuals or individuals with   nonprostatic carcinoma.         Dx:  Problem List Items Addressed This Visit       Prostate cancer (Multi) - Primary    Relevant Orders    Clinic Appointment Request Follow Up; TOM[facility]S CERVANTES; [facility] [address] [facility]    Clinic Appointment Request Follow Up; TOM[facility]S CERVANT[facility]; [facility] [address] [facility] (Completed)    PSA, Total and Free    Testosterone    Comprehensive Metabolic Panel    CBC and Auto Differential     PSA of 0.2 was reviewed and is undectable. Testerone is currently 278 and is rising nicely after completing hormonal therapy. CBC stable, mild anemia likely r/t chronic disease.  Review of latent SE including rectal bleeding, hematuria, urinary strictures, ED where reviewed as well as how to contact office if s/s present. Denies latent SE. Does report some ongoing ED however had issues prior. Likely current ED loss is caused by low Testerone. NCCN guidelines where reviewed and routine FUV of every 3m for first year and every 6m for four years for a total of five years was discussed. Patient verbalized understanding.      Reviewed Viagra 100mg will continue    PLAN:  FUV 3m per [facility]  010  Labs CBC, CMP, PSA and testerone  Imaging none  Flowmax daily  Viagra 100mg  FUV other providers: PCP for routine evals    Please contact office with  any concerns:  Yan Salcedo CNP  [phone]

## 2024-05-31 ENCOUNTER — TREATMENT (OUTPATIENT)
Dept: PHYSICAL THERAPY | Facility: CLINIC | Age: 79
End: 2024-05-31
Payer: MEDICARE

## 2024-05-31 DIAGNOSIS — M62.81 MUSCLE WEAKNESS: Primary | ICD-10-CM

## 2024-05-31 DIAGNOSIS — M25.561 RIGHT KNEE PAIN: ICD-10-CM

## 2024-05-31 PROCEDURE — 97110 THERAPEUTIC EXERCISES: CPT | Mod: GP | Performed by: PHYSICAL THERAPIST

## 2024-05-31 PROCEDURE — 97140 MANUAL THERAPY 1/> REGIONS: CPT | Mod: GP | Performed by: PHYSICAL THERAPIST

## 2024-05-31 ASSESSMENT — PAIN - FUNCTIONAL ASSESSMENT: PAIN_FUNCTIONAL_ASSESSMENT: 0-10

## 2024-05-31 ASSESSMENT — PAIN SCALES - GENERAL: PAINLEVEL_OUTOF10: 0 - NO PAIN

## 2024-05-31 NOTE — PROGRESS NOTES
Physical Therapy    Physical Therapy Treatment    Patient Name: Ebenezer Campbell  MRN: [id number]  Insurance:  [facility] Medicare Advantage  0% coins, no ded/oop, no copay, bmn jarrett yr, no PA  Visit 2 of 20 (re-assess at 10)  Today's Date: 6/5/2024  Time in 738  Time out 832  Total time 54 min        PT Therapeutic Procedures Time Entry  Manual Therapy Time Entry: 10  Therapeutic Exercise Time Entry: 38                 Current Problem  1. Muscle weakness        2. Right knee pain  Follow Up In Physical Therapy            Subjective    General   Pt notes that he is doing well to start today, but reports that after mowing his lawn for 1.5 hours he has a lot of pain the next day, 6/10 that requires him to take Tylenol. Prolonged standing is also painful.     Precautions  Precautions  Precautions Comment: no falls since last visit  Vital Signs     Pain  Pain Assessment  Pain Assessment: 0-10  Pain Score: 0 - No pain    Objective   ROM:     Treatments:  Ther Ex 08339: 38/3  NuStep, L2 5 min.  Gastroc, soleus, hamstring stretches at step, :30 1x each  Prone quad stretch :30 2x  SLR 10x  Bridges, 20x  Hip flexor/quad stretch off EOB, :30 1x each side.   Tband hip x4, red, 10x each      Manual Ther Tech 33720: 10/1  Gr 2-3 med/lat and sup/inf R patellar glides.   Mio roller massage to distal quad    OP EDUCATION:   Tband hip x4 HEP and red band given for home use.     Assessment:   Pt had excellent recall of HEP upon review, showing good compliance. He was able to tolerate progressions without inc pain. He was well-challenged with Tband hip x4, and needed cues for form and light UE assist at times to maintain balance. He was given Tband and HEP to practice this at home.    Plan:  Cont to progress as charlee with hip and knee stability.     Goals:

## 2024-06-03 ENCOUNTER — TREATMENT (OUTPATIENT)
Dept: PHYSICAL THERAPY | Facility: CLINIC | Age: 79
End: 2024-06-03
Payer: MEDICARE

## 2024-06-03 DIAGNOSIS — M25.561 RIGHT KNEE PAIN: ICD-10-CM

## 2024-06-03 DIAGNOSIS — M62.81 MUSCLE WEAKNESS: Primary | ICD-10-CM

## 2024-06-03 PROCEDURE — 97140 MANUAL THERAPY 1/> REGIONS: CPT | Mod: GP | Performed by: PHYSICAL THERAPIST

## 2024-06-03 PROCEDURE — 97110 THERAPEUTIC EXERCISES: CPT | Mod: GP | Performed by: PHYSICAL THERAPIST

## 2024-06-03 ASSESSMENT — PAIN - FUNCTIONAL ASSESSMENT: PAIN_FUNCTIONAL_ASSESSMENT: 0-10

## 2024-06-03 ASSESSMENT — PAIN SCALES - GENERAL: PAINLEVEL_OUTOF10: 0 - NO PAIN

## 2024-06-03 NOTE — PROGRESS NOTES
Physical Therapy    Physical Therapy Treatment    Patient Name: Ebenezer Campbell  MRN: [id number]  Insurance:  [facility] Medicare Advantage  0% coins, no ded/oop, no copay, bmn jarrett yr, no PA  Visit 2 of 20 (re-assess at 10)  Today's Date: 6/3/2024  Time Calculation  Start Time: 0807  Stop Time: 0850  Time Calculation (min): 43 min     PT Therapeutic Procedures Time Entry  Manual Therapy Time Entry: 10  Therapeutic Exercise Time Entry: 25  Therapeutic Activity Time Entry: 3                 Current Problem  1. Muscle weakness        2. Right knee pain  Follow Up In Physical Therapy            Subjective    General   Pt states that he hiked on pavement 2.5 miles with hills over the weekend, with soreness at the end of the hike, no pain the day after.      Precautions  Precautions  Precautions Comment: no falls since last visit  Vital Signs     Pain  Pain Assessment  Pain Assessment: 0-10  Pain Score: 0 - No pain    Objective   ROM:   Prone quad stretch knee flexion on R= 127*    Treatments:  Ther Ex 15285: 25/2  NuStep, L2 5 min.  Gastroc, soleus, hamstring stretches at step, :30 1x each  Hip flexor/quad stretch off EOB, :30 1x each side.  Prone quad stretch :30 2x  Bridges with Tband, green, 20x    Not performed today:  SLR 10x  Tband hip x4, red, 10x each      Manual Ther Tech 89236: 10/1  Gr 2-3 med/lat and sup/inf R patellar glides.   Mio roller massage to distal quad  DTM to mid and distal quad    Ther Act 81593:- 3/0  Leg Press with ran KANG, 105# 2x10    OP EDUCATION:     Assessment:  Pt did well overall with program, though did have lateral joint line pain in mid range with leg press when the weight was sufficiently challenging. This pain did not resolve when patella was mobilized lateral to medially. Add'l deep hip rotator strengthening may assist with maintaining more efficient mechanics.    Plan:  Cont to progress as charlee with hip and knee stability.     Goals:

## 2024-06-05 ASSESSMENT — PAIN SCALES - GENERAL: PAINLEVEL_OUTOF10: 0 - NO PAIN

## 2024-06-05 ASSESSMENT — PAIN - FUNCTIONAL ASSESSMENT: PAIN_FUNCTIONAL_ASSESSMENT: 0-10

## 2024-06-10 ENCOUNTER — APPOINTMENT (OUTPATIENT)
Dept: PHYSICAL THERAPY | Facility: CLINIC | Age: 79
End: 2024-06-10
Payer: COMMERCIAL

## 2024-06-13 ENCOUNTER — APPOINTMENT (OUTPATIENT)
Dept: SLEEP MEDICINE | Facility: CLINIC | Age: 79
End: 2024-06-13
Payer: MEDICARE

## 2024-06-13 VITALS
HEART RATE: 66 BPM | BODY MASS INDEX: 31.9 KG/M2 | WEIGHT: 215.4 LBS | DIASTOLIC BLOOD PRESSURE: 76 MMHG | SYSTOLIC BLOOD PRESSURE: 116 MMHG | HEIGHT: 69 IN | RESPIRATION RATE: 18 BRPM | OXYGEN SATURATION: 95 %

## 2024-06-13 DIAGNOSIS — G47.33 OBSTRUCTIVE SLEEP APNEA ON CPAP: Chronic | ICD-10-CM

## 2024-06-13 DIAGNOSIS — G47.33 OSA (OBSTRUCTIVE SLEEP APNEA): Primary | ICD-10-CM

## 2024-06-13 ASSESSMENT — SLEEP AND FATIGUE QUESTIONNAIRES
DIFFICULTY_STAYING_ASLEEP: VERY SEVERE
DIFFICULTY_FALLING_ASLEEP: VERY SEVERE
SLEEP_PROBLEM_INTERFERES_DAILY_ACTIVITIES: SOMEWHAT
HOW LIKELY ARE YOU TO NOD OFF OR FALL ASLEEP WHILE SITTING QUIETLY AFTER LUNCH WITHOUT ALCOHOL: WOULD NEVER DOZE
HOW LIKELY ARE YOU TO NOD OFF OR FALL ASLEEP WHILE WATCHING TV: SLIGHT CHANCE OF DOZING
HOW LIKELY ARE YOU TO NOD OFF OR FALL ASLEEP WHILE SITTING AND READING: WOULD NEVER DOZE
SITING INACTIVE IN A PUBLIC PLACE LIKE A CLASS ROOM OR A MOVIE THEATER: SLIGHT CHANCE OF DOZING
WORRIED_DISTRESSED_DUE_TO_SLEEP: SOMEWHAT
HOW LIKELY ARE YOU TO NOD OFF OR FALL ASLEEP WHILE LYING DOWN TO REST IN THE AFTERNOON WHEN CIRCUMSTANCES PERMIT: SLIGHT CHANCE OF DOZING
HOW LIKELY ARE YOU TO NOD OFF OR FALL ASLEEP IN A CAR, WHILE STOPPED FOR A FEW MINUTES IN TRAFFIC: WOULD NEVER DOZE
HOW LIKELY ARE YOU TO NOD OFF OR FALL ASLEEP WHILE SITTING AND TALKING TO SOMEONE: WOULD NEVER DOZE
SATISFACTION_WITH_CURRENT_SLEEP_PATTERN: SATISFIED
WAKING_TOO_EARLY: VERY SEVERE
ESS-CHAD TOTAL SCORE: 3
HOW LIKELY ARE YOU TO NOD OFF OR FALL ASLEEP WHEN YOU ARE A PASSENGER IN A CAR FOR AN HOUR WITHOUT A BREAK: WOULD NEVER DOZE
SLEEP_PROBLEM_NOTICEABLE_TO_OTHERS: VERY MUCH NOTICEABLE

## 2024-06-13 ASSESSMENT — COLUMBIA-SUICIDE SEVERITY RATING SCALE - C-SSRS
6. HAVE YOU EVER DONE ANYTHING, STARTED TO DO ANYTHING, OR PREPARED TO DO ANYTHING TO END YOUR LIFE?: NO
1. IN THE PAST MONTH, HAVE YOU WISHED YOU WERE DEAD OR WISHED YOU COULD GO TO SLEEP AND NOT WAKE UP?: NO
2. HAVE YOU ACTUALLY HAD ANY THOUGHTS OF KILLING YOURSELF?: NO

## 2024-06-13 ASSESSMENT — ENCOUNTER SYMPTOMS
OCCASIONAL FEELINGS OF UNSTEADINESS: 0
LOSS OF SENSATION IN FEET: 0

## 2024-06-13 NOTE — PROGRESS NOTES
Patient: Ebenezer Campbell    [id number]  : 1945 -- AGE 78 y.o.    Provider: Daren Hernández DO     [facility]   Service Date: 2024              [facility]  New Visit Note        HISTORY OF PRESENT ILLNESS     The patient's referring provider is: Oral Castellano MD    HISTORY OF PRESENT ILLNESS   Ebenezer Campbell is a 78 y.o. male who presents to a [facility] for a sleep medicine evaluation with concerns of Referral (Referral from primary. Diagnosed about 10 years ago stop using cpap machine for about 2 years now. ).     The patient  has a past medical history of Contact with and (suspected) exposure to other viral communicable diseases (2014), Dysphagia, unspecified (2013), Early cataracts, bilateral (2023), Impacted cerumen, right ear (2017), Obstructive sleep apnea (adult) (pediatric) (10/28/2015), Other conditions influencing health status, Other enthesopathies, not elsewhere classified, Other fatigue, Other fracture of first lumbar vertebra, initial encounter for closed fracture (Multi), Personal history of other diseases of the circulatory system (10/01/2015), Personal history of other diseases of the musculoskeletal system and connective tissue (2014), Personal history of other diseases of the nervous system and sense organs (2015), Personal history of other diseases of the respiratory system, Personal history of other diseases of urinary system (10/28/2015), Personal history of other specified conditions, Residual hemorrhoidal skin tags (2014), Snoring (2014), Sprain of joints and ligaments of unspecified parts of neck, initial encounter (2014), Unspecified visual loss, and Vitamin D deficiency, unspecified..    PAST SLEEP HISTORY    Patient had a sleep study done due to snoring and night time awakenings. He was started on pap therapy but could not  tolerate it for more than a few weeks. His most recent sleep study was in 2021; he received a new machine but felt that the pressure was too high when the ramp ended. He also struggled with a mask air leak.      CURRENT HISTORY    On today's visit, 6/13/2024, the patient reports that he is establishing care for his sleep apnea. He would like to restart pap therapy.     Sleep schedule  on weekdays / work days:  Usual Bedtime: 10pm  Sleep latency: 1-2hrs, stays in bed.   Wake time : 5-7am  Total sleep time average/day: 4 hours/day  Awakenings: 6x per night, unknown/ nocturia, variable length.   Naps: dozes sometimes while watching TV.       Sleep schedule  on weekends/non work days :  Same as above.     Sleep aids: tylenol pm, sometimes it helps him fall asleep faster and stay asleep.   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     Preferred sleeping position: SLEEP POSITION: sidelying    Sleep-related ROS:    Snoring:  y  Witnessed apneas: n       Gasping/ choking: n       Am Dry mouth:    y         Nasal congestion: sometimes, zyrtec.        am headaches: n    Sleep is described as unrefreshing.     Daytime sleepiness: y  Fatigue or decreased energy: y  Difficulty remembering things in daytime: sometimes  Difficulty staying focused in daytime: n  Irritable during the day: n    Drowsy driving: n  Hx of car accident: n  Near-miss Car accident: n      RLS screen:  RLSSCREEN: - Sensations: Patient does not have unusual sensations in their extremities that cause an urge to move them     Sleep-related behaviors: DENIES    ESS: 3       REVIEW OF SYSTEMS     REVIEW OF SYSTEMS  Review of Systems   All other systems reviewed and are negative.      ALLERGIES AND MEDICATIONS     ALLERGIES  No Known Allergies    MEDICATIONS  Current Outpatient Medications   Medication Sig Dispense Refill    atorvastatin (Lipitor) 80 mg tablet TAKE 1 TABLET BY MOUTH EVERY DAY FOR HIGH  CHOLESTEROL 90 tablet 3    cetirizine (ZyrTEC) 5 mg tablet Take 2 tablets (10 mg) by mouth once daily as needed for allergies.      cholecalciferol (Vitamin D-3) 50 mcg (2,000 unit) capsule Take 1 capsule (50 mcg) by mouth once daily.      fluticasone (Flonase) 50 mcg/actuation nasal spray Administer 1 spray into affected nostril(s) 2 times a day.      losartan (Cozaar) 50 mg tablet Take 1 tablet (50 mg) by mouth once daily. 90 tablet 3    MULTIVITAMIN ORAL Take 1 capsule by mouth once daily.      omeprazole (PriLOSEC) 40 mg DR capsule Take 1 capsule (40 mg) by mouth once daily in the morning. Take before meals. 90 capsule 3    tamsulosin (Flomax) 0.4 mg 24 hr capsule Take 1 capsule (0.4 mg) by mouth once daily.      tadalafil (Cialis) 10 mg tablet 1/2 or 1 tablet daily as needed for erectile dysfunction (Patient not taking: Reported on 4/26/2024) 12 tablet 3     No current facility-administered medications for this visit.         PAST HISTORY     PAST MEDICAL HISTORY  He  has a past medical history of Contact with and (suspected) exposure to other viral communicable diseases (12/30/2014), Dysphagia, unspecified (04/16/2013), Early cataracts, bilateral (04/18/2023), Impacted cerumen, right ear (02/16/2017), Obstructive sleep apnea (adult) (pediatric) (10/28/2015), Other conditions influencing health status, Other enthesopathies, not elsewhere classified, Other fatigue, Other fracture of first lumbar vertebra, initial encounter for closed fracture (Multi), Personal history of other diseases of the circulatory system (10/01/2015), Personal history of other diseases of the musculoskeletal system and connective tissue (12/16/2014), Personal history of other diseases of the nervous system and sense organs (02/06/2015), Personal history of other diseases of the respiratory system, Personal history of other diseases of urinary system (10/28/2015), Personal history of other specified conditions, Residual hemorrhoidal skin  tags (2014), Snoring (2014), Sprain of joints and ligaments of unspecified parts of neck, initial encounter (2014), Unspecified visual loss, and Vitamin D deficiency, unspecified.      PAST SURGICAL HISTORY:  Past Surgical History:   Procedure Laterality Date    COLONOSCOPY  2012    Complete Colonoscopy    ESOPHAGOGASTRODUODENOSCOPY  10/28/2016    Esophagogastroduodenoscopy With Biopsy    KNEE ARTHROSCOPY W/ DEBRIDEMENT  2014    Arthroscopy Knee Left    OTHER SURGICAL HISTORY  2020    Cataract surgery    OTHER SURGICAL HISTORY  2022    Esophagogastroduodenoscopy    OTHER SURGICAL HISTORY  2021    Cardiac catheterization with stent placement    UMBILICAL HERNIA REPAIR  2012    Umbilical Hernia Repair       FAMILY HISTORY  Family History   Problem Relation Name Age of Onset    Other (natural causes) Mother           at 93    Aortic aneurysm Father           at 87,  natural causes    Dementia Father          AAA repair in his 60s    No Known Problems Sister      Diabetes Brother González          at 76 - arrested at home w/ chest pain    Prostate cancer Brother González         IDDM since his 20s    Heart disease Brother González     Heart attack Brother Bill     Other (heart stents) Brother Bill     Allergies Brother Bill     Other (cardiac stents) Brother Stephen     Parkinsonism Brother andrei     Kidney cancer Brother andrei     Prostate cancer Brother Abdoul     Other (elevated psa) Brother Aguilar     No Known Problems Daughter      No Known Problems Daughter      No Known Problems Daughter      Alopecia Son Maximilian     Cystic fibrosis Son Esteban         no CF symptoms    Coronary artery disease Other Family History      DOES/DOES NOT EC: does not have a family history of sleep disorder.      SOCIAL HISTORY  He  reports that he has never smoked. He has never used smokeless tobacco. He reports current alcohol use. He reports that he does not use drugs.     Caffeine  "consumption: 2-3 cups coffee in am.         PHYSICAL EXAM     VITAL SIGNS: /76   Pulse 66   Resp 18   Ht 1.753 m (5' 9\") Comment: neck circumfrance 16  Wt 97.7 kg (215 lb 6.4 oz)   SpO2 95%   BMI 31.81 kg/m²      PREVIOUS WEIGHTS:  Wt Readings from Last 3 Encounters:   06/13/24 97.7 kg (215 lb 6.4 oz)   05/28/24 98.1 kg (216 lb 4.3 oz)   05/06/24 96.2 kg (212 lb)       Physical Exam  Constitutional: Alert and oriented, cooperative, no obvious distress.   HENT: normocephalic.   Eyes: PERRLA, nonicteric   Neck: Supple, trachea midline   respiratory: CTA bilaterally, no wheezing/ crackles/ cough  Cardiac: no rub/ gallops  GI:BS in all 4 quadrants, Soft, nontender, no masses  musculoskeletal/ Extremities: No clubbing  integumentary: no significant rashes observed.   Neurologic: AOx3.   psychiatric: appropriate mood and affect.  Modified Mallampati: 4      RESULTS/DATA     Iron (ug/dL)   Date Value   11/15/2023 101     % Saturation (%)   Date Value   11/15/2023 32     TIBC (ug/dL)   Date Value   11/15/2023 317               ASSESSMENT/PLAN     Mr. Campbell is a 78 y.o. male and  has a past medical history of Contact with and (suspected) exposure to other viral communicable diseases (12/30/2014), Dysphagia, unspecified (04/16/2013), Early cataracts, bilateral (04/18/2023), Impacted cerumen, right ear (02/16/2017), Obstructive sleep apnea (adult) (pediatric) (10/28/2015), Other conditions influencing health status, Other enthesopathies, not elsewhere classified, Other fatigue, Other fracture of first lumbar vertebra, initial encounter for closed fracture (Multi), Personal history of other diseases of the circulatory system (10/01/2015), Personal history of other diseases of the musculoskeletal system and connective tissue (12/16/2014), Personal history of other diseases of the nervous system and sense organs (02/06/2015), Personal history of other diseases of the respiratory system, Personal history of other " diseases of urinary system (10/28/2015), Personal history of other specified conditions, Residual hemorrhoidal skin tags (04/23/2014), Snoring (05/16/2014), Sprain of joints and ligaments of unspecified parts of neck, initial encounter (12/19/2014), Unspecified visual loss, and Vitamin D deficiency, unspecified. He was referred to the [facility] for evaluation of sleep apnea.     Problem List Items Addressed This Visit       Obstructive sleep apnea on CPAP       Problem List and Orders    Pmhx includes seasonal allergies, HTN, GERD.     1- PIOTR  PSG 1/4/21 --> severe PIOTR worse in the supine position, AHI 32.     Reviewed and discussed the above sleep study results and management options in details. All questions answered, patient verbalizes understanding.     -patient interested in restarting pap therapy.   -Adjusting settings from . Auto 8-15 cwp, rAHI 4.2, median pressure 9.1, max. Avg 10.5, poor compliance only 2 days of usage in 2022--> to autopap 4-15cwp as he feels that pressure is too high. Also adjusting ramp from auto to 45min at 4cwp.     -counseled on the importance of compliance.   -ordered supplies.  -ordered mask refitting, also provided a sample mask.      -do not drive or operate heavy machinery if drowsy.  -avoid sleeping on your back.   -avoid sedating substances/ medication, alcohol, illicit drugs and tobacco.    2- Obesity  counseled on eating a healthy diet and exercising as tolerated.    Follow up in 3 months or sooner as needed.

## 2024-06-13 NOTE — PATIENT INSTRUCTIONS
[facility]   [facility]  [address] [address]  [phone]       NAME: Ebenezer Campbell   DATE: 6/13/2024     Your Sleep Provider Today: Daren Hernández DO  Your Primary Care Physician: Oral Castellano MD   Your Referring Provider: Oral Castellano MD    DIAGNOSIS:   1. PIOTR (obstructive sleep apnea)  Positive Airway Pressure (PAP) Therapy    CANCELED: Positive Airway Pressure (PAP) Therapy      2. Obstructive sleep apnea on CPAP  Referral to Adult Sleep Medicine          Thank you for coming to the Sleep Medicine Clinic today! Your sleep medicine provider today was: Daren Hernández DO Below is a summary of your treatment plan, other important information, and our contact numbers:      TREATMENT PLAN     Follow up in 3 months or sooner as needed.     Instructions - Common PIOTR Recs: - For your sleep apnea, continue to use your PAP every night and use it whenever you are sleeping.   - Avoid alcohol or sedatives several hours prior to sleeping.   - Get additional supplies for your PAP (e.g., mask, hose, filters) every 3 months or as your insurance allows from your [employer] company. Replacement cushions for your PAP mask can be requested monthly if airseals are an issue.  - Remember to clean your mask, tubings, and water chamber regularly as instructed.  - Avoid driving or operating heavy machinery when drowsy. A person driving while sleepy is five (5) times more likely to have an accident. If you feel sleepy, pull over and take a short power nap (sleep for less than 30 minutes). Otherwise, ask somebody to drive you.      IMPORTANT INFORMATION     Call 911 for medical emergencies.  Our offices are generally open from Monday-Friday, 9 am - 5 pm.  If you need to get in touch with me, you may either call me and my team(number is below) or you can use [email or web address].  If a referral for a test, for CPAP, or for another specialist was made, and you have not heard  about scheduling this within a week, please call scheduling at [phone] ([phone]).  If you are unable to make your appointment for clinic or an overnight study, kindly call the office at least 48 hours in advance to cancel and reschedule.  If you are on CPAP, please bring your device's card or the device to each clinic appointment.   There are no supporting services by either the sleep doctors or their staff on weekends and Holidays, or after 5 PM on weekdays.   If you have been asked to come to a sleep study, make sure you bring toiletries, a comfy pillow, and any nighttime medications that you may regularly take. Also be sure to eat dinner before you arrive. We generally do not provide meals.      PRESCRIPTIONS     We require 7 days advanced notice for prescription refills. If we do not receive the request in this time, we cannot guarantee that your medication will be refilled in time.      IMPORTANT PHONE NUMBERS     Sleep Medicine Clinic Fax: [phone]  Appointments (for Pediatric Sleep Clinic): [phone] - option 1  Appointments (for Adult Sleep Clinic): [phone] - option 2  Appointments (For Sleep Studies): [phone] - option 3  Behavioral Sleep Medicine: [phone]  Sleep Surgery: [phone]  ENT (Otolaryngology): [phone]  Headache Clinic (Neurology): [phone]  Neurology: [phone]  Psychiatry: [phone]  Pulmonary Function Testing (PFT) Center: [phone]  Pulmonary Medicine: [phone]  [employer] (DME): [phone]  [employer] (DME): [phone]  [facility] ([facility]): [phone]      OUR ADULT SLEEP MEDICINE TEAM   Please do not hesitate to call the office or sleep nurse with any questions between appointments:    Adult Sleep Nurses (Amaya Whitney, RN and Pallavi Rajput RN):  For clinical questions and refilling prescriptions: [phone]  Email sleep diaries and other documents at:  [email or web address]    Adult Sleep Medicine Secretaries:  Zahira Ding (For Leilani/Garcia/Krise/Strohl/Yeh/Garcia):   P: [phone]  F: [phone]  Salud Waldron (For Booth/Renuenitalialer): P: [phone]  Fax: [phone]  Abena Wilkes (For Jurcevic/Blank): P: [phone]  F: [phone]  Munira Hernandez (For Linda): P: [phone]  F: [phone]  Kiara Raymond (For Anahy/Veronique/Zakhary): P: [phone]  F: [phone]  Nessa Webber (For Sergey/Anton): P: [phone]  F: [phone]     Adult Sleep Medicine Advanced Practice Providers:  Tha Bernard ([place], [place])  Luisa Piper ([facility])  Patrizia Crowley CNP ([place], [place], [place])  Selma Joyner CNP ([place], [place], [place])  Hattie Mckeon ([place], [place], [place])  Iliana Walton CNP ([facility])        OUR SLEEP TESTING LOCATIONS     Our team will contact you to schedule your sleep study, however, you can contact us as follow:  Main Phone Line (scheduling only): [phone]), option 3  Adult and Pediatric Locations  [facility] (6 years and older): [facility] - 4th floor ([address] [place] [address]) After hours line: [phone]  [facility] at [facility] (Main campus: All ages): [facility], 6th floor. After hours line: [phone]   [place] (5 years and older; younger considered on case-by-case basis): [address]; [address], [address]. Scheduling  After hours line: [phone]   [place] (6 years and older): [address]; [address]; [address]   [place] (6 years and older): [address], [address]  After hours line: [phone]   [place] (13 years and older) in [place]: [address], 2nd floor  After hours line: [phone]   [place] (13 year and older): [address]  After hours line: [phone]     Adult Only Locations:   [place] (18 years  "and older): [address] [facility], 2nd floor   [place] (18 years and older): [address]; 4th floor  After hours line: [phone]   [place] (18 years and older) at [place]: [address] [place] [address]  After hours line: [phone]          CONTACTING YOUR SLEEP MEDICINE PROVIDER     Send a message directly to your provider through \"My Chart\", which is the email service through your  Records Account: [email or web address]   Call [phone] and leave a message. One of the administrative assistants will forward the message to your sleep medicine provider through \"My Chart\" and/or email.     Your sleep medicine provider for this visit was: Daren Hernández DO        "

## 2024-06-17 ENCOUNTER — TREATMENT (OUTPATIENT)
Dept: PHYSICAL THERAPY | Facility: CLINIC | Age: 79
End: 2024-06-17
Payer: MEDICARE

## 2024-06-17 DIAGNOSIS — M25.561 RIGHT KNEE PAIN: ICD-10-CM

## 2024-06-17 DIAGNOSIS — M62.81 MUSCLE WEAKNESS: Primary | ICD-10-CM

## 2024-06-17 PROCEDURE — 97110 THERAPEUTIC EXERCISES: CPT | Mod: GP | Performed by: PHYSICAL THERAPIST

## 2024-06-17 PROCEDURE — 97530 THERAPEUTIC ACTIVITIES: CPT | Mod: GP | Performed by: PHYSICAL THERAPIST

## 2024-06-17 ASSESSMENT — PAIN - FUNCTIONAL ASSESSMENT: PAIN_FUNCTIONAL_ASSESSMENT: 0-10

## 2024-06-17 ASSESSMENT — PAIN SCALES - GENERAL: PAINLEVEL_OUTOF10: 1

## 2024-06-17 NOTE — PROGRESS NOTES
"Physical Therapy    Physical Therapy Treatment    Patient Name: Ebenezer Campbell  MRN: [id number]  Insurance:  [facility] Medicare Advantage  0% coins, no ded/oop, no copay, bmn jarrett yr, no PA  Visit 3 of 20 (re-assess at 10)  Today's Date: 6/18/2024                          Current Problem  1. Muscle weakness        2. Right knee pain  Follow Up In Physical Therapy            Subjective    General  Pt reports that most of the time his knee isn't too bad. Sometimes when in WB, at random, he gets a sharp twinge.     Precautions  Precautions  Precautions Comment: no falls since last visit  Vital Signs     Pain  Pain Assessment  Pain Assessment: 0-10  Pain Score: 1    Objective   ROM:   6-3-2024: Prone quad stretch knee flexion on R= 127*    Treatments:  Ther Ex 91908: 29/2  NuStep, L3 5 min.  Gastroc, soleus, hamstring stretches at step, :30 1x each  Hip flexor/quad stretch at step, :30 1x each side.  Reverse step quad stretch :30 2x  TKE 15# 20x  Ham curl machine (seat 2, cam 4, foot 3) 50# 2x10  Hip 7, #4-7, 10x each  Bridges with Tband, green, 20x    Not performed today:  SLR 10x  Tband hip x4, red, 10x each     Manual Ther Tech 92559:---  Not performed today:  Gr 2-3 med/lat and sup/inf R patellar glides.   Mio roller massage to distal quad  DTM to mid and distal quad    Ther Act 41514:- 9/1  Step ups, Lat, 6\" 15x  HR/TR on foam bar, 10x  Leg Press with TA, bilat, 105# 2x10    OP EDUCATION:     Assessment:  Pt noted a little irritation in the patellar tendon after TKE, but otherwise charlee program well without signif pain. Added several hip-specific exercises to improve support for better knee mechanics. Add'l work with proximal strengthening is recommended to further WB stabilization.    Plan:  Cont to progress as charlee with hip and knee stability.     Goals:    "

## 2024-07-05 ENCOUNTER — TREATMENT (OUTPATIENT)
Dept: PHYSICAL THERAPY | Facility: CLINIC | Age: 79
End: 2024-07-05
Payer: COMMERCIAL

## 2024-07-05 DIAGNOSIS — M62.81 MUSCLE WEAKNESS: Primary | ICD-10-CM

## 2024-07-05 DIAGNOSIS — M25.561 RIGHT KNEE PAIN: ICD-10-CM

## 2024-07-05 PROCEDURE — 97530 THERAPEUTIC ACTIVITIES: CPT | Mod: GP | Performed by: PHYSICAL THERAPIST

## 2024-07-05 PROCEDURE — 97110 THERAPEUTIC EXERCISES: CPT | Mod: GP | Performed by: PHYSICAL THERAPIST

## 2024-07-05 ASSESSMENT — PAIN - FUNCTIONAL ASSESSMENT: PAIN_FUNCTIONAL_ASSESSMENT: 0-10

## 2024-07-05 ASSESSMENT — PAIN SCALES - GENERAL: PAINLEVEL_OUTOF10: 0 - NO PAIN

## 2024-07-05 NOTE — PROGRESS NOTES
"Physical Therapy    Physical Therapy Treatment    Patient Name: Ebenezer Campbell  MRN: [id number]  Insurance:  [facility] Medicare Advantage  0% coins, no ded/oop, no copay, bmn jarrett yr, no PA  Visit 5 of 20 (re-assess at 10)  Today's Date: 7/5/2024  Time Calculation  Start Time: 0910  Stop Time: 0955  Time Calculation (min): 45 min     PT Therapeutic Procedures Time Entry  Neuromuscular Re-Education Time Entry: 3  Therapeutic Exercise Time Entry: 27  Therapeutic Activity Time Entry: 9                 Current Problem  1. Muscle weakness        2. Right knee pain  Follow Up In Physical Therapy          Subjective    General  Pt notes that he is doing well, sharp twinges in the knee have reduced in frequency, and there is little at this time that he is restricted in doing.     Precautions  Precautions  Precautions Comment: no falls since last visit  Pain  Pain Assessment  Pain Assessment: 0-10  0-10 (Numeric) Pain Score: 0 - No pain    Objective   ROM:   6-3-2024: Prone quad stretch knee flexion on R= 127*    Treatments:  Ther Ex 19715: 27/2  *updated goals*  NuStep, L3 5 min.  Gastroc, soleus, hamstring stretches at step, :30 1x each  Hip flexor/quad stretch at step, :30 1x each side.  Reverse step quad stretch :30 1x  Ham curl machine (seat 2, cam 4, foot 3) 50# 2x10  TKE 17.5# 20x  Bridges with Tband green, 20x    Ther Act 85826:- 9/1  HR/TR on foam bar, 10x  Leg Press with TA, bilat, 115# 3x10  Tapdowns, 4\" 15x    Neuromusc Re-edu 84957: 3/0  SLS :30 2x      OP EDUCATION:     Assessment:  Pt noted some feeling of instability with descending stairs with the R knee, so added tapdowns and SLS to address that. Pt was well-challenged with both, and encouraged to add them to HEP.   Pt is doing very well functionally. He has met or nearly met all goals, and has shown good motivation and indep with pursuing home exercises. Skilled PT is no longer required at this time.    Plan:  Discharge to [facility].     Goals:  Goals to be achieved " by discharge, approx 12 weeks.     Patient will verbalize pain (0-10) = 0/10 at rest and 2/10 at worst with activity.  (7-5-2024: 0-3/10)     Patient will correctly perform home exercise program to progress current functional status.   (7-5-2024: HEP- something every day)     Increase quad flexibility: prone (or equivalent) knee flexion = 120* to decrease patellar/quad tendons during activities of compression like stair descent.   (7-5-2024: prone knee flexion on 6-2-2024 was 127*)     Increase quad strength to 65# to improve stability and control of the L knee.   (7-5-2024: R quad = 75#)     Pt will ascend and descend stairs with recip gait with minimal UE support without signif deviation to improve home navigation.    (7-5-2024: recip ascending and descending. Some instability and discomfort when descending)     Pt will tolerate walking > 45 min without increase in pain/gait deviations to get closer to prior function of 60 min.  (7-5-2024: he was up to 6 miles of walking in one day.)

## 2024-07-10 ENCOUNTER — OFFICE VISIT (OUTPATIENT)
Dept: ORTHOPEDIC SURGERY | Facility: CLINIC | Age: 79
End: 2024-07-10
Payer: COMMERCIAL

## 2024-07-10 DIAGNOSIS — G89.18 POST-OP PAIN: Primary | ICD-10-CM

## 2024-07-10 PROCEDURE — 99211 OFF/OP EST MAY X REQ PHY/QHP: CPT | Performed by: STUDENT IN AN ORGANIZED HEALTH CARE EDUCATION/TRAINING PROGRAM

## 2024-07-10 ASSESSMENT — PAIN - FUNCTIONAL ASSESSMENT: PAIN_FUNCTIONAL_ASSESSMENT: NO/DENIES PAIN

## 2024-07-10 NOTE — PROGRESS NOTES
History of Present Illness:  Patient returns today noting minimal pain and improving activity level. He is doing very well and is able to golf without problems.     Physical Examination:   Trace effusion  Well healed incisions   Full range of motion   Negative Megan´s test  Distal neurovascular exam intact    Operative Findings:  Patella: 2  Trochlea: 1  Medial compartment: 2  Lateral compartment: 0    Assessment:  Patient status post right knee arthroscopy partial medial meniscectomy with chondroplasty    Plan:  Discussed return to activity and home exercise program.  Discussed analgesia, NSAID usage (risks and benefits).  He is doing very well.   Follow-up in ~ 1 month if still symptomatic      Brice Vyas PA-C

## 2024-07-11 ENCOUNTER — APPOINTMENT (OUTPATIENT)
Dept: PHYSICAL THERAPY | Facility: CLINIC | Age: 79
End: 2024-07-11
Payer: COMMERCIAL

## 2024-07-30 ENCOUNTER — APPOINTMENT (OUTPATIENT)
Dept: CARDIOLOGY | Facility: CLINIC | Age: 79
End: 2024-07-30
Payer: MEDICARE

## 2024-08-07 ENCOUNTER — OFFICE VISIT (OUTPATIENT)
Dept: CARDIOLOGY | Facility: CLINIC | Age: 79
End: 2024-08-07
Payer: COMMERCIAL

## 2024-08-07 VITALS
OXYGEN SATURATION: 95 % | HEIGHT: 69 IN | BODY MASS INDEX: 30.53 KG/M2 | HEART RATE: 70 BPM | DIASTOLIC BLOOD PRESSURE: 69 MMHG | SYSTOLIC BLOOD PRESSURE: 111 MMHG | WEIGHT: 206.1 LBS

## 2024-08-07 DIAGNOSIS — R07.89 ATYPICAL CHEST PAIN: Primary | ICD-10-CM

## 2024-08-07 PROCEDURE — 99214 OFFICE O/P EST MOD 30 MIN: CPT | Performed by: INTERNAL MEDICINE

## 2024-08-07 ASSESSMENT — PAIN SCALES - GENERAL: PAINLEVEL: 0-NO PAIN

## 2024-08-07 ASSESSMENT — ENCOUNTER SYMPTOMS
DEPRESSION: 0
LOSS OF SENSATION IN FEET: 0
OCCASIONAL FEELINGS OF UNSTEADINESS: 0

## 2024-08-07 NOTE — PROGRESS NOTES
Primary Care Physician: Oral Castellano MD  Date of Visit: 08/07/2024  3:20 PM EDT  Location of visit: [facility] [place] [address]     Chief Complaint:   Chief Complaint   Patient presents with    Follow-up    Edema        HPI / Summary:   Ebenezer Campbell is a 78 y.o. male presents for followup.     11/2020  2. Successful OCT guided PCI of the proximal-mid LAD (Xience Iza 2.5 x 38 mm ROGER), mid LAD (Xience Iza 2.5 x 18 mm ROGER), with 0% residual stenosis and RENARD-3 flow.   Prostate caTurp, ADT, SBRT to prostate, low PSA      Specialty Problems          Cardiology Problems    Agatston coronary artery calcium score greater than 400    Coronary artery disease of native heart with stable angina pectoris (CMS-HCC)    Dyslipidemia, goal LDL below 70    Essential hypertension with goal blood pressure less than 130/85    Peripheral vascular disease (CMS-HCC)    Presence of drug coated stent in LAD coronary artery        Past Medical History:   Diagnosis Date    Contact with and (suspected) exposure to other viral communicable diseases 12/30/2014    Exposure to influenza    Dysphagia, unspecified 04/16/2013    Difficulty swallowing    Early cataracts, bilateral 04/18/2023    Impacted cerumen, right ear 02/16/2017    Impacted cerumen of right ear    Obstructive sleep apnea (adult) (pediatric) 10/28/2015    Obstructive sleep apnea on CPAP    Other conditions influencing health status     Nephrolithiasis    Other enthesopathies, not elsewhere classified     Tendonitis of shoulder    Other fatigue     Tired    Other fracture of first lumbar vertebra, initial encounter for closed fracture (Multi)     Closed fracture of L1 vertebra    Personal history of other diseases of the circulatory system 10/01/2015    History of hypertension    Personal history of other diseases of the musculoskeletal system and connective tissue 12/16/2014    History of low back pain    Personal history of other diseases of the nervous system and sense organs  02/06/2015    History of sleep apnea    Personal history of other diseases of the respiratory system     History of allergic rhinitis    Personal history of other diseases of urinary system 10/28/2015    History of hematuria    Personal history of other specified conditions     History of edema    Residual hemorrhoidal skin tags 04/23/2014    External hemorrhoids    Snoring 05/16/2014    Snoring    Sprain of joints and ligaments of unspecified parts of neck, initial encounter 12/19/2014    Neck sprain    Unspecified visual loss     Vision problem    Vitamin D deficiency, unspecified     Vitamin D deficiency          Past Surgical History:   Procedure Laterality Date    COLONOSCOPY  11/28/2012    Complete Colonoscopy    ESOPHAGOGASTRODUODENOSCOPY  10/28/2016    Esophagogastroduodenoscopy With Biopsy    KNEE ARTHROSCOPY W/ DEBRIDEMENT  04/23/2014    Arthroscopy Knee Left    OTHER SURGICAL HISTORY  01/22/2020    Cataract surgery    OTHER SURGICAL HISTORY  02/04/2022    Esophagogastroduodenoscopy    OTHER SURGICAL HISTORY  11/19/2021    Cardiac catheterization with stent placement    UMBILICAL HERNIA REPAIR  11/28/2012    Umbilical Hernia Repair          Social History:   reports that he has never smoked. He has never used smokeless tobacco. He reports current alcohol use. He reports that he does not use drugs.      Allergies:  No Known Allergies    Outpatient Medications:  Current Outpatient Medications   Medication Instructions    atorvastatin (Lipitor) 80 mg tablet TAKE 1 TABLET BY MOUTH EVERY DAY FOR HIGH CHOLESTEROL    cetirizine (ZYRTEC) 10 mg, oral, Daily PRN    cholecalciferol (Vitamin D-3) 50 mcg (2,000 unit) capsule 1 capsule, oral, Daily    fluticasone (Flonase) 50 mcg/actuation nasal spray 1 spray, nasal, 2 times daily    losartan (COZAAR) 50 mg, oral, Daily    MULTIVITAMIN ORAL 1 capsule, oral, Daily    omeprazole (PRILOSEC) 40 mg, oral, Daily before breakfast    tadalafil (Cialis) 10 mg tablet 1/2 or 1  "tablet daily as needed for erectile dysfunction    tamsulosin (FLOMAX) 0.4 mg, oral, Daily       ROS     Physical Exam:  Vitals:    08/07/24 1551   BP: 111/69   BP Location: Left arm   Patient Position: Sitting   BP Cuff Size: Large adult   Pulse: 70   SpO2: 95%   Weight: 93.5 kg (206 lb 1.6 oz)   Height: 1.753 m (5' 9\")     Wt Readings from Last 5 Encounters:   08/07/24 93.5 kg (206 lb 1.6 oz)   06/13/24 97.7 kg (215 lb 6.4 oz)   05/28/24 98.1 kg (216 lb 4.3 oz)   05/06/24 96.2 kg (212 lb)   04/26/24 98.9 kg (218 lb)     Body mass index is 30.44 kg/m².   Well-developed male in no acute distress.  Flat JVP.  Normal carotid upstrokes no bruits.  Regular rhythm without gallop or murmur.  Clear lungs without crackles.  Soft abdomen without masses.  No dependent edema with intact pedal pulses     Last Labs:  CMP:  Recent Labs     05/01/24  0755 02/20/24  1018 11/15/23  0806 08/23/23  0816 06/23/23  1156    142 141 140 142   K 4.3 3.8 4.7 3.6 3.9   * 105 105 104 106   CO2 29 28 30 28 28   ANIONGAP 9* 13 11 12 12   BUN 29* 17 21 27* 20   CREATININE 0.85 0.87 0.87 1.01 0.77   EGFR 89 89 89  --   --    GLUCOSE 112* 92 98 92 105*     Recent Labs     05/01/24  0755 02/20/24  1018 11/15/23  0806 08/23/23  0816 06/23/23  1156   ALBUMIN 3.9 4.3 3.9 3.8 4.2   ALKPHOS 91 95 79 75 103   ALT 15 22 18 21 45   AST 16 23 20 24 35   BILITOT 0.4 0.7 0.6 0.8 0.8     CBC:  Recent Labs     05/01/24  0755 02/20/24  1018 11/15/23  0806 08/23/23  0816 06/23/23  1156   WBC 5.3 4.5 4.8 4.4 7.7   HGB 12.7* 13.8 12.2* 11.8* 14.0   HCT 38.9* 41.3 36.9* 35.8* 41.8    304 242 224 273   MCV 95 96 97 95 92     COAG:   Recent Labs     05/01/24  0755   INR 0.9     HEME/ENDO:  Recent Labs     11/15/23  0806 04/14/23  0937 06/24/22  0810 04/08/22  0852 05/13/21  0818 11/12/20  1415   IRONSAT 32  --   --   --   --   --    TSH 2.62 1.54 2.93 2.94   < >  --    HGBA1C  --   --   --   --   --  5.4    < > = values in this interval not " "displayed.      CARDIAC: No results for input(s): \"LDH\", \"CKMB\", \"TROPHS\", \"BNP\" in the last 14416 hours.    No lab exists for component: \"CK\", \"CKMBP\"  Recent Labs     11/15/23  0806 04/14/23  0937 06/24/22  0810 04/08/22  0852   CHOL 149 159 133 142   LDLF  --  75 58 63   HDL 68.2 54.3 57.0 62.0   TRIG 100 151* 91 84       Last Cardiology Tests:  ECG:      Echo:  Echo Results:  No results found for this or any previous visit from the past 3650 days.       Cath:      Stress Test:  Stress Results:  No results found for this or any previous visit from the past 365 days.         Cardiac Imaging:  MR knee right wo IV contrast  Narrative: Interpreted By:  Wayne Trivedi,   STUDY:  MR KNEE RIGHT WO IV CONTRAST; ;  4/30/2024 8:29 am      INDICATION:  Signs/Symptoms:pain.      COMPARISON:  None.      ACCESSION NUMBER(S):  [id number]      ORDERING CLINICIAN:  MAIA LAGOS      TECHNIQUE:  Multiplanar and multisequential MR images of the right knee are  performed. There is mild motion degradation.      FINDINGS:  There is a moderate to large sized joint effusion. There is some  ill-defined fluid and edema in the anterior subcutaneous fat in the  posterior intermuscular fat planes. There is a small septated Baker's  cyst.      There are mild osteoarthritic changes at the medial compartment with  irregular thinning of the articular cartilage. There is moderate  irregular thinning of the patellar articular cartilage at the upper  pole is more pronounced at the central ridge. There is no sign of  acute osseous fracture, bone marrow edema, or osseous mass. There is  no loose osteochondral lesion.      The lateral meniscus is of normal morphology. There is faint  horizontal linear signal though no sign of meniscal tear or  truncation.      The medial meniscus is abnormal. The posterior horn is small with an  irregular contour at the middle and peripheral 3rd. There is some  oblique linear signal in the posterior horn " extending to the inferior  articular surface at the peripheral 3rd. Contour irregularity extends  along the free edge into the posterior meniscal body. The anterior  horn is intact.      There is mild edema and trace fluid superficial to the medial  collateral ligament above and below the joint line. Trace fluid is  identified in the medial collateral ligament bursa. There is no  full-thickness ligament discontinuity or laxity.      The anterior and posterior cruciate ligaments, lateral collateral  ligament complex, popliteus tendon, extensor complex, and patellar  retinacula are intact.      Impression: Mild to moderate changes of osteoarthritis as detailed above.      Tearing of the posterior horn of the medial meniscus extending into  the posterior body. There is linear tearing to the inferior articular  surface as well as contour irregularity along the free edge and  inferior articular surface.      Grade 1 sprain of the medial collateral ligament.      Large joint effusion small septated Baker's cyst.          MACRO:  None      Signed by: Wayne Trivedi 4/30/2024 8:35 AM  Dictation workstation:   [id number]        Assessment/Plan       Very pleasant 78-year-old male status post stent to the LAD x 2 in 2020 doing well clinically.  No change in medical treatment advised.  Discussed some lifestyle interventions to improve overall ASCVD risk.  Benign cardiac exam, no acute EKG changes, follow-up will be scheduled in 12 months  Orders:  No orders of the defined types were placed in this encounter.     Followup Appts:  Future Appointments   Date Time Provider Department Center   8/28/2024 11:00 AM MARLENE Soto-CNP [id number] [facility]   10/11/2024 11:30 AM Oral Castellano MD [id number] [facility]   4/10/2025  1:30 PM Oral Castellano MD [id number] [facility]           ____________________________________________________________  Pedro Lewis MD    Senior Attending Physician  [facility]  [place]  [facility]

## 2024-08-22 DIAGNOSIS — K21.9 GASTROESOPHAGEAL REFLUX DISEASE WITHOUT ESOPHAGITIS: ICD-10-CM

## 2024-08-22 DIAGNOSIS — N52.9 ERECTILE DYSFUNCTION, UNSPECIFIED ERECTILE DYSFUNCTION TYPE: Chronic | ICD-10-CM

## 2024-08-22 DIAGNOSIS — R35.1 BENIGN PROSTATIC HYPERPLASIA WITH NOCTURIA: Primary | ICD-10-CM

## 2024-08-22 DIAGNOSIS — N40.1 BENIGN PROSTATIC HYPERPLASIA WITH NOCTURIA: Primary | ICD-10-CM

## 2024-08-22 RX ORDER — TADALAFIL 10 MG/1
TABLET ORAL
Qty: 12 TABLET | Refills: 11 | Status: SHIPPED | OUTPATIENT
Start: 2024-08-22

## 2024-08-22 RX ORDER — TAMSULOSIN HYDROCHLORIDE 0.4 MG/1
0.4 CAPSULE ORAL DAILY
Qty: 90 CAPSULE | Refills: 3 | Status: SHIPPED | OUTPATIENT
Start: 2024-08-22

## 2024-08-22 RX ORDER — OMEPRAZOLE 40 MG/1
40 CAPSULE, DELAYED RELEASE ORAL
Qty: 90 CAPSULE | Refills: 3 | Status: SHIPPED | OUTPATIENT
Start: 2024-08-22

## 2024-08-23 ENCOUNTER — LAB (OUTPATIENT)
Dept: LAB | Facility: LAB | Age: 79
End: 2024-08-23
Payer: COMMERCIAL

## 2024-08-23 DIAGNOSIS — C61 PROSTATE CANCER (MULTI): ICD-10-CM

## 2024-08-23 LAB
ALBUMIN SERPL BCP-MCNC: 4.1 G/DL (ref 3.4–5)
ALP SERPL-CCNC: 96 U/L (ref 33–136)
ALT SERPL W P-5'-P-CCNC: 18 U/L (ref 10–52)
ANION GAP SERPL CALC-SCNC: 11 MMOL/L (ref 10–20)
AST SERPL W P-5'-P-CCNC: 17 U/L (ref 9–39)
BASOPHILS # BLD AUTO: 0.04 X10*3/UL (ref 0–0.1)
BASOPHILS NFR BLD AUTO: 0.9 %
BILIRUB SERPL-MCNC: 0.3 MG/DL (ref 0–1.2)
BUN SERPL-MCNC: 27 MG/DL (ref 6–23)
CALCIUM SERPL-MCNC: 9.6 MG/DL (ref 8.6–10.3)
CHLORIDE SERPL-SCNC: 107 MMOL/L (ref 98–107)
CO2 SERPL-SCNC: 28 MMOL/L (ref 21–32)
CREAT SERPL-MCNC: 0.74 MG/DL (ref 0.5–1.3)
EGFRCR SERPLBLD CKD-EPI 2021: >90 ML/MIN/1.73M*2
EOSINOPHIL # BLD AUTO: 0.44 X10*3/UL (ref 0–0.4)
EOSINOPHIL NFR BLD AUTO: 9.7 %
ERYTHROCYTE [DISTWIDTH] IN BLOOD BY AUTOMATED COUNT: 14.8 % (ref 11.5–14.5)
GLUCOSE SERPL-MCNC: 94 MG/DL (ref 74–99)
HCT VFR BLD AUTO: 38.6 % (ref 41–52)
HGB BLD-MCNC: 12.6 G/DL (ref 13.5–17.5)
IMM GRANULOCYTES # BLD AUTO: 0.01 X10*3/UL (ref 0–0.5)
IMM GRANULOCYTES NFR BLD AUTO: 0.2 % (ref 0–0.9)
LYMPHOCYTES # BLD AUTO: 1.66 X10*3/UL (ref 0.8–3)
LYMPHOCYTES NFR BLD AUTO: 36.5 %
MCH RBC QN AUTO: 31.2 PG (ref 26–34)
MCHC RBC AUTO-ENTMCNC: 32.6 G/DL (ref 32–36)
MCV RBC AUTO: 96 FL (ref 80–100)
MONOCYTES # BLD AUTO: 0.47 X10*3/UL (ref 0.05–0.8)
MONOCYTES NFR BLD AUTO: 10.3 %
NEUTROPHILS # BLD AUTO: 1.93 X10*3/UL (ref 1.6–5.5)
NEUTROPHILS NFR BLD AUTO: 42.4 %
NRBC BLD-RTO: 0 /100 WBCS (ref 0–0)
PLATELET # BLD AUTO: 247 X10*3/UL (ref 150–450)
POTASSIUM SERPL-SCNC: 4.4 MMOL/L (ref 3.5–5.3)
PROT SERPL-MCNC: 6.3 G/DL (ref 6.4–8.2)
RBC # BLD AUTO: 4.04 X10*6/UL (ref 4.5–5.9)
SODIUM SERPL-SCNC: 142 MMOL/L (ref 136–145)
TESTOST SERPL-MCNC: 300 NG/DL (ref 240–1000)
WBC # BLD AUTO: 4.6 X10*3/UL (ref 4.4–11.3)

## 2024-08-25 LAB
PSA FREE MFR SERPL: <100 %
PSA FREE SERPL-MCNC: <0.1 NG/ML
PSA SERPL IA-MCNC: 0.1 NG/ML (ref 0–4)

## 2024-08-27 ENCOUNTER — TELEPHONE (OUTPATIENT)
Dept: RADIATION ONCOLOGY | Facility: HOSPITAL | Age: 79
End: 2024-08-27
Payer: COMMERCIAL

## 2024-08-27 ASSESSMENT — ENCOUNTER SYMPTOMS
ANAL BLEEDING: 0
UNEXPECTED WEIGHT CHANGE: 0
COUGH: 0
PALPITATIONS: 0
DIARRHEA: 0
ALLERGIC/IMMUNOLOGIC NEGATIVE: 1
RECTAL PAIN: 0
DIZZINESS: 0
BACK PAIN: 0
SHORTNESS OF BREATH: 0
FREQUENCY: 0
FATIGUE: 0
CHEST TIGHTNESS: 0
ABDOMINAL PAIN: 0
DIFFICULTY URINATING: 0
HEMATURIA: 0
ARTHRALGIAS: 0
PSYCHIATRIC NEGATIVE: 1
WEAKNESS: 0
DYSURIA: 0
JOINT SWELLING: 0
BLOOD IN STOOL: 0
FEVER: 0
CONSTIPATION: 0

## 2024-08-27 NOTE — PROGRESS NOTES
"Cancer synopsis:  Rad/onc: Dr. Malloy/ [facility]    78 M initially presented with elevated PSA, transrectal US-guided prostate biopsy showed GS 4+3 disease in 3 samples, 9/12 cores positive. Enrolled in [facility]  010 assigned  to daro and adt 6m w/ SBRT.     PSA:  2/15/23 5.8  1/16/23 6.8  7/8/22 4.5     He had a prostate MRI and PSMA PET/CT showing no evidence of metastatic disease.     Decipher score 0.63     05/25/2023: Started ADT and daro 6m course     SBRT to prostate and SV 06/23/2023    Recent imaging:  MR of knee 04/2024: Meniscus tear and sprain  Ct chest wo 02/2024 dilated thoracic aorta     History of presenting illness:    Patient ID: [id number]     Ebenezer Campbell is a 78 y.o. 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    RT Site: Prostate and SV  RT Date: 06/23/2023  Hormone therapy: Yes, completed 11/2023 with 6m of ADT and daro w/ testosterone recovery  Hot Flushes: Denies  Fatigue: Denies  Bone pain: Denies  ED: Admits to loss of sexual function since starting systemic therapy. Has used Viagra with variable response. Admits orgasms are now \"dry\" and states orgasm sensation has reduced.  - Quality of erections during the last 4 weeks: 2 = Not firm enough for any sexual activity  - Use of erectile dysfunction medications:  Viagra (has cialis rx but not fill or used)  IPSS: 7  Urinary symptoms: Denies dysuria, hematuria. Denies weak stream or incomplete bladder emptying. Does report that with coffee intake increased urgency mild leakage of urine. Denies urgency outside of this. Denies nocturia  Urinary Medications: Yes, flowmax daily  Rectal bleeding: Denies  Colonoscopy: 12/2019, multiple polyps removed, diverticulosis noted, Next due now  Other systems: Denies SOB, CP or fever. Recent had r knee meniscus repair, recovering well.    Review of systems:  Review of Systems   Constitutional:  Negative for fatigue, " fever and unexpected weight change.   Respiratory:  Negative for cough, chest tightness and shortness of breath.    Cardiovascular:  Negative for chest pain, palpitations and leg swelling.   Gastrointestinal:  Negative for abdominal pain, anal bleeding, blood in stool, constipation, diarrhea and rectal pain.   Endocrine: Negative for cold intolerance, heat intolerance and polyuria.   Genitourinary:  Negative for decreased urine volume, difficulty urinating, dysuria, frequency, hematuria and urgency.   Musculoskeletal:  Negative for arthralgias, back pain, gait problem and joint swelling.   Skin: Negative.    Allergic/Immunologic: Negative.    Neurological:  Negative for dizziness, syncope and weakness.   Psychiatric/Behavioral: Negative.       Past Medical history  Past Medical History:   Diagnosis Date    Contact with and (suspected) exposure to other viral communicable diseases 12/30/2014    Exposure to influenza    Dysphagia, unspecified 04/16/2013    Difficulty swallowing    Early cataracts, bilateral 04/18/2023    Impacted cerumen, right ear 02/16/2017    Impacted cerumen of right ear    Obstructive sleep apnea (adult) (pediatric) 10/28/2015    Obstructive sleep apnea on CPAP    Other conditions influencing health status     Nephrolithiasis    Other enthesopathies, not elsewhere classified     Tendonitis of shoulder    Other fatigue     Tired    Other fracture of first lumbar vertebra, initial encounter for closed fracture (Multi)     Closed fracture of L1 vertebra    Personal history of other diseases of the circulatory system 10/01/2015    History of hypertension    Personal history of other diseases of the musculoskeletal system and connective tissue 12/16/2014    History of low back pain    Personal history of other diseases of the nervous system and sense organs 02/06/2015    History of sleep apnea    Personal history of other diseases of the respiratory system     History of allergic rhinitis    Personal  history of other diseases of urinary system 10/28/2015    History of hematuria    Personal history of other specified conditions     History of edema    Residual hemorrhoidal skin tags 2014    External hemorrhoids    Snoring 2014    Snoring    Sprain of joints and ligaments of unspecified parts of neck, initial encounter 2014    Neck sprain    Unspecified visual loss     Vision problem    Vitamin D deficiency, unspecified     Vitamin D deficiency      Surgical/family history  Family History   Problem Relation Name Age of Onset    Other (natural causes) Mother           at 93    Aortic aneurysm Father           at 87,  natural causes    Dementia Father          AAA repair in his 60s    No Known Problems Sister      Diabetes Brother González          at 76 - arrested at home w/ chest pain    Prostate cancer Brother González         IDDM since his 20s    Heart disease Brother González     Heart attack Brother Bill     Other (heart stents) Brother Bill     Allergies Brother Bill     Other (cardiac stents) Brother Stephen     Parkinsonism Brother andrei     Kidney cancer Brother andrei     Prostate cancer Brother Abdoul     Other (elevated psa) Brother Aguilar     No Known Problems Daughter      No Known Problems Daughter      No Known Problems Daughter      Alopecia Son Maximilian     Cystic fibrosis Son Esteban         no CF symptoms    Coronary artery disease Other Family History       Past Surgical History:   Procedure Laterality Date    COLONOSCOPY  2012    Complete Colonoscopy    ESOPHAGOGASTRODUODENOSCOPY  10/28/2016    Esophagogastroduodenoscopy With Biopsy    KNEE ARTHROSCOPY W/ DEBRIDEMENT  2014    Arthroscopy Knee Left    OTHER SURGICAL HISTORY  2020    Cataract surgery    OTHER SURGICAL HISTORY  2022    Esophagogastroduodenoscopy    OTHER SURGICAL HISTORY  2021    Cardiac catheterization with stent placement    UMBILICAL HERNIA REPAIR  2012    Umbilical Hernia Repair       Social History  Tobacco Use: Low Risk  (8/7/2024)    Patient History     Smoking Tobacco Use: Never     Smokeless Tobacco Use: Never     Passive Exposure: Not on file       Current med list:  Current Outpatient Medications   Medication Instructions    atorvastatin (Lipitor) 80 mg tablet TAKE 1 TABLET BY MOUTH EVERY DAY FOR HIGH CHOLESTEROL    cetirizine (ZYRTEC) 10 mg, oral, Daily PRN    cholecalciferol (Vitamin D-3) 50 mcg (2,000 unit) capsule 1 capsule, oral, Daily    fluticasone (Flonase) 50 mcg/actuation nasal spray 1 spray, nasal, 2 times daily    losartan (COZAAR) 50 mg, oral, Daily    MULTIVITAMIN ORAL 1 capsule, oral, Daily    omeprazole (PRILOSEC) 40 mg, oral, Daily before breakfast    tadalafil (Cialis) 10 mg tablet 1/2 or 1 tablet daily as needed for erectile dysfunction    tamsulosin (FLOMAX) 0.4 mg, oral, Daily, For prostate      Last recorded vital:  /71   Pulse 65   Temp 36.4 °C (97.5 °F) (Temporal)   Resp 18   Wt 96.5 kg (212 lb 11.2 oz)   SpO2 97%   BMI 31.41 kg/m²     Physical Exam  Constitutional:       Appearance: Normal appearance.   Cardiovascular:      Rate and Rhythm: Normal rate.   Pulmonary:      Effort: Pulmonary effort is normal.      Breath sounds: Normal breath sounds.   Musculoskeletal:         General: Normal range of motion.      Cervical back: Normal range of motion.   Neurological:      Mental Status: He is alert and oriented to person, place, and time.   Psychiatric:         Mood and Affect: Mood normal.         Behavior: Behavior normal.         Thought Content: Thought content normal.         Judgment: Judgment normal.       Pertinent labs:  Prostate Specific AG   Date/Time Value Ref Range Status   11/15/2023 08:06 AM <0.01 <=4.00 ng/mL Final     PSA   Date/Time Value Ref Range Status   08/23/2024 07:34 AM 0.1 0.0 - 4.0 ng/mL Final     Comment:     INTERPRETIVE INFORMATION: Prostate Specific Antigen    The Roche PSA electrochemiluminescent immunoassay is used.  Results   obtained with different test methods or kits cannot be used   interchangeably. The Roche PSA method is approved for use as an   aid in the detection of prostate cancer when used in conjunction   with a digital rectal exam in individuals with a prostate age 50   years and older. The Roche PSA is also indicated for the serial   measurement of PSA to aid in the prognosis and management of   prostate cancer patients. Elevated PSA concentrations can only   suggest the presence of prostate cancer until biopsy is performed.   PSA concentrations can also be elevated in benign prostatic   hyperplasia or inflammatory conditions of the prostate. PSA is   generally not elevated in healthy individuals or individuals with   nonprostatic carcinoma.     Dx:  Problem List Items Addressed This Visit       Prostate cancer (Multi) - Primary    Relevant Orders    Clinic Appointment Request Follow Up; YAN CERVANTES; [facility] [address] [facility] (Completed)   PSA of 0.10 was reviewed and is declining nicely. Testerone is currently 300 and is within normal ranges. CBC stable, mild anemia likely r/t chronic disease.  Review of latent SE including rectal bleeding, hematuria, urinary strictures, ED where reviewed as well as how to contact office if s/s present. Denies latent SE. Does report some ongoing ED however had issues prior. Likely current ED loss is caused by low Testerone. NCCN guidelines where reviewed and routine FUV of every 3m for first year and every 6m for four years for a total of five years was discussed. Patient verbalized understanding.      Cialis 10 mg PRN sexual function. Discussed can increased to 20 mg if needed for sexual function. Will consider. Reports on 10mg mild effect at this time.    PLAN:  FUV 6m per [facility]  010  Labs CBC, CMP, PSA and testerone  Imaging none  Flowmax daily  Cialis 10mg PRN  FUV other providers: PCP for routine evals    Please contact office with any concerns:  Yan Salcedo  CNP  [phone]

## 2024-08-28 ENCOUNTER — HOSPITAL ENCOUNTER (OUTPATIENT)
Dept: RADIATION ONCOLOGY | Facility: HOSPITAL | Age: 79
Setting detail: RADIATION/ONCOLOGY SERIES
Discharge: HOME | End: 2024-08-28
Payer: COMMERCIAL

## 2024-08-28 VITALS
TEMPERATURE: 97.5 F | OXYGEN SATURATION: 97 % | HEART RATE: 65 BPM | RESPIRATION RATE: 18 BRPM | DIASTOLIC BLOOD PRESSURE: 71 MMHG | SYSTOLIC BLOOD PRESSURE: 113 MMHG | BODY MASS INDEX: 31.41 KG/M2 | WEIGHT: 212.7 LBS

## 2024-08-28 DIAGNOSIS — C61 PROSTATE CANCER (MULTI): Primary | ICD-10-CM

## 2024-08-28 PROCEDURE — 99214 OFFICE O/P EST MOD 30 MIN: CPT

## 2024-08-28 ASSESSMENT — PATIENT HEALTH QUESTIONNAIRE - PHQ9
2. FEELING DOWN, DEPRESSED OR HOPELESS: NOT AT ALL
1. LITTLE INTEREST OR PLEASURE IN DOING THINGS: NOT AT ALL
SUM OF ALL RESPONSES TO PHQ9 QUESTIONS 1 AND 2: 0

## 2024-08-28 ASSESSMENT — ENCOUNTER SYMPTOMS
OCCASIONAL FEELINGS OF UNSTEADINESS: 0
DEPRESSION: 0
LOSS OF SENSATION IN FEET: 0

## 2024-08-28 ASSESSMENT — PAIN SCALES - GENERAL: PAINLEVEL: 0-NO PAIN

## 2024-09-12 ENCOUNTER — TELEPHONE (OUTPATIENT)
Dept: PRIMARY CARE | Facility: CLINIC | Age: 79
End: 2024-09-12
Payer: COMMERCIAL

## 2024-09-12 NOTE — TELEPHONE ENCOUNTER
11/11/2022  Location: ED FTD/FTD  Chief Complaint   Patient presents with   • Head Injury Without LOC     Pt in ed for  a fall in July, hit occipital part of head, and now 2 weeks ago, she sustained another mechanical fall out of bed, re injuring the same part of head, no loc, but complains of increasing headaches       HPI:  Gunjan Morocho is a(n) 78 year old female with PMHx HLD, prediabetes presenting to the ED for occipital headache x2 weeks s/p mechanical fall. Patient states she recently switched to a smaller bed and accidentally rolled off the bed, hitting her head. She states she's had a headache since, gradually worsening. She also reports poor memory and intermittent room-spinning dizziness lasting for a few seconds, none at time of evaluation. Has no taken any medications at home for her symptoms. Denies changes in vision, nausea/vomiting, or difficulty walking. Denies that this it the worst headache of her life.     Past Medical History:   Diagnosis Date   • Alopecia    • Cataract    • Diabetes mellitus (CMS/HCC)    • High cholesterol    • Hyperlipemia      Patient Active Problem List   Diagnosis   • Hyperlipemia   • Benign paroxysmal positional vertigo   • Cerebral aneurysm without rupture   • Torticollis, spasmodic   • Contusion of right knee   • Pain in joint involving ankle and foot   • Pain in right knee   • Impacted cerumen of left ear   • Localized osteoporosis without current pathological fracture     Past Surgical History:   Procedure Laterality Date   • Cataract extraction w/  intraocular lens implant     • Eye surgery     • Hernia repair     • Hysterectomy     • Vaginal hysterectomy  1989     No family history on file.  Social History     Socioeconomic History   • Marital status: /Civil Union     Spouse name: Not on file   • Number of children: Not on file   • Years of education: Not on file   • Highest education level: Not on file   Occupational History   • Not on file   Tobacco  Pt has an appt in October and his cardiologist advised for pt to call Dr. Castellano to have order for his lipids checked. Call w/questions   Use   • Smoking status: Never Smoker   • Smokeless tobacco: Never Used   Vaping Use   • Vaping Use: never used   Substance and Sexual Activity   • Alcohol use: No   • Drug use: No   • Sexual activity: Never   Other Topics Concern   • Not on file   Social History Narrative   • Not on file     Social Determinants of Health     Financial Resource Strain: Not on file   Food Insecurity: Not on file   Transportation Needs: Not on file   Physical Activity: Not on file   Stress: Not on file   Social Connections: Not on file   Intimate Partner Violence: Not on file       Review of Systems   Constitutional: Negative for chills and fever.   HENT: Negative for congestion and sore throat.    Eyes: Negative for pain and visual disturbance.   Respiratory: Negative for cough and shortness of breath.    Cardiovascular: Negative for chest pain and palpitations.   Gastrointestinal: Negative for abdominal pain, nausea and vomiting.   Genitourinary: Negative for dysuria and hematuria.   Musculoskeletal: Negative for arthralgias and back pain.   Skin: Negative for rash and wound.   Neurological: Positive for dizziness and headaches.         Physical Exam  Vitals and nursing note reviewed.   Constitutional:       General: She is not in acute distress.     Appearance: Normal appearance. She is not toxic-appearing.   HENT:      Head: Normocephalic and atraumatic.      Nose: Nose normal. No congestion.      Mouth/Throat:      Mouth: Mucous membranes are moist.      Pharynx: Oropharynx is clear.   Eyes:      Extraocular Movements: Extraocular movements intact.      Conjunctiva/sclera: Conjunctivae normal.      Pupils: Pupils are equal, round, and reactive to light.   Cardiovascular:      Rate and Rhythm: Normal rate and regular rhythm.      Pulses: Normal pulses.      Heart sounds: Normal heart sounds.   Pulmonary:      Effort: Pulmonary effort is normal.      Breath sounds: Normal breath sounds.   Abdominal:      General: Abdomen is flat.       Palpations: Abdomen is soft.      Tenderness: There is no abdominal tenderness.   Musculoskeletal:         General: No swelling or deformity.      Cervical back: Normal range of motion and neck supple.   Skin:     General: Skin is warm and dry.      Capillary Refill: Capillary refill takes less than 2 seconds.   Neurological:      General: No focal deficit present.      Mental Status: She is alert and oriented to person, place, and time.      Cranial Nerves: No cranial nerve deficit.      Sensory: No sensory deficit.      Motor: No weakness.      Gait: Gait normal.      Comments: Normal speech, following commands, answering questions appropriately, moving all extremities without difficulty       Visit Vitals  /65   Pulse 68   Temp 97.2 °F (36.2 °C)   Resp 16   Wt 55 kg (121 lb 4.1 oz)   SpO2 98%   BMI 24.49 kg/m²         Labs Reviewed   COMPREHENSIVE METABOLIC PANEL - Abnormal; Notable for the following components:       Result Value    Glucose 109 (*)     Alkaline Phosphatase 134 (*)     All other components within normal limits   CBC WITH DIFFERENTIAL    Narrative:     The following orders were created for panel order CBC with Automated Differential.  Procedure                               Abnormality         Status                     ---------                               -----------         ------                     CBC with Automated Dif...[[id number]]                      Final result                 Please view results for these tests on the individual orders.   CBC WITH AUTOMATED DIFFERENTIAL (PERFORMABLE ONLY)    Narrative:     This is an appended report.  These results have been appended to a previously verified report.   URINALYSIS & REFLEX MICROSCOPY WITH CULTURE IF INDICATED       CT HEAD WO CONTRAST   Final Result      No CT evidence for an acute intracranial process.      Electronically Signed by: ELVIS BASS M.D.    Signed on: 11/10/2022 9:20 PM              MDM:  Overall well-appearing  78 year old female with PMHx HLD, prediabetes presenting to the ED with headache s/p mechanical fall consistent with post-concussive syndrome.  History and physical exam findings as noted above.  VSS. Neurologically intact, no focal deficits. EP interp: CT scans pending radiology for final interpretation, independently reviewed and interpreted by myself without sign of acute ICH     CBC CMP wnl.  Not clinically consistent with meningitis, afebrile, no systemic signs, no meningismus, neck is supple, normal ROM.  Discussed results with the patient, provided reassurance and strict return precautions.  Prescribed Tylenol and ibuprofen as needed for headache.  Advised limited screen time and reading.  She expressed understand and agrees with the plan, will f/u with PCP on an outpatient basis.  See ED course for remainder of MDM.    Medications   acetaminophen (TYLENOL) tablet 650 mg (0 mg Oral Held 11/10/22 1953)            Procedures    Impression:  Post concussive syndrome  (primary encounter diagnosis)  Intractable post-traumatic headache, unspecified chronicity pattern      Disposition        Discharge 11/11/2022  2:24 AM  Gunjan Morocho discharge to home/self care.        Patience Quezada MD  Emergency Medicine  PGY-1       Patience Quezada MD  Resident  11/11/22 4113    Patient was seen in conjunction with the resident physician in a supervisory role.  I performed an independent evaluation including history and physical and procedures as documented and agree with the plan of care.      Eugene Reza MD  11/11/22 3118

## 2024-09-17 DIAGNOSIS — E78.5 DYSLIPIDEMIA, GOAL LDL BELOW 70: ICD-10-CM

## 2024-09-17 DIAGNOSIS — E55.9 VITAMIN D DEFICIENCY: Primary | ICD-10-CM

## 2024-09-17 DIAGNOSIS — I10 ESSENTIAL HYPERTENSION WITH GOAL BLOOD PRESSURE LESS THAN 130/85: ICD-10-CM

## 2024-09-23 ENCOUNTER — LAB (OUTPATIENT)
Dept: LAB | Facility: LAB | Age: 79
End: 2024-09-23
Payer: COMMERCIAL

## 2024-09-23 DIAGNOSIS — I10 ESSENTIAL HYPERTENSION WITH GOAL BLOOD PRESSURE LESS THAN 130/85: ICD-10-CM

## 2024-09-23 DIAGNOSIS — E55.9 VITAMIN D DEFICIENCY: ICD-10-CM

## 2024-09-23 DIAGNOSIS — E78.5 DYSLIPIDEMIA, GOAL LDL BELOW 70: ICD-10-CM

## 2024-09-23 LAB
25(OH)D3 SERPL-MCNC: 59 NG/ML (ref 30–100)
ALT SERPL W P-5'-P-CCNC: 18 U/L (ref 10–52)
ANION GAP SERPL CALC-SCNC: 10 MMOL/L (ref 10–20)
BUN SERPL-MCNC: 15 MG/DL (ref 6–23)
CALCIUM SERPL-MCNC: 9.9 MG/DL (ref 8.6–10.3)
CHLORIDE SERPL-SCNC: 105 MMOL/L (ref 98–107)
CHOLEST SERPL-MCNC: 159 MG/DL (ref 0–199)
CHOLESTEROL/HDL RATIO: 2.4
CO2 SERPL-SCNC: 29 MMOL/L (ref 21–32)
CREAT SERPL-MCNC: 0.84 MG/DL (ref 0.5–1.3)
EGFRCR SERPLBLD CKD-EPI 2021: 90 ML/MIN/1.73M*2
GLUCOSE SERPL-MCNC: 96 MG/DL (ref 74–99)
HDLC SERPL-MCNC: 66.5 MG/DL
LDLC SERPL CALC-MCNC: 64 MG/DL
NON HDL CHOLESTEROL: 93 MG/DL (ref 0–149)
POTASSIUM SERPL-SCNC: 4.1 MMOL/L (ref 3.5–5.3)
SODIUM SERPL-SCNC: 140 MMOL/L (ref 136–145)
TRIGL SERPL-MCNC: 145 MG/DL (ref 0–149)
VLDL: 29 MG/DL (ref 0–40)

## 2024-09-24 NOTE — RESULT ENCOUNTER NOTE
Results reviewed. No urgent findings.  Will Review results in detail at upcoming office appointment scheduled soon.      Oral Castellano MD

## 2024-10-11 ENCOUNTER — APPOINTMENT (OUTPATIENT)
Dept: PRIMARY CARE | Facility: CLINIC | Age: 79
End: 2024-10-11
Payer: MEDICARE

## 2024-10-11 VITALS
HEIGHT: 69 IN | SYSTOLIC BLOOD PRESSURE: 115 MMHG | BODY MASS INDEX: 29.92 KG/M2 | HEART RATE: 71 BPM | WEIGHT: 202 LBS | DIASTOLIC BLOOD PRESSURE: 68 MMHG | OXYGEN SATURATION: 95 %

## 2024-10-11 DIAGNOSIS — I10 ESSENTIAL HYPERTENSION WITH GOAL BLOOD PRESSURE LESS THAN 130/85: ICD-10-CM

## 2024-10-11 DIAGNOSIS — Z23 IMMUNIZATION DUE: Primary | ICD-10-CM

## 2024-10-11 DIAGNOSIS — Z95.5 PRESENCE OF DRUG COATED STENT IN LAD CORONARY ARTERY: ICD-10-CM

## 2024-10-11 DIAGNOSIS — Z00.00 ROUTINE GENERAL MEDICAL EXAMINATION AT HEALTH CARE FACILITY: Chronic | ICD-10-CM

## 2024-10-11 DIAGNOSIS — G47.33 OBSTRUCTIVE SLEEP APNEA ON CPAP: ICD-10-CM

## 2024-10-11 DIAGNOSIS — D12.6 ADENOMATOUS POLYP OF COLON, UNSPECIFIED PART OF COLON: ICD-10-CM

## 2024-10-11 DIAGNOSIS — Z98.890 S/P RIGHT KNEE ARTHROSCOPY: ICD-10-CM

## 2024-10-11 DIAGNOSIS — E55.9 VITAMIN D DEFICIENCY: ICD-10-CM

## 2024-10-11 DIAGNOSIS — Z97.3 WEARS GLASSES: ICD-10-CM

## 2024-10-11 DIAGNOSIS — E78.5 DYSLIPIDEMIA, GOAL LDL BELOW 70: ICD-10-CM

## 2024-10-11 DIAGNOSIS — I25.118 CORONARY ARTERY DISEASE OF NATIVE ARTERY OF NATIVE HEART WITH STABLE ANGINA PECTORIS: ICD-10-CM

## 2024-10-11 DIAGNOSIS — J30.1 SEASONAL ALLERGIC RHINITIS DUE TO POLLEN: ICD-10-CM

## 2024-10-11 DIAGNOSIS — M15.9 OSTEOARTHRITIS OF MULTIPLE JOINTS, UNSPECIFIED OSTEOARTHRITIS TYPE: ICD-10-CM

## 2024-10-11 ASSESSMENT — COLUMBIA-SUICIDE SEVERITY RATING SCALE - C-SSRS
2. HAVE YOU ACTUALLY HAD ANY THOUGHTS OF KILLING YOURSELF?: NO
1. IN THE PAST MONTH, HAVE YOU WISHED YOU WERE DEAD OR WISHED YOU COULD GO TO SLEEP AND NOT WAKE UP?: NO
6. HAVE YOU EVER DONE ANYTHING, STARTED TO DO ANYTHING, OR PREPARED TO DO ANYTHING TO END YOUR LIFE?: NO

## 2024-10-11 NOTE — PROGRESS NOTES
"Subjective   Patient ID: Ebenezer Campbell is a 78 y.o. male who presents for Follow-up (Pt is here for F/U ).    Here for follow-up  For the most part doing well  No recent illnesses or injuries  He did get COVID late June after an [place] cruise with his daughter  He has been trying to eat better-and is pleased with his weight loss  No exertional chest pain, palpitations, dizziness, orthopnea or pedal edema.  Right knee arthroscopy early May-with good outcome       Current Outpatient Medications   Medication Instructions    atorvastatin (Lipitor) 80 mg tablet TAKE 1 TABLET BY MOUTH EVERY DAY FOR HIGH CHOLESTEROL    cetirizine (ZYRTEC) 10 mg, oral, Daily PRN    cholecalciferol (Vitamin D-3) 50 mcg (2,000 unit) capsule 1 capsule, oral, Daily    fluticasone (Flonase) 50 mcg/actuation nasal spray 1 spray, nasal, 2 times daily    losartan (COZAAR) 50 mg, oral, Daily    MULTIVITAMIN ORAL 1 capsule, oral, Daily    omeprazole (PRILOSEC) 40 mg, oral, Daily before breakfast    tamsulosin (FLOMAX) 0.4 mg, oral, Daily, For prostate         Review of Systems    Objective   /68   Pulse 71   Ht 1.74 m (5' 8.5\")   Wt 91.6 kg (202 lb)   SpO2 95%   BMI 30.27 kg/m²     Physical Exam    Assessment/Plan   Problem List Items Addressed This Visit             ICD-10-CM    Adenomatous colon polyp D12.6    Coronary artery disease of native heart with stable angina pectoris I25.118    Allergic rhinitis J30.9    Essential hypertension with goal blood pressure less than 130/85 I10    Dyslipidemia, goal LDL below 70 E78.5    Obstructive sleep apnea on CPAP G47.33    Osteoarthritis M19.90    Presence of drug coated stent in LAD coronary artery Z95.5    Wears glasses Z97.3    Vitamin D deficiency E55.9    S/P right knee arthroscopy Z98.890     Other Visit Diagnoses         Codes    Immunization due    -  Primary Z23    Routine general medical examination at health care facility  (Chronic)    Z00.00              Lab on 09/23/2024 "   Component Date Value Ref Range Status    Glucose 09/23/2024 96  74 - 99 mg/dL Final    Sodium 09/23/2024 140  136 - 145 mmol/L Final    Potassium 09/23/2024 4.1  3.5 - 5.3 mmol/L Final    Chloride 09/23/2024 105  98 - 107 mmol/L Final    Bicarbonate 09/23/2024 29  21 - 32 mmol/L Final    Anion Gap 09/23/2024 10  10 - 20 mmol/L Final    Urea Nitrogen 09/23/2024 15  6 - 23 mg/dL Final    Creatinine 09/23/2024 0.84  0.50 - 1.30 mg/dL Final    eGFR 09/23/2024 90  >60 mL/min/1.73m*2 Final    Calculations of estimated GFR are performed using the 2021 CKD-EPI Study Refit equation without the race variable for the IDMS-Traceable creatinine methods.  https://jasn.asnjournals.org/content/early/2021/09/22/ASN.1717129764    Calcium 09/23/2024 9.9  8.6 - 10.3 mg/dL Final    Cholesterol 09/23/2024 159  0 - 199 mg/dL Final          Age      Desirable   Borderline High   High     0-19 Y     0 - 169       170 - 199     >/= 200    20-24 Y     0 - 189       190 - 224     >/= 225         >24 Y     0 - 199       200 - 239     >/= 240   **All ranges are based on fasting samples. Specific   therapeutic targets will vary based on patient-specific   cardiac risk.    Pediatric guidelines reference:Pediatrics 2011, 128(S5).Adult guidelines reference: NCEP ATPIII Guidelines,SUJATA 2001, 258:2486-97    Venipuncture immediately after or during the administration of Metamizole may lead to falsely low results. Testing should be performed immediately prior to Metamizole dosing.    HDL-Cholesterol 09/23/2024 66.5  mg/dL Final      Age       Very Low   Low     Normal    High    0-19 Y    < 35      < 40     40-45     ----  20-24 Y    ----     < 40      >45      ----        >24 Y      ----     < 40     40-60      >60      Cholesterol/HDL Ratio 09/23/2024 2.4   Final      Ref Values  Desirable  < 3.4  High Risk  > 5.0    LDL Calculated 09/23/2024 64  <=99 mg/dL Final                                Near   Borderline      AGE      Desirable  Optimal     High     High     Very High     0-19 Y     0 - 109     ---    110-129   >/= 130     ----    20-24 Y     0 - 119     ---    120-159   >/= 160     ----      >24 Y     0 -  99   100-129  130-159   160-189     >/=190      VLDL 09/23/2024 29  0 - 40 mg/dL Final    Triglycerides 09/23/2024 145  0 - 149 mg/dL Final       Age         Desirable   Borderline High   High     Very High   0 D-90 D    19 - 174         ----         ----        ----  91 D- 9 Y     0 -  74        75 -  99     >/= 100      ----    10-19 Y     0 -  89        90 - 129     >/= 130      ----    20-24 Y     0 - 114       115 - 149     >/= 150      ----         >24 Y     0 - 149       150 - 199    200- 499    >/= 500    Venipuncture immediately after or during the administration of Metamizole may lead to falsely low results. Testing should be performed immediately prior to Metamizole dosing.    Non HDL Cholesterol 09/23/2024 93  0 - 149 mg/dL Final          Age       Desirable   Borderline High   High     Very High     0-19 Y     0 - 119       120 - 144     >/= 145    >/= 160    20-24 Y     0 - 149       150 - 189     >/= 190      ----         >24 Y    30 mg/dL above LDL Cholesterol goal      ALT 09/23/2024 18  10 - 52 U/L Final    Patients treated with Sulfasalazine may generate falsely decreased results for ALT.    Vitamin D, 25-Hydroxy, Total 09/23/2024 59  30 - 100 ng/mL Final          Portions of this encounter note have been copied from my previous note dated 4/9/24  , which have been updated where appropriate and all reflect my current medical decision making from today.       Living situation-he lives alone, independently still drives. Bedroom and laundry on 1st floor, but has an upstairs and basement.  His children are out of town.             He enjoys traveling, and now enjoys the companionship of a friend through the week         Coronary artery disease status post cardiac stent placed 11/13/2020- he has done well following the procedure. At  this point cardiac rehab has not deemed to be necessary he states.     widened aortic arch            He will continue his elliptical workouts and follow-up with his cardiologist, Dr. Sethi as directed                now annually-next appointment 8/25        Hypertension- BP improved w/ losartan. He will continue healthy lifestyle efforts    Edema-encourage low salt diet along with knee-high compression hose such as Dr. Thurman or some other similar brand     Elevated weight w/ BMI over 30- He will continue efforts accordingly. Unfortunately BMI remains in the 31 range. He will continue to work to improve this. He has been traveling a lot since his wife passed away. He will work towards weight loss in the new year.           4/24-BMI 33.3.  I informed patient BMI> 30, w/ recommendations for nutrition and exercise plan to help achieve weight reduction.  We spoke at length regarding measures to help improve weight with calorie reduction and more exercise as the foundation of an ongoing long-term weight loss plan.            10/24-with diet changes, his weight is down 17 pounds in 6 months.  BMI 30.3.  He will continue ongoing weight loss efforts accordingly towards a long-term weight reduction plan.       Dyslipidemia he will continue the maximum atorvastatin following his cardiac stent placement.  Goal LDL under 70.          11/23.  LDL 61.             Lipids ordered for next time.    Peripheral vascular concerns with Elevated calcium score- carotid study unremarkable. Abdominal aortic aneurysm unremarkable November 2019. Carotid duplex unremarkable November 2020.           Presently asymptomatic without claudication symptoms      Exercise routine-typically on his home recumbent cycle. He golfs- walks 1-2 x wk, and does yardwork as well               Presently back at the [facility] gym 3-4 x wk,  using his [employer] benefit, on  elliptical 4 times a week, for 30 minutes with a pulse goal around 125, walking, and machines  too.       Prostate cancer-treated with radiation therapy and then androgen deprivation therapy.  He is followed by Dr. Rincon in urology.  He is in a clinical trial.  He will continue per protocol.  He also plans to [place] back with his original urologist, Dr. Marin              8/24-PSA remains undetectable.  He will continue to follow with his urologist semiannually.  Scheduled to see his radiation oncology practitioner Yan-11/24-he will do labs before          Disequilibrium-though he has a history of dysfunctional eustachian tubes that's presently not an issue. Encouraged ongoing Flonase and Zyrtec and the antibiotics for the sinusitis. He will use caution with position changes understanding that he could be at risk for falling if he is dizzy. He will notify me with ongoing concerns   Fortunately no balance problems of late     Headache syndrome- His CAT scan was nondiagnostic and cervical spine x-ray does confirm some arthritis. Presently headaches improved, and he canceled his neurology consultation.        Seasonal allergies. Suggested restarting Flonase twice daily and Zyrtec 10 mg daily for any sinus pressure.      S/p right knee arthroscopy early May 2024 with Dr. Otero        Improved presently      Hx left knee arthroscopy/DJD- not problematic with weight loss and elliptical workouts. Doing well.        RE lumbar spasms-this has happened on several instances, with x-ray showing lumbar arthritis July 2015 and again in November 2009. Without recent trauma we'll defer x-rays presently. Instructions written out to optimize compliance. He will use meloxicam for 7-10 days as the anti-inflammatory, Skelaxin nightly for a week or so for the muscle relaxer. Heating pad and stretching twice daily for 30 minutes recommended and follow-up with physical therapy to optimize long-term stretching routine. He will call if not improved            No back pain w/ weights routine          Osteoarthritis-advanced  "arthritic changes in his hands.  He will use caution with overuse     Cervical spasms-mainly left-sided-with some tension headaches left lateral lumbar spasms-including encouraged heating pad and stretch.     Right heel discomfort-improved presently. He did have an MRI- showing Achilles tendinitis among other findings-residual changes-he remembers he tore his Achilles age 24 in the right side.              10/23-he has had more heel pain posteriorly recently-discussed the possibility of bursitis.  He will [place] back with podiatry-Dr. Tellez       Feet pain-improved with orthotics.      Right shoulder - still occas \"twinge\" w/ certain movements, now daily. He will notify me if he would like further evaluation. Devon declined. Instead he'll do exercises. Fortunately normal range of motion without rotator cuff worrisome findings on exam.                Acid reflux-less problematic with weight loss. Occasionally problematic, with a history of past swallowing issues. EGD 12/21 completed without worrisome findings on biopsy.  He has since discontinued PPI. No active hiatal hernia issues fortunately.            Encouraged him to use the PPI before triggers     Colon polyps - noted on 2/17 colonoscopy, and on 12/19 colonoscopy.Next in 5 yrs - Dec '24    Pilonidal cyst concern-4/24-he will meet with the colorectal team to evaluate further for the occasional pain in his coccyx area     Right inguinal hernia/ventral hernia/history of umbilical hernia repair-not problematic    Erectile dysfunction-sildenafil not that helpful in the past.          4/24-suggested a trial of Cialis         9/24-he did not feel this was helpful       Sleep apnea-now back on CPAP            4/24-I asked him to [place] back with the sleep team for reevaluation             10/24-doing better-he will continue to see his sleep provider        Scalp skin lesions-encouraged dermatology consultation-he plans to see his wife, Dr. Ann     [facility]- " encouraged semiannual dental visits.     Home itching-occasionally at night recently-history and exam nondiagnostic no new medications lotions soaps detergents suggested topical Benadryl and follow-up if not improved              -encouraged cooler showers, unscented Dove soap rather than [place] spring, and Cetaphil lotion daily after shower     Vision care / early cataracts - annual appt soon. He has a Rx for distance. Dr. Rashid recently noted cataracts have advanced a bit. Follow-up peripheral vascular concerns-w/ Dr. Jeremy Narvaez [facility]       Bereavement-support provided regarding the sudden passing of his wife with a dissecting aortic aneurysm [place] at the clinic just before 2021. -He now has 8 grandchildren-living out of town he will continue to travel. Remains busy as he begins to de-cluttering his house-with 6 bedrooms he notes.              Looking forward to the holidays with his family      Travel-he has been able to do more traveling with a [employer]  with his daughter.  Anticipating [place] in the spring 2025 with another daughter.       Living will and medical power of  completed forms reviewed/filed at his  visit   I suggested he update these as his  wife is listed as his primary agent on his medical power of                -these were not located in the new EMR.  Will search the old to get these transferred        High dose flu shot annually after Labor Day- updated 10/23     Shingrix series completed      Covid series completed, booster too- 10/23    RSV - suggested , and again 10/24     Follow-up 6 months, sooner as needed    charting was completed using voice recognition technology and may include unintended errors.     F

## 2024-10-12 PROBLEM — S83.241A OTHER TEAR OF MEDIAL MENISCUS, CURRENT INJURY, RIGHT KNEE, INITIAL ENCOUNTER: Status: RESOLVED | Noted: 2024-04-30 | Resolved: 2024-10-12

## 2024-10-12 PROBLEM — J39.9 UPPER RESPIRATORY DISEASE: Status: RESOLVED | Noted: 2023-04-18 | Resolved: 2024-10-12

## 2024-10-12 PROBLEM — R22.41 MASS OF RIGHT LOWER EXTREMITY: Status: RESOLVED | Noted: 2023-04-18 | Resolved: 2024-10-12

## 2024-10-12 PROBLEM — E87.6 HYPOKALEMIA: Status: RESOLVED | Noted: 2023-04-18 | Resolved: 2024-10-12

## 2024-10-12 PROBLEM — Z98.890 S/P RIGHT KNEE ARTHROSCOPY: Status: ACTIVE | Noted: 2024-10-12

## 2024-10-12 PROBLEM — T81.81XA COMPLICATION OF VENTILATION THERAPY: Status: RESOLVED | Noted: 2023-04-18 | Resolved: 2024-10-12

## 2024-10-15 ENCOUNTER — APPOINTMENT (OUTPATIENT)
Dept: GASTROENTEROLOGY | Facility: CLINIC | Age: 79
End: 2024-10-15
Payer: COMMERCIAL

## 2024-10-15 VITALS
HEIGHT: 69 IN | DIASTOLIC BLOOD PRESSURE: 75 MMHG | BODY MASS INDEX: 30.66 KG/M2 | WEIGHT: 207 LBS | OXYGEN SATURATION: 95 % | SYSTOLIC BLOOD PRESSURE: 118 MMHG | RESPIRATION RATE: 16 BRPM | HEART RATE: 69 BPM

## 2024-10-15 DIAGNOSIS — Z12.11 COLON CANCER SCREENING: ICD-10-CM

## 2024-10-15 DIAGNOSIS — D12.6 TUBULAR ADENOMA OF COLON: Primary | ICD-10-CM

## 2024-10-15 PROCEDURE — 1036F TOBACCO NON-USER: CPT | Performed by: NURSE PRACTITIONER

## 2024-10-15 PROCEDURE — 1157F ADVNC CARE PLAN IN RCRD: CPT | Performed by: NURSE PRACTITIONER

## 2024-10-15 PROCEDURE — 3078F DIAST BP <80 MM HG: CPT | Performed by: NURSE PRACTITIONER

## 2024-10-15 PROCEDURE — 3074F SYST BP LT 130 MM HG: CPT | Performed by: NURSE PRACTITIONER

## 2024-10-15 PROCEDURE — 1123F ACP DISCUSS/DSCN MKR DOCD: CPT | Performed by: NURSE PRACTITIONER

## 2024-10-15 PROCEDURE — 1159F MED LIST DOCD IN RCRD: CPT | Performed by: NURSE PRACTITIONER

## 2024-10-15 PROCEDURE — 99213 OFFICE O/P EST LOW 20 MIN: CPT | Performed by: NURSE PRACTITIONER

## 2024-10-15 RX ORDER — SODIUM, POTASSIUM,MAG SULFATES 17.5-3.13G
SOLUTION, RECONSTITUTED, ORAL ORAL
Qty: 354 ML | Refills: 0 | Status: SHIPPED | OUTPATIENT
Start: 2024-10-15

## 2024-10-15 RX ORDER — ASPIRIN 81 MG/1
81 TABLET ORAL DAILY
COMMUNITY

## 2024-10-15 NOTE — PROGRESS NOTES
Subjective   Patient ID: Ebenezer Campbell is a 78 y.o. male who presents for Colon Cancer Screening (Denies GI sx. Last colonoscopy in . Denies family hx of colon ca.).  HPI  LOV 24:  Oropharyngeal dysphagia to liquids once a week or less since the  of  - improved with drinking slowly and concentrating on swallowing - consider Modified Barium Swallow Study with Speech Therapist if symptoms progress. No history of neurologic disorders.   Constipation - increase dietary fiber and water intake   History of Tubular adenoma of the colon - due for surveillance colonoscopy in 2024       Patient here to schedule a surveillance colonoscopy.  He denies constipation or diarrhea.  No melena or hematochezia.  No bowel habit changes or change in stool caliber.  Patient without any GI complaints at this time.  No weight loss or decreased appetite.  No GERD or dysphagia.    Current Outpatient Medications on File Prior to Visit   Medication Sig Dispense Refill    aspirin 81 mg EC tablet Take 1 tablet (81 mg) by mouth once daily.      atorvastatin (Lipitor) 80 mg tablet TAKE 1 TABLET BY MOUTH EVERY DAY FOR HIGH CHOLESTEROL 90 tablet 3    cetirizine (ZyrTEC) 5 mg tablet Take 2 tablets (10 mg) by mouth once daily as needed for allergies.      cholecalciferol (Vitamin D-3) 50 mcg (2,000 unit) capsule Take 1 capsule (50 mcg) by mouth once daily.      fluticasone (Flonase) 50 mcg/actuation nasal spray Administer 1 spray into affected nostril(s) 2 times a day.      losartan (Cozaar) 50 mg tablet Take 1 tablet (50 mg) by mouth once daily. 90 tablet 3    MULTIVITAMIN ORAL Take 1 capsule by mouth once daily.      omeprazole (PriLOSEC) 40 mg DR capsule TAKE 1 CAPSULE (40 MG) BY MOUTH ONCE DAILY IN THE MORNING. TAKE BEFORE MEALS. 90 capsule 3    tamsulosin (Flomax) 0.4 mg 24 hr capsule Take 1 capsule (0.4 mg) by mouth once daily. For prostate 90 capsule 3    [] respiratory syncytial virus, RSV, vaccine,  "adjuvanted, age 60y+ (Arexvy) 120 mcg/0.5 mL suspension for reconstitution Inject 0.5 mL into the muscle 1 time for 1 dose. 0.5 mL 0    [DISCONTINUED] tadalafil (Cialis) 10 mg tablet 1/2 or 1 tablet daily as needed for erectile dysfunction 12 tablet 11     No current facility-administered medications on file prior to visit.        Review of Systems   All other systems reviewed and are negative.      Objective   Physical Exam  Vitals reviewed.   Constitutional:       General: He is awake. He is not in acute distress.     Appearance: Normal appearance. He is not ill-appearing, toxic-appearing or diaphoretic.   HENT:      Head: Normocephalic and atraumatic.   Eyes:      General: No scleral icterus.     Pupils: Pupils are equal, round, and reactive to light.   Cardiovascular:      Rate and Rhythm: Normal rate and regular rhythm.      Heart sounds: Normal heart sounds. No murmur heard.  Pulmonary:      Effort: Pulmonary effort is normal.      Breath sounds: Normal breath sounds. No wheezing.   Abdominal:      General: Abdomen is protuberant.   Musculoskeletal:         General: Normal range of motion.      Cervical back: Normal range of motion.      Right lower leg: No edema.      Left lower leg: No edema.   Skin:     General: Skin is warm and dry.      Coloration: Skin is not jaundiced or pale.      Findings: No bruising.   Neurological:      General: No focal deficit present.      Mental Status: He is alert and oriented to person, place, and time.      Motor: No weakness.      Gait: Gait normal.   Psychiatric:         Mood and Affect: Mood normal.         Behavior: Behavior normal. Behavior is cooperative.         Thought Content: Thought content normal.         Judgment: Judgment normal.       /75   Pulse 69   Resp 16   Ht 1.74 m (5' 8.5\")   Wt 93.9 kg (207 lb)   SpO2 95%   BMI 31.02 kg/m²      Lab Results   Component Value Date    WBC 4.6 08/23/2024    HGB 12.6 (L) 08/23/2024    HCT 38.6 (L) 08/23/2024    " MCV 96 08/23/2024     08/23/2024       Lab Results   Component Value Date    TSH 2.62 11/15/2023         Assessment/Plan   Diagnoses and all orders for this visit:  Tubular adenoma of colon  -     Colonoscopy Screening; High Risk Patient; tubular adenoma; Future  -     sodium,potassium,mag sulfates (Suprep) 17.5-3.13-1.6 gram recon soln solution; Take one bottle twice as directed by the prep instructions  Colon cancer screening  -     Colonoscopy Screening; High Risk Patient; tubular adenoma; Future  -     sodium,potassium,mag sulfates (Suprep) 17.5-3.13-1.6 gram recon soln solution; Take one bottle twice as directed by the prep instructions  78-year-old male here for surveillance colonoscopy.  No red flag symptoms noted.  The procedure was discussed at length, including all possible risks.  Importance of the prep was stressed to the patient for optimal results.       Sherie Smith CNP

## 2024-10-21 ENCOUNTER — APPOINTMENT (OUTPATIENT)
Dept: PRIMARY CARE | Facility: CLINIC | Age: 79
End: 2024-10-21
Payer: MEDICARE

## 2024-10-28 ENCOUNTER — TELEPHONE (OUTPATIENT)
Dept: GASTROENTEROLOGY | Facility: CLINIC | Age: 79
End: 2024-10-28
Payer: COMMERCIAL

## 2024-11-08 ENCOUNTER — OFFICE VISIT (OUTPATIENT)
Dept: URGENT CARE | Age: 79
End: 2024-11-08
Payer: COMMERCIAL

## 2024-11-08 VITALS
OXYGEN SATURATION: 97 % | DIASTOLIC BLOOD PRESSURE: 75 MMHG | TEMPERATURE: 98 F | HEART RATE: 76 BPM | HEIGHT: 69 IN | RESPIRATION RATE: 18 BRPM | WEIGHT: 198 LBS | BODY MASS INDEX: 29.33 KG/M2 | SYSTOLIC BLOOD PRESSURE: 132 MMHG

## 2024-11-08 DIAGNOSIS — S61.210A LACERATION OF RIGHT INDEX FINGER WITHOUT FOREIGN BODY WITHOUT DAMAGE TO NAIL, INITIAL ENCOUNTER: Primary | ICD-10-CM

## 2024-11-08 ASSESSMENT — PAIN SCALES - GENERAL: PAINLEVEL_OUTOF10: 2

## 2024-11-08 NOTE — PROGRESS NOTES
Subjective   Patient ID: Ebenezer Campbell is a 79 y.o. male. He presents today with a chief complaint of Laceration (Right index finger ). He was performing a  ceremony; he dropped a cross onto his hand, cut his right index finger with the cross. He denies associated numbness, weakness and tingling. He is up to date on Tetanus vaccination.    History of Present Illness    Laceration      Past Medical History  Allergies as of 2024    (No Known Allergies)       (Not in a hospital admission)       Past Medical History:   Diagnosis Date    Contact with and (suspected) exposure to other viral communicable diseases 2014    Exposure to influenza    Dysphagia, unspecified 2013    Difficulty swallowing    Early cataracts, bilateral 2023    Hypokalemia 2023    Impacted cerumen, right ear 2017    Impacted cerumen of right ear    Obstructive sleep apnea (adult) (pediatric) 10/28/2015    Obstructive sleep apnea on CPAP    Other conditions influencing health status     Nephrolithiasis    Other enthesopathies, not elsewhere classified     Tendonitis of shoulder    Other fatigue     Tired    Other fracture of first lumbar vertebra, initial encounter for closed fracture (Multi)     Closed fracture of L1 vertebra    Other tear of medial meniscus, current injury, right knee, initial encounter 2024    Personal history of other diseases of the circulatory system 10/01/2015    History of hypertension    Personal history of other diseases of the musculoskeletal system and connective tissue 2014    History of low back pain    Personal history of other diseases of the nervous system and sense organs 2015    History of sleep apnea    Personal history of other diseases of the respiratory system     History of allergic rhinitis    Personal history of other diseases of urinary system 10/28/2015    History of hematuria    Personal history of other specified conditions     History of  "edema    Residual hemorrhoidal skin tags 04/23/2014    External hemorrhoids    Snoring 05/16/2014    Snoring    Sprain of joints and ligaments of unspecified parts of neck, initial encounter 12/19/2014    Neck sprain    Unspecified visual loss     Vision problem    Vitamin D deficiency, unspecified     Vitamin D deficiency       Past Surgical History:   Procedure Laterality Date    COLONOSCOPY  11/28/2012    Complete Colonoscopy    ESOPHAGOGASTRODUODENOSCOPY  10/28/2016    Esophagogastroduodenoscopy With Biopsy    KNEE ARTHROSCOPY W/ DEBRIDEMENT  04/23/2014    Arthroscopy Knee Left    OTHER SURGICAL HISTORY  01/22/2020    Cataract surgery    OTHER SURGICAL HISTORY  02/04/2022    Esophagogastroduodenoscopy    OTHER SURGICAL HISTORY  11/19/2021    Cardiac catheterization with stent placement    UMBILICAL HERNIA REPAIR  11/28/2012    Umbilical Hernia Repair        reports that he has never smoked. He has never used smokeless tobacco. He reports current alcohol use. He reports that he does not use drugs.    Review of Systems  Review of Systems                               Objective    Vitals:    11/08/24 1215   BP: 132/75   BP Location: Left arm   Patient Position: Sitting   BP Cuff Size: Large adult   Pulse: 76   Resp: 18   Temp: 36.7 °C (98 °F)   SpO2: 97%   Weight: 89.8 kg (198 lb)   Height: 1.753 m (5' 9\")     No LMP for male patient.    Physical Exam    Laceration Repair    Date/Time: 11/8/2024 1:02 PM    Performed by: Rashida Lynch MD  Authorized by: Rashida Lynch MD    Consent:     Consent obtained:  Verbal    Consent given by:  Patient    Risks, benefits, and alternatives were discussed: yes      Risks discussed:  Infection, pain, need for additional repair and poor wound healing    Alternatives discussed:  No treatment and delayed treatment  Universal protocol:     Procedure explained and questions answered to patient or proxy's satisfaction: yes      Site/side marked: yes      Immediately prior to " procedure, a time out was called: yes      Patient identity confirmed:  Verbally with patient  Anesthesia:     Anesthesia method:  Local infiltration    Local anesthetic:  Lidocaine 2% w/o epi  Laceration details:     Location:  Finger    Finger location:  R index finger    Length (cm):  5    Depth (mm):  2  Pre-procedure details:     Preparation:  Patient was prepped and draped in usual sterile fashion  Exploration:     Limited defect created (wound extended): no      Hemostasis achieved with:  Direct pressure    Imaging outcome: foreign body not noted      Wound exploration: wound explored through full range of motion and entire depth of wound visualized      Wound extent: no foreign bodies/material noted, no muscle damage noted, no nerve damage noted, no tendon damage noted and no vascular damage noted      Contaminated: no    Treatment:     Area cleansed with:  Saline    Amount of cleaning:  Standard    Irrigation solution:  Sterile saline    Visualized foreign bodies/material removed: no      Debridement:  None    Undermining:  None    Scar revision: no    Skin repair:     Repair method:  Sutures    Suture size:  4-0    Suture material:  Prolene    Suture technique:  Simple interrupted    Number of sutures:  7  Approximation:     Approximation:  Close  Repair type:     Repair type:  Simple  Post-procedure details:     Dressing:  Antibiotic ointment and non-adherent dressing    Procedure completion:  Tolerated well, no immediate complications      Point of Care Test & Imaging Results from this visit  No results found for this visit on 11/08/24.   No results found.    Diagnostic study results (if any) were reviewed by Rashida Lynch MD.    Assessment/Plan   Allergies, medications, history, and pertinent labs/EKGs/Imaging reviewed by Rashida Lynch MD.       Orders and Diagnoses  There are no diagnoses linked to this encounter.    Medical Admin Record      Patient disposition: Home    Electronically signed by  Rashida Lynch MD  1:00 PM

## 2024-11-08 NOTE — PATIENT INSTRUCTIONS
Cleanse gently with antibacterial soap and water twice daily; keep covered with antibiotic ointment and dressing  Follow up for suture removal in 7-10 days.

## 2024-11-09 ENCOUNTER — LAB (OUTPATIENT)
Dept: LAB | Facility: LAB | Age: 79
End: 2024-11-09
Payer: COMMERCIAL

## 2024-11-09 DIAGNOSIS — C61 PROSTATE CANCER (MULTI): ICD-10-CM

## 2024-11-09 LAB
ALBUMIN SERPL BCP-MCNC: 4.1 G/DL (ref 3.4–5)
ALP SERPL-CCNC: 98 U/L (ref 33–136)
ALT SERPL W P-5'-P-CCNC: 17 U/L (ref 10–52)
ANION GAP SERPL CALC-SCNC: 11 MMOL/L (ref 10–20)
AST SERPL W P-5'-P-CCNC: 17 U/L (ref 9–39)
BASOPHILS # BLD AUTO: 0.05 X10*3/UL (ref 0–0.1)
BASOPHILS NFR BLD AUTO: 0.8 %
BILIRUB SERPL-MCNC: 0.4 MG/DL (ref 0–1.2)
BUN SERPL-MCNC: 23 MG/DL (ref 6–23)
CALCIUM SERPL-MCNC: 9.4 MG/DL (ref 8.6–10.3)
CHLORIDE SERPL-SCNC: 106 MMOL/L (ref 98–107)
CO2 SERPL-SCNC: 31 MMOL/L (ref 21–32)
CREAT SERPL-MCNC: 0.77 MG/DL (ref 0.5–1.3)
EGFRCR SERPLBLD CKD-EPI 2021: >90 ML/MIN/1.73M*2
EOSINOPHIL # BLD AUTO: 0.23 X10*3/UL (ref 0–0.4)
EOSINOPHIL NFR BLD AUTO: 3.9 %
ERYTHROCYTE [DISTWIDTH] IN BLOOD BY AUTOMATED COUNT: 14.7 % (ref 11.5–14.5)
GLUCOSE SERPL-MCNC: 89 MG/DL (ref 74–99)
HCT VFR BLD AUTO: 40.4 % (ref 41–52)
HGB BLD-MCNC: 13.3 G/DL (ref 13.5–17.5)
IMM GRANULOCYTES # BLD AUTO: 0.02 X10*3/UL (ref 0–0.5)
IMM GRANULOCYTES NFR BLD AUTO: 0.3 % (ref 0–0.9)
LYMPHOCYTES # BLD AUTO: 1.6 X10*3/UL (ref 0.8–3)
LYMPHOCYTES NFR BLD AUTO: 26.9 %
MCH RBC QN AUTO: 31.4 PG (ref 26–34)
MCHC RBC AUTO-ENTMCNC: 32.9 G/DL (ref 32–36)
MCV RBC AUTO: 96 FL (ref 80–100)
MONOCYTES # BLD AUTO: 0.57 X10*3/UL (ref 0.05–0.8)
MONOCYTES NFR BLD AUTO: 9.6 %
NEUTROPHILS # BLD AUTO: 3.48 X10*3/UL (ref 1.6–5.5)
NEUTROPHILS NFR BLD AUTO: 58.5 %
NRBC BLD-RTO: 0 /100 WBCS (ref 0–0)
PLATELET # BLD AUTO: 252 X10*3/UL (ref 150–450)
POTASSIUM SERPL-SCNC: 4.5 MMOL/L (ref 3.5–5.3)
PROT SERPL-MCNC: 6.3 G/DL (ref 6.4–8.2)
RBC # BLD AUTO: 4.23 X10*6/UL (ref 4.5–5.9)
SODIUM SERPL-SCNC: 143 MMOL/L (ref 136–145)
TESTOST SERPL-MCNC: 411 NG/DL (ref 240–1000)
WBC # BLD AUTO: 6 X10*3/UL (ref 4.4–11.3)

## 2024-11-12 LAB
PSA FREE MFR SERPL: <50 %
PSA FREE SERPL-MCNC: <0.1 NG/ML
PSA SERPL IA-MCNC: 0.2 NG/ML (ref 0–4)

## 2024-11-13 ENCOUNTER — APPOINTMENT (OUTPATIENT)
Dept: GASTROENTEROLOGY | Facility: EXTERNAL LOCATION | Age: 79
End: 2024-11-13
Payer: COMMERCIAL

## 2024-11-13 VITALS
SYSTOLIC BLOOD PRESSURE: 120 MMHG | RESPIRATION RATE: 10 BRPM | DIASTOLIC BLOOD PRESSURE: 67 MMHG | HEART RATE: 63 BPM | TEMPERATURE: 97.9 F | OXYGEN SATURATION: 99 %

## 2024-11-13 DIAGNOSIS — Z12.11 COLON CANCER SCREENING: ICD-10-CM

## 2024-11-13 DIAGNOSIS — D12.6 TUBULAR ADENOMA OF COLON: ICD-10-CM

## 2024-11-13 RX ORDER — ONDANSETRON HYDROCHLORIDE 2 MG/ML
4 INJECTION, SOLUTION INTRAVENOUS ONCE AS NEEDED
Status: DISCONTINUED | OUTPATIENT
Start: 2024-11-13 | End: 2024-11-14 | Stop reason: HOSPADM

## 2024-11-13 ASSESSMENT — PAIN - FUNCTIONAL ASSESSMENT
PAIN_FUNCTIONAL_ASSESSMENT: 0-10
PAIN_FUNCTIONAL_ASSESSMENT: 0-10

## 2024-11-13 ASSESSMENT — PAIN SCALES - GENERAL
PAINLEVEL_OUTOF10: 0 - NO PAIN
PAINLEVEL_OUTOF10: 0 - NO PAIN

## 2024-11-13 NOTE — H&P
Outpatient Hospital Procedure    Patient Profile-Procedures  Initial Info  Patient Demographics  Name Ebenezer Campbell  Date of Birth 1945  MRN [id number]  Address   [address] [place] [id number] [place] [address]    Primary Phone Number [phone]  Secondary Phone Number    [facility]    Procedures   Colonoscopy      Indication:  surveillance    Primary contact name and number   Extended Emergency Contact Information  Primary Emergency Contact: Jessenia Cabral  Home Phone: [phone]  Work Phone: [phone]  Relation: Daughter    General Health  Weight There were no vitals filed for this visit.  BMI There is no height or weight on file to calculate BMI.    Allergies  No Known Allergies    Past Medical History   Past Medical History:   Diagnosis Date    Contact with and (suspected) exposure to other viral communicable diseases 12/30/2014    Exposure to influenza    Dysphagia, unspecified 04/16/2013    Difficulty swallowing    Early cataracts, bilateral 04/18/2023    Hypokalemia 04/18/2023    Impacted cerumen, right ear 02/16/2017    Impacted cerumen of right ear    Obstructive sleep apnea (adult) (pediatric) 10/28/2015    Obstructive sleep apnea on CPAP    Other conditions influencing health status     Nephrolithiasis    Other enthesopathies, not elsewhere classified     Tendonitis of shoulder    Other fatigue     Tired    Other fracture of first lumbar vertebra, initial encounter for closed fracture (Multi)     Closed fracture of L1 vertebra    Other tear of medial meniscus, current injury, right knee, initial encounter 04/30/2024    Personal history of other diseases of the circulatory system 10/01/2015    History of hypertension    Personal history of other diseases of the musculoskeletal system and connective tissue 12/16/2014    History of low back pain    Personal history of other diseases of the nervous system and sense organs 02/06/2015    History of sleep apnea     Personal history of other diseases of the respiratory system     History of allergic rhinitis    Personal history of other diseases of urinary system 10/28/2015    History of hematuria    Personal history of other specified conditions     History of edema    Residual hemorrhoidal skin tags 04/23/2014    External hemorrhoids    Snoring 05/16/2014    Snoring    Sprain of joints and ligaments of unspecified parts of neck, initial encounter 12/19/2014    Neck sprain    Unspecified visual loss     Vision problem    Vitamin D deficiency, unspecified     Vitamin D deficiency       Provider assessment  Diagnosis  Medication Reviewed - yes  Prior to Admission medications    Medication Sig Start Date End Date Taking? Authorizing Provider   aspirin 81 mg EC tablet Take 1 tablet (81 mg) by mouth once daily.   Yes Historical Provider, MD   atorvastatin (Lipitor) 80 mg tablet TAKE 1 TABLET BY MOUTH EVERY DAY FOR HIGH CHOLESTEROL 5/23/24  Yes Oral Castellano MD   cholecalciferol (Vitamin D-3) 50 mcg (2,000 unit) capsule Take 1 capsule (50 mcg) by mouth once daily. 10/2/13  Yes Historical Provider, MD   losartan (Cozaar) 50 mg tablet Take 1 tablet (50 mg) by mouth once daily. 2/27/24 2/26/25 Yes Oral Castellano MD   MULTIVITAMIN ORAL Take 1 capsule by mouth once daily.   Yes Historical Provider, MD   omeprazole (PriLOSEC) 40 mg DR capsule TAKE 1 CAPSULE (40 MG) BY MOUTH ONCE DAILY IN THE MORNING. TAKE BEFORE MEALS. 8/22/24  Yes Oral Castellano MD   tamsulosin (Flomax) 0.4 mg 24 hr capsule Take 1 capsule (0.4 mg) by mouth once daily. For prostate 8/22/24  Yes Oral Castellano MD   cetirizine (ZyrTEC) 5 mg tablet Take 2 tablets (10 mg) by mouth once daily as needed for allergies. 4/18/23   Oral Castellano MD   fluticasone (Flonase) 50 mcg/actuation nasal spray Administer 1 spray into affected nostril(s) 2 times a day. 1/23/12   Historical Provider, MD   sodium,potassium,mag sulfates (Suprep) 17.5-3.13-1.6 gram recon soln solution Take one bottle  twice as directed by the prep instructions 10/15/24 11/13/24  MARLENE Mccormick-CNP       This is my H&P    Physical Exam  Physical Exam  Constitutional:       Comments: Awake   HENT:      Head: Normocephalic.   Cardiovascular:      Rate and Rhythm: Normal rate and regular rhythm.   Pulmonary:      Effort: Pulmonary effort is normal.      Breath sounds: Normal breath sounds.   Abdominal:      General: Bowel sounds are normal.      Palpations: Abdomen is soft.   Neurological:      Mental Status: He is alert.   Psychiatric:         Mood and Affect: Mood normal.           Oropharyngeal Classification II (hard and soft palate, upper portion of tonsils and uvula visible)  ASA PS Classification 2  Sedation Plan NONE  Procedure Plan - pre-procedural (re)assesment completed by physician:  discharge/transfer patient when discharge criteria met    Ayanna Wu MD  11/13/2024 10:20 AM

## 2024-11-13 NOTE — DISCHARGE INSTRUCTIONS
Patient Instructions Post Endoscopy Procedure      The anesthetics, sedatives or narcotics which were given to you today will be acting in your body for the next 24 hours, so you might feel a little sleepy or groggy.  This feeling should slowly wear off. Carefully read and follow the instructions.     You received sedation today:  - Do not drive or operate any machinery or power tools of any kind.   - No alcoholic beverages today, not even beer or wine.  - Do not make any important decisions or sign any legal documents.  - No over the counter medications that contain alcohol or that may cause drowsiness.    While it is common to experience mild to moderate abdominal distention, gas, or belching after your procedure, if any of these symptoms occur following discharge from the GI Lab or within one week of having your procedure, call the [facility] to be advised whether a visit to your nearest Urgent Care or Emergency Department is indicated.  Take this paper with you if you go.   - If you develop an allergic reaction to the medications that were given during your procedure such as difficulty breathing, rash, hives, severe nausea, vomiting or lightheadedness.  - If you experience chest pain, shortness of breath, severe abdominal pain, fevers and chills.  -If you develop signs and symptoms of bleeding such as blood in your spit, if your stools turn black, tarry, or bloody  - If you have not urinated within 8 hours following your procedure.  - If your IV site becomes painful, red, inflamed, or looks infected.    If you received a biopsy/polypectomy the following instructions apply below:  _x_ Do not use non-steroidal medications or anti-coagulants for one week following your procedure. (Examples of these types of medications are: Advil, Arthrotec, Aleve, Coumadin, Ecotrin, Heparin, Ibuprofen, Indocin, Motrin, Naprosyn, Nuprin, Plavix, Vioxx, and Voltarin, or their generic forms.  This list is not  all-inclusive.  Check with your physician or pharmacist before resuming medications.)   _x_ Eat a soft diet today.  Avoid foods that are poorly digested for the next 24 hours.  These foods would include: nuts, beans, lettuce, red meats, and fried foods. Start with liquids and advance your diet as tolerated, gradually work up to eating solids.   __ You can restart your ASA tomorrow  __ You can resume your anticoagulation therapy -     Your physician recommends the additional following instructions:    -You have a contact number available for emergencies. The signs and symptoms of potential delayed complications were discussed with you. You may return to normal activities tomorrow.  -Resume your previous diet or other if specified.  -Continue your present medications.   -We are waiting for your pathology results, if applicable. The results will be available in [facility]. I will send you a message with any recommendations.  -The findings and recommendations have been discussed with you and/or family.  -Please see Medication Reconciliation Form for new medication/medications prescribed.     If you experience any problems or have any questions following discharge from the GI Lab, please call: [phone] from 7 am- 4:30 pm.  In the event of an emergency please go to the closest Emergency Department or call Dr. Wu at [phone]

## 2024-11-18 ENCOUNTER — OFFICE VISIT (OUTPATIENT)
Dept: URGENT CARE | Age: 79
End: 2024-11-18
Payer: COMMERCIAL

## 2024-11-18 ENCOUNTER — TELEPHONE (OUTPATIENT)
Dept: RADIATION ONCOLOGY | Facility: HOSPITAL | Age: 79
End: 2024-11-18
Payer: MEDICARE

## 2024-11-18 VITALS
DIASTOLIC BLOOD PRESSURE: 78 MMHG | RESPIRATION RATE: 20 BRPM | WEIGHT: 200 LBS | HEIGHT: 69 IN | HEART RATE: 83 BPM | SYSTOLIC BLOOD PRESSURE: 126 MMHG | TEMPERATURE: 97.5 F | BODY MASS INDEX: 29.62 KG/M2 | OXYGEN SATURATION: 98 %

## 2024-11-18 DIAGNOSIS — Z48.02 VISIT FOR SUTURE REMOVAL: Primary | ICD-10-CM

## 2024-11-18 RX ORDER — BACITRACIN ZINC 500 UNIT/G
1 OINTMENT IN PACKET (EA) TOPICAL ONCE
Status: COMPLETED | OUTPATIENT
Start: 2024-11-18 | End: 2024-11-18

## 2024-11-18 ASSESSMENT — ENCOUNTER SYMPTOMS
JOINT SWELLING: 0
PSYCHIATRIC NEGATIVE: 1
BACK PAIN: 0
FEVER: 0
ALLERGIC/IMMUNOLOGIC NEGATIVE: 1
FREQUENCY: 0
ABDOMINAL PAIN: 0
ARTHRALGIAS: 0
PALPITATIONS: 0
DYSURIA: 0
FATIGUE: 0
COLOR CHANGE: 0
DIFFICULTY URINATING: 0
DIARRHEA: 0
CHEST TIGHTNESS: 0
DIZZINESS: 0
RECTAL PAIN: 0
WOUND: 1
BLOOD IN STOOL: 0
CONSTIPATION: 0
UNEXPECTED WEIGHT CHANGE: 0
ANAL BLEEDING: 0
HEMATURIA: 0
COUGH: 0
WEAKNESS: 0
SHORTNESS OF BREATH: 0

## 2024-11-18 ASSESSMENT — PAIN SCALES - GENERAL: PAINLEVEL_OUTOF10: 0-NO PAIN

## 2024-11-18 NOTE — PROGRESS NOTES
"Subjective   Patient ID: Ebenezer Campbell is a 79 y.o. male. They present today with a chief complaint of Suture / Staple Removal (7 sutures on rt hand index finger).    History of Present Illness  Subjective  Ebenezer Campbell is a 79 y.o. male who obtained a laceration 10 days ago, which required closure with 7 sutures. Mechanism of injury: heavy object fell on finger. He denies pain, redness, or drainage from the wound. His last tetanus was 3 years ago.    Objective  /78 (BP Location: Left arm, Patient Position: Sitting, BP Cuff Size: Adult)   Pulse 83   Temp 36.4 °C (97.5 °F) (Oral)   Resp 20   Ht 1.753 m (5' 9\")   Wt 90.7 kg (200 lb)   SpO2 98%   BMI 29.53 kg/m²   Injury exam:  A 5 cm laceration noted on the right index finger is healing well, without evidence of infection.    Assessment/Plan  Laceration is healing well, without evidence of infection.    1. 7 sutures were removed.  2. Wound care discussed.  3. Follow up as needed.        History provided by:  Patient   used: No    Suture / Staple Removal         Past Medical History  Allergies as of 11/18/2024    (No Known Allergies)       (Not in a hospital admission)       Past Medical History:   Diagnosis Date    Contact with and (suspected) exposure to other viral communicable diseases 12/30/2014    Exposure to influenza    Dysphagia, unspecified 04/16/2013    Difficulty swallowing    Early cataracts, bilateral 04/18/2023    Hypokalemia 04/18/2023    Impacted cerumen, right ear 02/16/2017    Impacted cerumen of right ear    Obstructive sleep apnea (adult) (pediatric) 10/28/2015    Obstructive sleep apnea on CPAP    Other conditions influencing health status     Nephrolithiasis    Other enthesopathies, not elsewhere classified     Tendonitis of shoulder    Other fatigue     Tired    Other fracture of first lumbar vertebra, initial encounter for closed fracture (Multi)     Closed fracture of L1 vertebra    Other tear of " medial meniscus, current injury, right knee, initial encounter 04/30/2024    Personal history of other diseases of the circulatory system 10/01/2015    History of hypertension    Personal history of other diseases of the musculoskeletal system and connective tissue 12/16/2014    History of low back pain    Personal history of other diseases of the nervous system and sense organs 02/06/2015    History of sleep apnea    Personal history of other diseases of the respiratory system     History of allergic rhinitis    Personal history of other diseases of urinary system 10/28/2015    History of hematuria    Personal history of other specified conditions     History of edema    Residual hemorrhoidal skin tags 04/23/2014    External hemorrhoids    Snoring 05/16/2014    Snoring    Sprain of joints and ligaments of unspecified parts of neck, initial encounter 12/19/2014    Neck sprain    Unspecified visual loss     Vision problem    Vitamin D deficiency, unspecified     Vitamin D deficiency       Past Surgical History:   Procedure Laterality Date    COLONOSCOPY  11/28/2012    Complete Colonoscopy    ESOPHAGOGASTRODUODENOSCOPY  10/28/2016    Esophagogastroduodenoscopy With Biopsy    KNEE ARTHROSCOPY W/ DEBRIDEMENT  04/23/2014    Arthroscopy Knee Left    OTHER SURGICAL HISTORY  01/22/2020    Cataract surgery    OTHER SURGICAL HISTORY  02/04/2022    Esophagogastroduodenoscopy    OTHER SURGICAL HISTORY  11/19/2021    Cardiac catheterization with stent placement    UMBILICAL HERNIA REPAIR  11/28/2012    Umbilical Hernia Repair        reports that he has never smoked. He has never used smokeless tobacco. He reports current alcohol use. He reports that he does not use drugs.    Review of Systems  Review of Systems   Skin:  Positive for wound. Negative for color change, pallor and rash.                                  Objective    Vitals:    11/18/24 0835   BP: 126/78   BP Location: Left arm   Patient Position: Sitting   BP Cuff  "Size: Adult   Pulse: 83   Resp: 20   Temp: 36.4 °C (97.5 °F)   TempSrc: Oral   SpO2: 98%   Weight: 90.7 kg (200 lb)   Height: 1.753 m (5' 9\")     No LMP for male patient.    Physical Exam  Musculoskeletal:      Right hand: Swelling and laceration present. No deformity, tenderness or bony tenderness. Normal range of motion. Normal strength. Normal sensation. Normal capillary refill. Normal pulse.      Left hand: Normal.      Comments: Repaired laceration, see note.    Skin:     General: Skin is warm and dry.      Capillary Refill: Capillary refill takes less than 2 seconds.      Coloration: Skin is not ashen, cyanotic, jaundiced, mottled, pale or sallow.      Findings: Signs of injury, laceration and wound present. No abscess, bruising, ecchymosis or erythema.      Comments: 5 cm laceration that was repaired with 7 sutures to dorsum of second digit right hand overlying the PIP joint without evidence of infection or neurovascular compromise.         Suture Removal    Date/Time: 11/18/2024 9:10 AM    Performed by: SHILO Sen  Authorized by: SHILO Sen    Consent:     Consent obtained:  Verbal    Consent given by:  Patient    Risks, benefits, and alternatives were discussed: yes      Risks discussed:  Bleeding, pain and wound separation    Alternatives discussed:  No treatment  Universal protocol:     Procedure explained and questions answered to patient or proxy's satisfaction: yes      Patient identity confirmed:  Verbally with patient  Location:     Location:  Upper extremity    Upper extremity location:  Hand    Hand location:  R index finger  Procedure details:     Wound appearance:  No signs of infection, good wound healing, clean, moist and pink    Number of sutures removed:  7  Post-procedure details:     Post-removal:  Antibiotic ointment applied and dressing applied    Procedure completion:  Tolerated      Point of Care Test & Imaging Results from this visit  No results found for " this visit on 11/18/24.   No results found.    Diagnostic study results (if any) were reviewed by SHILO Sen.    Assessment/Plan   Allergies, medications, history, and pertinent labs/EKGs/Imaging reviewed by SHILO Sen.     Medical Decision Making    Urgent Care Course:  Patient presents to  for suture removal from the right index finger.  Patient is afebrile, hemodynamically stable.  Patient denies fever, n/v, cp, sob, ab pain, dysuria, diarrhea.  Patient is up to date on tetanus.  Neurovascular intact, capillary refill less than 2 seconds, range of motion intact, edges well approximated, and no evidence of cellulitis/purulent drainage.  Please see procedure note for details.  Patient given wound infection warning signs, and will continue topical antibiotic for the next 5 days.    Prior external records reviewed: Outpatient records  Reviewed pertinent findings from resulted labs: None   Imaging and EKG: Interpretation by radiology and independently interpreted by me.  None   Independent Hx provided by:  Patient, Family  Medications considered, but ultimately not given: oral antibiotic  Dx tests considered but ultimately not ordered:  x-ray  Social determinant that may affects healthcare:  Pharmacy closed  Pt's case/impression summarized and discussed with:  Patient, Family  Likely Dx given clinical picture:  Suture removal from right index finger  Although not an exhaustive list of Differential Diagnosis (though considered), patient's HPI, PE, and other findings are not suggestive of:  Laceration, avulsion, contact dermatitis, cellulitis, allergic dermatitis, skin tear  Patient at time of discharge was clinically well-appearing and HDS for outpatient management. The patient and/or family was given the opportunity to ask questions prior to discharge, understood my verbal discussion of the plans for treatment, expected course, indications to return to ED, and the need for timely follow  up as directed.    Condition: Stable  Disposition:  Discharge    Orders and Diagnoses  Diagnoses and all orders for this visit:  Visit for suture removal  -     bacitracin ointment 1 Application      Medical Admin Record      Patient disposition: Home    Electronically signed by SHILO Sen  9:10 AM

## 2024-11-18 NOTE — PROGRESS NOTES
"Cancer synopsis:  Rad/onc: Dr. Malloy/ [facility]    79 M initially presented with elevated PSA, transrectal US-guided prostate biopsy showed GS 4+3 disease in 3 samples, 9/12 cores positive. Enrolled in [facility]  010 assigned  to daro and adt 6m w/ SBRT.     PSA:  2/15/23 5.8  1/16/23 6.8  7/8/22 4.5     He had a prostate MRI and PSMA PET/CT showing no evidence of metastatic disease.     Decipher score 0.63     05/25/2023: Started ADT and daro 6m course     SBRT to prostate and SV 06/23/2023    Recent imaging:  MR of knee 04/2024: Meniscus tear and sprain  Ct chest wo 02/2024 dilated thoracic aorta     History of presenting illness:    Patient ID: [id number]     Ebeneezr Campbell is a 79 y.o. 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    RT Site: Prostate and SV  RT Date: 06/23/2023  Hormone therapy: Yes, completed 11/2023 with 6m of ADT and daro w/ testosterone recovery  Hot Flushes: Denies  Fatigue: Denies  Bone pain: Denies  ED: Admits to loss of sexual function. Had used Viagra with variable response. Admits orgasms where\"dry\" and states orgasm sensation had reduced however now finds its very hard impossible to obtain erections. Not concerned at this time however.  - Quality of erections during the last 4 weeks: 2 = Not firm enough for any sexual activity  - Use of erectile dysfunction medications:  Viagra (has cialis rx but not fill or used)  IPSS: 7  Urinary symptoms: Denies dysuria, hematuria. Denies weak stream or incomplete bladder emptying. Does report that with coffee intake increased urgency mild leakage of urine. Denies urgency outside of this. Denies nocturia  Urinary Medications: Yes, flowmax daily  Rectal bleeding: Admits one episode of rectal bleeding this week.   Colonoscopy: 11/2024 diverticulosis, hemorrhoids, 4 sub-centimeter polyps removed, scattered angio ectasis noted in distal rectum r/t to recent RT, " next in 3yrs  Other systems: Denies SOB, CP or fever. Recent had r knee meniscus repair, recovering well.    Review of systems:  Review of Systems   Constitutional:  Negative for fatigue, fever and unexpected weight change.   Respiratory:  Negative for cough, chest tightness and shortness of breath.    Cardiovascular:  Negative for chest pain, palpitations and leg swelling.   Gastrointestinal:  Negative for abdominal pain, anal bleeding, blood in stool, constipation, diarrhea and rectal pain.   Endocrine: Negative for cold intolerance, heat intolerance and polyuria.   Genitourinary:  Negative for decreased urine volume, difficulty urinating, dysuria, frequency, hematuria and urgency.   Musculoskeletal:  Negative for arthralgias, back pain, gait problem and joint swelling.   Skin: Negative.    Allergic/Immunologic: Negative.    Neurological:  Negative for dizziness, syncope and weakness.   Psychiatric/Behavioral: Negative.       Past Medical history  Past Medical History:   Diagnosis Date    Contact with and (suspected) exposure to other viral communicable diseases 12/30/2014    Exposure to influenza    Dysphagia, unspecified 04/16/2013    Difficulty swallowing    Early cataracts, bilateral 04/18/2023    Hypokalemia 04/18/2023    Impacted cerumen, right ear 02/16/2017    Impacted cerumen of right ear    Obstructive sleep apnea (adult) (pediatric) 10/28/2015    Obstructive sleep apnea on CPAP    Other conditions influencing health status     Nephrolithiasis    Other enthesopathies, not elsewhere classified     Tendonitis of shoulder    Other fatigue     Tired    Other fracture of first lumbar vertebra, initial encounter for closed fracture (Multi)     Closed fracture of L1 vertebra    Other tear of medial meniscus, current injury, right knee, initial encounter 04/30/2024    Personal history of other diseases of the circulatory system 10/01/2015    History of hypertension    Personal history of other diseases of the  musculoskeletal system and connective tissue 2014    History of low back pain    Personal history of other diseases of the nervous system and sense organs 2015    History of sleep apnea    Personal history of other diseases of the respiratory system     History of allergic rhinitis    Personal history of other diseases of urinary system 10/28/2015    History of hematuria    Personal history of other specified conditions     History of edema    Residual hemorrhoidal skin tags 2014    External hemorrhoids    Snoring 2014    Snoring    Sprain of joints and ligaments of unspecified parts of neck, initial encounter 2014    Neck sprain    Unspecified visual loss     Vision problem    Vitamin D deficiency, unspecified     Vitamin D deficiency      Surgical/family history  Family History   Problem Relation Name Age of Onset    Other (natural causes) Mother           at 93    Aortic aneurysm Father           at 87,  natural causes    Dementia Father          AAA repair in his 60s    No Known Problems Sister      Diabetes Brother González          at 76 - arrested at home w/ chest pain    Prostate cancer Brother González         IDDM since his 20s    Heart disease Brother González     Heart attack Brother Bill     Other (heart stents) Brother Bill     Allergies Brother Bill     Other (cardiac stents) Brother Stephen     Parkinsonism Brother andrei     Kidney cancer Brother andrei     Prostate cancer Brother Abdoul     Other (elevated psa) Brother Aguilar     No Known Problems Daughter      No Known Problems Daughter      No Known Problems Daughter      Alopecia Son Maximilian     Cystic fibrosis Son Esteban         no CF symptoms    Coronary artery disease Other Family History       Past Surgical History:   Procedure Laterality Date    COLONOSCOPY  2012    Complete Colonoscopy    ESOPHAGOGASTRODUODENOSCOPY  10/28/2016    Esophagogastroduodenoscopy With Biopsy    KNEE ARTHROSCOPY W/ DEBRIDEMENT  2014     Arthroscopy Knee Left    OTHER SURGICAL HISTORY  01/22/2020    Cataract surgery    OTHER SURGICAL HISTORY  02/04/2022    Esophagogastroduodenoscopy    OTHER SURGICAL HISTORY  11/19/2021    Cardiac catheterization with stent placement    UMBILICAL HERNIA REPAIR  11/28/2012    Umbilical Hernia Repair      Social History  Tobacco Use: Low Risk  (11/18/2024)    Patient History     Smoking Tobacco Use: Never     Smokeless Tobacco Use: Never     Passive Exposure: Not on file       Current med list:  Current Outpatient Medications   Medication Instructions    aspirin 81 mg, Daily    atorvastatin (Lipitor) 80 mg tablet TAKE 1 TABLET BY MOUTH EVERY DAY FOR HIGH CHOLESTEROL    cetirizine (ZYRTEC) 10 mg, Daily PRN    cholecalciferol (Vitamin D-3) 50 mcg (2,000 unit) capsule 1 capsule, Daily    fluticasone (Flonase) 50 mcg/actuation nasal spray 1 spray, 2 times daily    losartan (COZAAR) 50 mg, oral, Daily    MULTIVITAMIN ORAL 1 capsule, Daily    omeprazole (PRILOSEC) 40 mg, oral, Daily before breakfast    tamsulosin (FLOMAX) 0.4 mg, oral, Daily, For prostate      Last recorded vital:  /71   Pulse 63   Temp 36.4 °C (97.5 °F) (Temporal)   Resp 18   Wt 92.4 kg (203 lb 12.8 oz)   SpO2 97%   BMI 30.10 kg/m²     Physical Exam  Constitutional:       Appearance: Normal appearance.   Cardiovascular:      Rate and Rhythm: Normal rate.   Pulmonary:      Effort: Pulmonary effort is normal.      Breath sounds: Normal breath sounds.   Musculoskeletal:         General: Normal range of motion.      Cervical back: Normal range of motion.   Neurological:      Mental Status: He is alert and oriented to person, place, and time.   Psychiatric:         Mood and Affect: Mood normal.         Behavior: Behavior normal.         Thought Content: Thought content normal.         Judgment: Judgment normal.       Pertinent labs:  Prostate Specific AG   Date/Time Value Ref Range Status   11/15/2023 08:06 AM <0.01 <=4.00 ng/mL Final     PSA    Date/Time Value Ref Range Status   11/09/2024 09:10 AM 0.2 0.0 - 4.0 ng/mL Final     Comment:     INTERPRETIVE INFORMATION: Prostate Specific Antigen    The Roche PSA electrochemiluminescent immunoassay is used. Results   obtained with different test methods or kits cannot be used   interchangeably. The Roche PSA method is approved for use as an   aid in the detection of prostate cancer when used in conjunction   with a digital rectal exam in individuals with a prostate age 50   years and older. The Roche PSA is also indicated for the serial   measurement of PSA to aid in the prognosis and management of   prostate cancer patients. Elevated PSA concentrations can only   suggest the presence of prostate cancer until biopsy is performed.   PSA concentrations can also be elevated in benign prostatic   hyperplasia or inflammatory conditions of the prostate. PSA is   generally not elevated in healthy individuals or individuals with   nonprostatic carcinoma.     Dx:  Problem List Items Addressed This Visit    None  Visit Diagnoses       Malignant neoplasm of prostate (Multi)    -  Primary        PSA of 0.20 was reviewed and is stable. Testerone is currently 411 and is within normal ranges. CBC stable, mild anemia likely r/t to treatment and improving.  Review of latent SE including rectal bleeding, hematuria, urinary strictures, ED where reviewed as well as how to contact office if s/s present. Denies latent SE. Does report some ongoing ED however had issues prior. Likely current ED loss is caused by low Testerone. NCCN guidelines where reviewed and routine FUV of every 3m for first year and every 6m for four years for a total of five years was discussed. Patient verbalized understanding.      Cialis 10 mg PRN sexual function. Discussed can increased to 20 mg if needed for sexual function. Will consider. Reports on 10mg mild effect at this time.    Reviewed supplementation with iron daily until next visit if patient  would like to help with mild anemia however trending upward and almost resolved. Reviewed could cause constipation and usage of colace is recmonded to help.     PLAN:  FUV 6m per NRG  010  Labs CBC, CMP, PSA and testerone  Imaging none  Flowmax daily  Cialis 10mg PRN  FUV other providers: PCP for routine evals    Please contact office with any concerns:  Yan Salcedo CNP  [phone]

## 2024-11-19 ENCOUNTER — DOCUMENTATION (OUTPATIENT)
Dept: RADIATION ONCOLOGY | Facility: HOSPITAL | Age: 79
End: 2024-11-19

## 2024-11-19 ENCOUNTER — HOSPITAL ENCOUNTER (OUTPATIENT)
Dept: RADIATION ONCOLOGY | Facility: HOSPITAL | Age: 79
Setting detail: RADIATION/ONCOLOGY SERIES
Discharge: HOME | End: 2024-11-19
Payer: MEDICARE

## 2024-11-19 VITALS
BODY MASS INDEX: 30.1 KG/M2 | RESPIRATION RATE: 18 BRPM | WEIGHT: 203.8 LBS | DIASTOLIC BLOOD PRESSURE: 71 MMHG | HEART RATE: 63 BPM | OXYGEN SATURATION: 97 % | SYSTOLIC BLOOD PRESSURE: 113 MMHG | TEMPERATURE: 97.5 F

## 2024-11-19 DIAGNOSIS — D64.9 ANEMIA, UNSPECIFIED TYPE: ICD-10-CM

## 2024-11-19 DIAGNOSIS — C61 MALIGNANT NEOPLASM OF PROSTATE (MULTI): Primary | ICD-10-CM

## 2024-11-19 LAB
LABORATORY COMMENT REPORT: NORMAL
PATH REPORT.FINAL DX SPEC: NORMAL
PATH REPORT.GROSS SPEC: NORMAL
PATH REPORT.TOTAL CANCER: NORMAL

## 2024-11-19 PROCEDURE — 99214 OFFICE O/P EST MOD 30 MIN: CPT

## 2024-11-19 ASSESSMENT — ENCOUNTER SYMPTOMS
DEPRESSION: 0
LOSS OF SENSATION IN FEET: 0
OCCASIONAL FEELINGS OF UNSTEADINESS: 0

## 2024-11-19 ASSESSMENT — PAIN SCALES - GENERAL: PAINLEVEL_OUTOF10: 0-NO PAIN

## 2024-11-19 NOTE — RESEARCH NOTES
STUDY: NRG-  VISIT: 18M    Clinical Research Specialist saw patient in clinic today.    Adverse Events and Concomitant Medications assessed.    Reconsent completed with Yan Jones.    Next appointment and contact information provided.    All questions answered to patient satisfaction.    Connie Eddy  11/19/2024

## 2024-11-23 ENCOUNTER — OFFICE VISIT (OUTPATIENT)
Dept: URGENT CARE | Age: 79
End: 2024-11-23
Payer: COMMERCIAL

## 2024-11-23 VITALS
HEART RATE: 82 BPM | WEIGHT: 200 LBS | DIASTOLIC BLOOD PRESSURE: 71 MMHG | OXYGEN SATURATION: 95 % | TEMPERATURE: 98.1 F | RESPIRATION RATE: 20 BRPM | SYSTOLIC BLOOD PRESSURE: 129 MMHG | HEIGHT: 69 IN | BODY MASS INDEX: 29.62 KG/M2

## 2024-11-23 DIAGNOSIS — L03.011 CELLULITIS OF FINGER OF RIGHT HAND: Primary | ICD-10-CM

## 2024-11-23 PROCEDURE — 1157F ADVNC CARE PLAN IN RCRD: CPT

## 2024-11-23 PROCEDURE — 3074F SYST BP LT 130 MM HG: CPT

## 2024-11-23 PROCEDURE — 1123F ACP DISCUSS/DSCN MKR DOCD: CPT

## 2024-11-23 PROCEDURE — 99070 SPECIAL SUPPLIES PHYS/QHP: CPT

## 2024-11-23 PROCEDURE — 1159F MED LIST DOCD IN RCRD: CPT

## 2024-11-23 PROCEDURE — 99213 OFFICE O/P EST LOW 20 MIN: CPT

## 2024-11-23 PROCEDURE — 3078F DIAST BP <80 MM HG: CPT

## 2024-11-23 RX ORDER — CEPHALEXIN 500 MG/1
500 CAPSULE ORAL 3 TIMES DAILY
Qty: 30 CAPSULE | Refills: 0 | Status: SHIPPED | OUTPATIENT
Start: 2024-11-23 | End: 2024-12-03

## 2024-11-23 RX ORDER — CEPHALEXIN 500 MG/1
500 CAPSULE ORAL 3 TIMES DAILY
Qty: 21 CAPSULE | Refills: 0 | Status: SHIPPED | OUTPATIENT
Start: 2024-11-23 | End: 2024-11-23

## 2024-11-24 ASSESSMENT — ENCOUNTER SYMPTOMS
CHILLS: 0
FEVER: 0
FATIGUE: 0
ARTHRALGIAS: 0
COLOR CHANGE: 1
JOINT SWELLING: 0
DIAPHORESIS: 0
WOUND: 1

## 2024-11-24 NOTE — PROGRESS NOTES
Subjective   Patient ID: Ebenezer Campbell is a 79 y.o. male. They present today with a chief complaint of old laceration (Sutures were taken out on 11/18- wound is red and hurts).    History of Present Illness  Subjective  Ebenezer Campbell is a 79 y.o. male who presents for evaluation of a possible skin infection located on the right index finger. Onset of symptoms was gradual starting a few days ago, with gradually worsening course since that time. Patient had laceration repair with sutures on 11/8/2024.  Symptoms include mild pain and erythema located dorsal surface right index finger. Patient denies chills and fever greater than 100. There is a history of trauma to the area. Treatment to date has included warm compresses and topical antibiotics started 1 week ago with minimal relief.        History provided by:  Patient   used: No        Past Medical History  Allergies as of 11/23/2024    (No Known Allergies)       (Not in a hospital admission)       Past Medical History:   Diagnosis Date    Contact with and (suspected) exposure to other viral communicable diseases 12/30/2014    Exposure to influenza    Dysphagia, unspecified 04/16/2013    Difficulty swallowing    Early cataracts, bilateral 04/18/2023    Hypokalemia 04/18/2023    Impacted cerumen, right ear 02/16/2017    Impacted cerumen of right ear    Obstructive sleep apnea (adult) (pediatric) 10/28/2015    Obstructive sleep apnea on CPAP    Other conditions influencing health status     Nephrolithiasis    Other enthesopathies, not elsewhere classified     Tendonitis of shoulder    Other fatigue     Tired    Other fracture of first lumbar vertebra, initial encounter for closed fracture (Multi)     Closed fracture of L1 vertebra    Other tear of medial meniscus, current injury, right knee, initial encounter 04/30/2024    Personal history of other diseases of the circulatory system 10/01/2015    History of hypertension    Personal history  of other diseases of the musculoskeletal system and connective tissue 12/16/2014    History of low back pain    Personal history of other diseases of the nervous system and sense organs 02/06/2015    History of sleep apnea    Personal history of other diseases of the respiratory system     History of allergic rhinitis    Personal history of other diseases of urinary system 10/28/2015    History of hematuria    Personal history of other specified conditions     History of edema    Residual hemorrhoidal skin tags 04/23/2014    External hemorrhoids    Snoring 05/16/2014    Snoring    Sprain of joints and ligaments of unspecified parts of neck, initial encounter 12/19/2014    Neck sprain    Unspecified visual loss     Vision problem    Vitamin D deficiency, unspecified     Vitamin D deficiency       Past Surgical History:   Procedure Laterality Date    COLONOSCOPY  11/28/2012    Complete Colonoscopy    ESOPHAGOGASTRODUODENOSCOPY  10/28/2016    Esophagogastroduodenoscopy With Biopsy    KNEE ARTHROSCOPY W/ DEBRIDEMENT  04/23/2014    Arthroscopy Knee Left    OTHER SURGICAL HISTORY  01/22/2020    Cataract surgery    OTHER SURGICAL HISTORY  02/04/2022    Esophagogastroduodenoscopy    OTHER SURGICAL HISTORY  11/19/2021    Cardiac catheterization with stent placement    UMBILICAL HERNIA REPAIR  11/28/2012    Umbilical Hernia Repair        reports that he has never smoked. He has never used smokeless tobacco. He reports current alcohol use. He reports that he does not use drugs.    Review of Systems  Review of Systems   Constitutional:  Negative for chills, diaphoresis, fatigue and fever.   Musculoskeletal:  Negative for arthralgias and joint swelling.   Skin:  Positive for color change and wound. Negative for pallor and rash.                                  Objective    Vitals:    11/23/24 1832   BP: 129/71   Pulse: 82   Resp: 20   Temp: 36.7 °C (98.1 °F)   TempSrc: Oral   SpO2: 95%   Weight: 90.7 kg (200 lb)   Height: 1.753  "m (5' 9\")     No LMP for male patient.    Physical Exam  Vitals and nursing note reviewed.   Constitutional:       General: He is not in acute distress.     Appearance: Normal appearance. He is normal weight. He is not ill-appearing, toxic-appearing or diaphoretic.   Cardiovascular:      Rate and Rhythm: Normal rate.      Pulses: Normal pulses.   Pulmonary:      Effort: Pulmonary effort is normal.   Musculoskeletal:         General: Swelling and tenderness present.   Skin:     General: Skin is warm and dry.      Capillary Refill: Capillary refill takes less than 2 seconds.      Coloration: Skin is not ashen, cyanotic, jaundiced, mottled, pale or sallow.      Comments: Dorsum of second digit right with 5 cm elliptical-shaped wound with surrounding warmth and erythema.  No purulent drainage noted.  Mild tenderness.   Neurological:      General: No focal deficit present.      Mental Status: He is alert.         Procedures    Point of Care Test & Imaging Results from this visit  No results found for this visit on 11/23/24.   No results found.    Diagnostic study results (if any) were reviewed by SHILO Sen.    Assessment/Plan   Allergies, medications, history, and pertinent labs/EKGs/Imaging reviewed by SHILO Sen.     Medical Decision Making    Urgent Care Course:  78 yo presents to the  with cellulitis.  Patient is afebrile, hemodynamically stable.  Patient denies fever, n/v, cough, cp, sob, ab pain, dysuria, and diarrhea.  Previously repaired laceration.  Sutures were removed by me on 11/18/2024. Patient educated about cellulitis.  Patient given prescription for cephalexin.  Patient given infection warning signs and will f/u with pcp.        Prior external records reviewed: Outpatient records     Independent Hx provided by: Patient     Med Rx considered but ultimately not given: none      Dx tests considered but ultimately not ordered: tissue culture    Social determinant that may " affects healthcare: none    Pt’s case/impression summarized and discussed with: Patient     Likely Dx given clinical picture: cellulitis     Although not an exhaustive list of Differential Diagnosis (though considered), patient’s HPI, PE, and other findings are not suggestive of: contact dermatitis, hives, allergic reaction, necrotizing fasciitis, allergic dermatitis      Patient at time of discharge was clinically well-appearing and HDS for outpatient management. The patient and/or family was given the opportunity to ask questions prior to discharge, understood my verbal discussion of the plans for treatment, expected course, indications to return to ED, and the need for timely follow up as directed.       Condition: Stable     Disposition: Discharge     Orders and Diagnoses  Diagnoses and all orders for this visit:  Cellulitis of finger of right hand  -     cephalexin (Keflex) 500 mg capsule; Take 1 capsule (500 mg) by mouth 3 times a day for 10 days.      Medical Admin Record      Patient disposition: Home    Electronically signed by SHILO Sen  8:28 AM

## 2024-12-13 ENCOUNTER — CLINICAL SUPPORT (OUTPATIENT)
Dept: URGENT CARE | Age: 79
End: 2024-12-13
Payer: COMMERCIAL

## 2024-12-13 ENCOUNTER — ANCILLARY PROCEDURE (OUTPATIENT)
Dept: URGENT CARE | Age: 79
End: 2024-12-13
Payer: COMMERCIAL

## 2024-12-13 VITALS
OXYGEN SATURATION: 99 % | DIASTOLIC BLOOD PRESSURE: 81 MMHG | BODY MASS INDEX: 29.53 KG/M2 | WEIGHT: 200 LBS | SYSTOLIC BLOOD PRESSURE: 144 MMHG | RESPIRATION RATE: 16 BRPM | HEART RATE: 68 BPM | TEMPERATURE: 96.9 F

## 2024-12-13 DIAGNOSIS — M79.644 PAIN IN RIGHT FINGER(S): ICD-10-CM

## 2024-12-13 DIAGNOSIS — L03.011 CELLULITIS OF FINGER OF RIGHT HAND: Primary | ICD-10-CM

## 2024-12-13 PROCEDURE — 73140 X-RAY EXAM OF FINGER(S): CPT | Mod: RIGHT SIDE | Performed by: NURSE PRACTITIONER

## 2024-12-13 RX ORDER — DOXYCYCLINE 100 MG/1
100 CAPSULE ORAL 2 TIMES DAILY
Qty: 20 CAPSULE | Refills: 0 | Status: SHIPPED | OUTPATIENT
Start: 2024-12-13 | End: 2024-12-23

## 2024-12-13 ASSESSMENT — ENCOUNTER SYMPTOMS
JOINT SWELLING: 1
CHILLS: 0
FEVER: 0

## 2024-12-13 NOTE — PROGRESS NOTES
Subjective   Patient ID: Ebenezer Campbell is a 79 y.o. male. They present today with a chief complaint of Rash (Right index finger).    History of Present Illness    Rash  Pertinent negatives include no fever.       Patient presents to urgent care for complaints of pain and swelling in his right index finger.  Patient states he was seen here last month where he had 7 stitches placed and a right index finger.  Patient reports after stitches were taken out he developed some redness and was treated for possible cellulitis.  Patient states he finished antibiotics noticed some improvement of the redness.  Reports the past few days he has had increased swelling and states that the redness is coming back.    Past Medical History  Allergies as of 12/13/2024    (No Known Allergies)       (Not in a hospital admission)       Past Medical History:   Diagnosis Date    Contact with and (suspected) exposure to other viral communicable diseases 12/30/2014    Exposure to influenza    Dysphagia, unspecified 04/16/2013    Difficulty swallowing    Early cataracts, bilateral 04/18/2023    Hypokalemia 04/18/2023    Impacted cerumen, right ear 02/16/2017    Impacted cerumen of right ear    Obstructive sleep apnea (adult) (pediatric) 10/28/2015    Obstructive sleep apnea on CPAP    Other conditions influencing health status     Nephrolithiasis    Other enthesopathies, not elsewhere classified     Tendonitis of shoulder    Other fatigue     Tired    Other fracture of first lumbar vertebra, initial encounter for closed fracture (Multi)     Closed fracture of L1 vertebra    Other tear of medial meniscus, current injury, right knee, initial encounter 04/30/2024    Personal history of other diseases of the circulatory system 10/01/2015    History of hypertension    Personal history of other diseases of the musculoskeletal system and connective tissue 12/16/2014    History of low back pain    Personal history of other diseases of the nervous  system and sense organs 02/06/2015    History of sleep apnea    Personal history of other diseases of the respiratory system     History of allergic rhinitis    Personal history of other diseases of urinary system 10/28/2015    History of hematuria    Personal history of other specified conditions     History of edema    Residual hemorrhoidal skin tags 04/23/2014    External hemorrhoids    Snoring 05/16/2014    Snoring    Sprain of joints and ligaments of unspecified parts of neck, initial encounter 12/19/2014    Neck sprain    Unspecified visual loss     Vision problem    Vitamin D deficiency, unspecified     Vitamin D deficiency       Past Surgical History:   Procedure Laterality Date    COLONOSCOPY  11/28/2012    Complete Colonoscopy    ESOPHAGOGASTRODUODENOSCOPY  10/28/2016    Esophagogastroduodenoscopy With Biopsy    KNEE ARTHROSCOPY W/ DEBRIDEMENT  04/23/2014    Arthroscopy Knee Left    OTHER SURGICAL HISTORY  01/22/2020    Cataract surgery    OTHER SURGICAL HISTORY  02/04/2022    Esophagogastroduodenoscopy    OTHER SURGICAL HISTORY  11/19/2021    Cardiac catheterization with stent placement    UMBILICAL HERNIA REPAIR  11/28/2012    Umbilical Hernia Repair        reports that he has never smoked. He has never used smokeless tobacco. He reports current alcohol use. He reports that he does not use drugs.    Review of Systems  Review of Systems   Constitutional:  Negative for chills and fever.   Musculoskeletal:  Positive for joint swelling.   Skin:  Positive for rash.                                  Objective    Vitals:    12/13/24 1645   BP: 144/81   Pulse: 68   Resp: 16   Temp: 36.1 °C (96.9 °F)   SpO2: 99%   Weight: 90.7 kg (200 lb)     No LMP for male patient.    Physical Exam  Vitals reviewed.   Constitutional:       Appearance: Normal appearance.   Cardiovascular:      Rate and Rhythm: Normal rate and regular rhythm.      Heart sounds: Normal heart sounds.   Pulmonary:      Effort: Pulmonary effort is  normal.      Breath sounds: Normal breath sounds.   Musculoskeletal:      Right hand: Normal sensation. Normal capillary refill. Normal pulse.      Comments: Right index finger with swelling.  Patient does have rounding to the touch.   Neurological:      Mental Status: He is alert.         Procedures    Point of Care Test & Imaging Results from this visit  No results found for this visit on 12/13/24.   No results found.    Diagnostic study results (if any) were reviewed by [facility].    Assessment/Plan   Allergies, medications, history, and pertinent labs/EKGs/Imaging reviewed by MARLENE Olson-CNP.     Medical Decision Making  Preliminary images reviewed, no acute fracture identified. Will start patient on doxycycline for acute cellulitis. Patient was told to follow up with PCP if symptoms are not improving or worsening.     Orders and Diagnoses  Diagnoses and all orders for this visit:  Pain in right finger(s)  -     XR fingers right 2+ views; Future      Medical Admin Record      Patient disposition: Home    Electronically signed by [facility]  4:55 PM    === 12/13/24 ===    XR FINGERS 2+ VIEWS RIGHT    - Impression -  Soft tissue swelling of the right 2nd digit.    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    Signed by: Jeremy Cespedes 12/13/2024 5:28 PM  Dictation workstation:   [id number]

## 2025-04-10 ENCOUNTER — APPOINTMENT (OUTPATIENT)
Dept: PRIMARY CARE | Facility: CLINIC | Age: 80
End: 2025-04-10
Payer: MEDICARE

## 2025-04-10 VITALS
SYSTOLIC BLOOD PRESSURE: 114 MMHG | HEIGHT: 68 IN | TEMPERATURE: 98.2 F | OXYGEN SATURATION: 93 % | HEART RATE: 77 BPM | BODY MASS INDEX: 31.98 KG/M2 | DIASTOLIC BLOOD PRESSURE: 60 MMHG | WEIGHT: 211 LBS

## 2025-04-10 DIAGNOSIS — D12.6 TUBULAR ADENOMA OF COLON: ICD-10-CM

## 2025-04-10 DIAGNOSIS — E55.9 VITAMIN D DEFICIENCY: ICD-10-CM

## 2025-04-10 DIAGNOSIS — Z96.1 BILATERAL ARTIFICIAL LENS IMPLANT: ICD-10-CM

## 2025-04-10 DIAGNOSIS — Z95.5 PRESENCE OF DRUG COATED STENT IN LAD CORONARY ARTERY: ICD-10-CM

## 2025-04-10 DIAGNOSIS — J30.1 SEASONAL ALLERGIC RHINITIS DUE TO POLLEN: ICD-10-CM

## 2025-04-10 DIAGNOSIS — D64.9 MILD ANEMIA: ICD-10-CM

## 2025-04-10 DIAGNOSIS — E53.9 VITAMIN B DEFICIENCY, UNSPECIFIED: ICD-10-CM

## 2025-04-10 DIAGNOSIS — I25.118 CORONARY ARTERY DISEASE OF NATIVE ARTERY OF NATIVE HEART WITH STABLE ANGINA PECTORIS: ICD-10-CM

## 2025-04-10 DIAGNOSIS — E78.5 HYPERLIPIDEMIA, UNSPECIFIED HYPERLIPIDEMIA TYPE: ICD-10-CM

## 2025-04-10 DIAGNOSIS — C61 PROSTATE CANCER (MULTI): ICD-10-CM

## 2025-04-10 DIAGNOSIS — R10.12 LEFT UPPER QUADRANT ABDOMINAL PAIN: ICD-10-CM

## 2025-04-10 DIAGNOSIS — I10 ESSENTIAL HYPERTENSION WITH GOAL BLOOD PRESSURE LESS THAN 130/85: Primary | ICD-10-CM

## 2025-04-10 DIAGNOSIS — E78.5 DYSLIPIDEMIA, GOAL LDL BELOW 70: ICD-10-CM

## 2025-04-10 DIAGNOSIS — R10.13 DYSPEPSIA: ICD-10-CM

## 2025-04-10 DIAGNOSIS — R73.09 ELEVATED GLUCOSE: ICD-10-CM

## 2025-04-10 DIAGNOSIS — Z00.00 ROUTINE GENERAL MEDICAL EXAMINATION AT HEALTH CARE FACILITY: ICD-10-CM

## 2025-04-10 RX ORDER — LOSARTAN POTASSIUM 50 MG/1
50 TABLET ORAL DAILY
Qty: 90 TABLET | Refills: 3 | Status: SHIPPED | OUTPATIENT
Start: 2025-04-10 | End: 2026-04-10

## 2025-04-10 ASSESSMENT — ACTIVITIES OF DAILY LIVING (ADL)
GROCERY_SHOPPING: INDEPENDENT
PATIENT'S MEMORY ADEQUATE TO SAFELY COMPLETE DAILY ACTIVITIES?: YES
TOILETING: INDEPENDENT
TAKING_MEDICATION: INDEPENDENT
GROOMING: INDEPENDENT
BATHING: INDEPENDENT
MANAGING_FINANCES: INDEPENDENT
FEEDING YOURSELF: INDEPENDENT
ASSISTIVE_DEVICE: EYEGLASSES
JUDGMENT_ADEQUATE_SAFELY_COMPLETE_DAILY_ACTIVITIES: YES
ADEQUATE_TO_COMPLETE_ADL: YES
DOING_HOUSEWORK: INDEPENDENT
DRESSING: INDEPENDENT

## 2025-04-10 NOTE — ASSESSMENT & PLAN NOTE
Orders:    TSH with reflex to Free T4 if abnormal; Future    Lipid Panel; Future    Comprehensive Metabolic Panel; Future

## 2025-04-10 NOTE — PROGRESS NOTES
"Subjective   Reason for Visit: Ebenezer Campbell is an 79 y.o. male here for a Medicare Wellness visit.     Past Medical, Surgical, and Family History reviewed and updated in chart.    Reviewed all medications by prescribing practitioner or clinical pharmacist (such as prescriptions, OTCs, herbal therapies and supplements) and documented in the medical record.    Here for follow up and wellness visit.  Overall doing well for the most part.      Wakes w/ occas left abd pain and lateral lumbar pain, feels blah, occas abd queesy, unsettled stomach, occas loose BMs        Patient Care Team:  Oral Castellano MD as PCP - General  SHILO Ortega as Nurse Practitioner (Gastroenterology)  SHILO Mccormick as Referring Physician (Gastroenterology)     Review of Systems    Objective   Vitals:  /60 (BP Location: Left arm, Patient Position: Sitting, BP Cuff Size: Large adult)   Pulse 77   Temp 36.8 °C (98.2 °F) (Temporal)   Ht 1.727 m (5' 8\")   Wt 95.7 kg (211 lb)   SpO2 93%   BMI 32.08 kg/m²       Physical Exam  Vitals reviewed.   Constitutional:       Appearance: Normal appearance.   HENT:      Head: Normocephalic and atraumatic.   Eyes:      General: No scleral icterus.        Right eye: No discharge.         Left eye: No discharge.      Extraocular Movements: Extraocular movements intact.      Conjunctiva/sclera: Conjunctivae normal.      Pupils: Pupils are equal, round, and reactive to light.   Cardiovascular:      Rate and Rhythm: Normal rate and regular rhythm.      Pulses: Normal pulses.      Heart sounds: Normal heart sounds. No murmur heard.  Pulmonary:      Effort: Pulmonary effort is normal.      Breath sounds: Normal breath sounds. No wheezing or rhonchi.   Musculoskeletal:         General: No deformity or signs of injury. Normal range of motion.      Cervical back: Normal range of motion and neck supple. No rigidity or tenderness.      Comments: Advanced arthritis both hands-right " index finger dorsal scar well-healed   Lymphadenopathy:      Cervical: No cervical adenopathy.   Skin:     General: Skin is warm and dry.      Findings: No rash.   Neurological:      General: No focal deficit present.      Mental Status: He is alert and oriented to person, place, and time. Mental status is at baseline.      Cranial Nerves: No cranial nerve deficit.      Sensory: No sensory deficit.      Gait: Gait normal.   Psychiatric:         Mood and Affect: Mood normal.         Behavior: Behavior normal.         Thought Content: Thought content normal.         Judgment: Judgment normal.         Assessment & Plan  Routine general medical examination at health care facility    Orders:    1 Year Follow Up In [facility] - PCP - Wellness Exam; Future    Coronary artery disease of native artery of native heart with stable angina pectoris         Essential hypertension with goal blood pressure less than 130/85    Orders:    losartan (Cozaar) 50 mg tablet; Take 1 tablet (50 mg) by mouth once daily.    Dyslipidemia, goal LDL below 70    Orders:    TSH with reflex to Free T4 if abnormal; Future    Lipid Panel; Future    Comprehensive Metabolic Panel; Future    Presence of drug coated stent in LAD coronary artery         Seasonal allergic rhinitis due to pollen         Vitamin D deficiency    Orders:    Vitamin D 25-Hydroxy,Total (for eval of Vitamin D levels); Future    Bilateral artificial lens implant         Tubular adenoma of colon         Hyperlipidemia, unspecified hyperlipidemia type    Orders:    TSH with reflex to Free T4 if abnormal; Future    Lipid Panel; Future    Vitamin B deficiency, unspecified    Orders:    Vitamin B12; Future    Mild anemia    Orders:    CBC; Future    Iron and TIBC; Future    Vitamin B12; Future    Folate; Future    Elevated glucose    Orders:    Hemoglobin A1C; Future    Dyspepsia    Orders:    US abdomen complete; Future    Left upper quadrant abdominal pain    Orders:     Referral to Gastroenterology; Future    Prostate cancer (Multi)                Portions of this encounter note have been copied from my previous note dated 10/11/24  , which have been updated where appropriate and all reflect my current medical decision making from today.     We spoke for over 35 minutes, over half the time counseling.  I spent additional 5 minutes on documentation    Nov '24 colonoscopy and labs rev'd      Living situation-he lives alone, independently still drives. Bedroom and laundry on 1st floor, but has an upstairs and basement.  His children are out of town.             He enjoys traveling, and now enjoys the companionship of a friend through the week         Coronary artery disease status post cardiac stent placed 11/13/2020- he has done well following the procedure. At this point cardiac rehab has not deemed to be necessary he states.     widened aortic arch            He will continue his elliptical workouts and follow-up with his cardiologist, Dr. Sethi as directed                now annually-next appointment 8/25        Hypertension- BP improved w/ losartan. He will continue healthy lifestyle efforts             Blood pressure improved presently                Edema-encourage low salt diet along with knee-high compression hose such as Dr. Thurman or some other similar brand           4/25-no issues presently     Elevated weight w/ BMI over 30- He will continue efforts accordingly. Unfortunately BMI remains in the 31 range. He will continue to work to improve this. He has been traveling a lot since his wife passed away. He will work towards weight loss in the new year.           4/24-BMI 33.3.  I informed patient BMI> 30, w/ recommendations for nutrition and exercise plan to help achieve weight reduction.  We spoke at length regarding measures to help improve weight with calorie reduction and more exercise as the foundation of an ongoing long-term weight loss plan.            10/24-with  diet changes, his weight is down 17 pounds in 6 months.  BMI 30.3.  He will continue ongoing weight loss efforts accordingly towards a long-term weight reduction plan.            4/25-BMI 32.1.  Encourage consistent weight loss.  He wondered about Wegovy.  At this point I am not sure if that is a good option       Dyslipidemia he will continue the maximum atorvastatin following his cardiac stent placement.  Goal LDL under 70.          11/23.  LDL 61.            April 2025-annual fasting lipids ordered for next time.    Peripheral vascular concerns with Elevated calcium score- carotid study unremarkable. Abdominal aortic aneurysm unremarkable November 2019. Carotid duplex unremarkable November 2020.           Presently asymptomatic without claudication symptoms      Exercise routine-typically on his home recumbent cycle. He golfs- walks 1-2 x wk, and does yardwork as well               Presently back at the [facility] gym 2-3 x wk,  using his [employer] benefit, on  elliptical 4 times a week, for 30 minutes with a pulse goal around 125, walking, and machines too. Also goes to a weekly yoga class that his brother teaches       Prostate cancer-treated with radiation therapy and then androgen deprivation therapy.  He is followed by Dr. Rincon in urology.  He is in a clinical trial.  He will continue per protocol.  He also plans to [place] back with his original urologist, Dr. Marin              8/24-PSA remains undetectable.  He will continue to follow with his urologist semiannually.  Scheduled to see his radiation oncology practitioner Yan-11/24-he will do labs before               4/25 - he sees XRT NP next in 5/25 - 2 yrs after his XRT with labs before    Dyspepsia-he has had some left-sided abdominal pain recently.  Suggested abdominal ultrasound and gastro follow-up.  He will call for that.  He remains on omeprazole 40 mg each morning        Disequilibrium-though he has a history of dysfunctional eustachian tubes  that's presently not an issue. Encouraged ongoing Flonase and Zyrtec and the antibiotics for the sinusitis. He will use caution with position changes understanding that he could be at risk for falling if he is dizzy. He will notify me with ongoing concerns               Fortunately no balance problems of late     Headache syndrome- His CAT scan was nondiagnostic and cervical spine x-ray does confirm some arthritis. Presently headaches improved, and he canceled his neurology consultation.        Seasonal allergies. Suggested restarting Flonase twice daily and Zyrtec 10 mg daily for any sinus pressure.    Right index finger laceration-late last fall at [facility] he sustained a laceration-requiring urgent care sutures            4/25-he still has some residual stiffness with range of motion      S/p right knee arthroscopy early May 2024 with Dr. Otero        Improved presently      Hx left knee arthroscopy/DJD- not problematic with weight loss and elliptical workouts.            Doing well.  Encouraged walking and caution with uneven ground        RE lumbar spasms-this has happened on several instances, with x-ray showing lumbar arthritis July 2015 and again in November 2009. Without recent trauma we'll defer x-rays presently. Instructions written out to optimize compliance. He will use meloxicam for 7-10 days as the anti-inflammatory, Skelaxin nightly for a week or so for the muscle relaxer. Heating pad and stretching twice daily for 30 minutes recommended and follow-up with physical therapy to optimize long-term stretching routine. He will call if not improved            No back pain w/ weights routine          Osteoarthritis-advanced arthritic changes in his hands.  He will use caution with overuse     Cervical spasms-mainly left-sided-with some tension headaches left lateral lumbar spasms-including encouraged heating pad and stretch.     Right heel discomfort-improved presently. He did have an MRI- showing Achilles  "tendinitis among other findings-residual changes-he remembers he tore his Achilles age 24 in the right side.              10/23-he has had more heel pain posteriorly recently-discussed the possibility of bursitis.  He will [place] back with podiatry-Dr. Tellez       Feet pain-improved with orthotics.      Right shoulder - still occas \"twinge\" w/ certain movements, now daily. He will notify me if he would like further evaluation. Devon declined. Instead he'll do exercises. Fortunately normal range of motion without rotator cuff worrisome findings on exam.                Acid reflux-less problematic with weight loss. Occasionally problematic, with a history of past swallowing issues. EGD 12/21 completed without worrisome findings on biopsy.  He has since discontinued PPI. No active hiatal hernia issues fortunately.            Encouraged him to use the PPI before triggers           Presently on omeprazole 40 mg each morning     Tubular adenomatous colon polyps - noted on 2/17 colonoscopy, and on 12/19 colonoscopy.            Colonoscopy done Nov '24 - next in 3 yrs - Nov '27    Pilonidal cyst concern-4/24-he will meet with the colorectal team to evaluate further for the occasional pain in his coccyx area     Right inguinal hernia/ventral hernia/history of umbilical hernia repair-not problematic    Erectile dysfunction-sildenafil not that helpful in the past.          4/24-suggested a trial of Cialis         9/24-he did not feel this was helpful       Sleep apnea-now back on CPAP            4/24-I asked him to [place] back with the sleep team for reevaluation             10/24-doing better-he will continue to see his sleep provider              4/25 - he notes his smart watch alerts him to low oxygen in mid 80s. Using cpap nightly. Asked him to see Dr. Hernández, who he last saw in 6/24        Scalp skin lesions-encouraged dermatology consultation-he plans to see  Dr. Ann     Dental care- encouraged semiannual dental " visits.     Home itching-occasionally at night recently-history and exam nondiagnostic no new medications lotions soaps detergents suggested topical Benadryl and follow-up if not improved              -encouraged cooler showers, unscented Dove soap rather than [place] spring, and Cetaphil lotion daily after shower             - still doing well w/ dove and cetaphil     Vision care / s/p bilat cataract surgery -  He has a Rx for distance. Dr. Rashid recently noted cataracts have advanced a bit. Follow-up peripheral vascular concerns-w/ Dr. Jeremy Narvaez [facility]            - doing well w/ annual visits to Dr. Narvaez       Bereavement-support provided regarding the sudden passing of his wife with a dissecting aortic aneurysm [place] at the clinic just before 2021. -He now has 8 grandchildren-living out of town he will continue to travel. Remains busy as he begins to de-cluttering his house-with 6 bedrooms he notes.              Looking forward to the holidays with his family      Travel-he has been able to do more traveling with a [employer]  with his daughter.  Anticipating [place] in the spring 2025 with another daughter.       Living will and medical power of  completed forms reviewed/filed at his  visit   I suggested he update these as his  wife is listed as his primary agent on his medical power of                -these were not located in the new EMR.  Will search the old to get these transferred        High dose flu shot annually after Labor Day- updated 10/23     Shingrix series completed      Covid series completed, booster too- 10/23    RSV - suggested , and again 10/24     Follow-up 6 months, sooner as needed    charting was completed using voice recognition technology and may include unintended errors.     F

## 2025-04-17 ENCOUNTER — HOSPITAL ENCOUNTER (OUTPATIENT)
Dept: RADIOLOGY | Facility: CLINIC | Age: 80
Discharge: HOME | End: 2025-04-17
Payer: MEDICARE

## 2025-04-17 DIAGNOSIS — R10.13 DYSPEPSIA: ICD-10-CM

## 2025-04-23 ENCOUNTER — HOSPITAL ENCOUNTER (OUTPATIENT)
Dept: RADIOLOGY | Facility: CLINIC | Age: 80
Discharge: HOME | End: 2025-04-23
Payer: MEDICARE

## 2025-04-23 PROCEDURE — 76700 US EXAM ABDOM COMPLETE: CPT

## 2025-04-28 ENCOUNTER — APPOINTMENT (OUTPATIENT)
Facility: CLINIC | Age: 80
End: 2025-04-28
Payer: MEDICARE

## 2025-05-12 ENCOUNTER — LAB (OUTPATIENT)
Dept: LAB | Facility: HOSPITAL | Age: 80
End: 2025-05-12
Payer: MEDICARE

## 2025-05-12 DIAGNOSIS — N20.0 CALCULUS OF KIDNEY: Primary | ICD-10-CM

## 2025-05-12 DIAGNOSIS — C61 MALIGNANT NEOPLASM OF PROSTATE (MULTI): ICD-10-CM

## 2025-05-12 DIAGNOSIS — D72.819 DECREASED WHITE BLOOD CELL COUNT, UNSPECIFIED: ICD-10-CM

## 2025-05-12 LAB
ALBUMIN SERPL BCP-MCNC: 4.3 G/DL (ref 3.4–5)
ALP SERPL-CCNC: 99 U/L (ref 33–136)
ALT SERPL W P-5'-P-CCNC: 19 U/L (ref 10–52)
ANION GAP SERPL CALC-SCNC: 11 MMOL/L (ref 10–20)
AST SERPL W P-5'-P-CCNC: 18 U/L (ref 9–39)
BASOPHILS # BLD AUTO: 0.06 X10*3/UL (ref 0–0.1)
BASOPHILS NFR BLD AUTO: 1.4 %
BILIRUB SERPL-MCNC: 0.7 MG/DL (ref 0–1.2)
BUN SERPL-MCNC: 21 MG/DL (ref 6–23)
CALCIUM SERPL-MCNC: 9.8 MG/DL (ref 8.6–10.3)
CHLORIDE SERPL-SCNC: 107 MMOL/L (ref 98–107)
CO2 SERPL-SCNC: 28 MMOL/L (ref 21–32)
CREAT SERPL-MCNC: 0.74 MG/DL (ref 0.5–1.3)
EGFRCR SERPLBLD CKD-EPI 2021: >90 ML/MIN/1.73M*2
EOSINOPHIL # BLD AUTO: 0.35 X10*3/UL (ref 0–0.4)
EOSINOPHIL NFR BLD AUTO: 7.9 %
ERYTHROCYTE [DISTWIDTH] IN BLOOD BY AUTOMATED COUNT: 14.2 % (ref 11.5–14.5)
GLUCOSE SERPL-MCNC: 93 MG/DL (ref 74–99)
HCT VFR BLD AUTO: 40.8 % (ref 41–52)
HGB BLD-MCNC: 13.5 G/DL (ref 13.5–17.5)
IMM GRANULOCYTES # BLD AUTO: 0.01 X10*3/UL (ref 0–0.5)
IMM GRANULOCYTES NFR BLD AUTO: 0.2 % (ref 0–0.9)
LYMPHOCYTES # BLD AUTO: 1.59 X10*3/UL (ref 0.8–3)
LYMPHOCYTES NFR BLD AUTO: 36.1 %
MCH RBC QN AUTO: 31.3 PG (ref 26–34)
MCHC RBC AUTO-ENTMCNC: 33.1 G/DL (ref 32–36)
MCV RBC AUTO: 94 FL (ref 80–100)
MONOCYTES # BLD AUTO: 0.4 X10*3/UL (ref 0.05–0.8)
MONOCYTES NFR BLD AUTO: 9.1 %
NEUTROPHILS # BLD AUTO: 2 X10*3/UL (ref 1.6–5.5)
NEUTROPHILS NFR BLD AUTO: 45.3 %
NRBC BLD-RTO: 0 /100 WBCS (ref 0–0)
PLATELET # BLD AUTO: 228 X10*3/UL (ref 150–450)
POTASSIUM SERPL-SCNC: 3.6 MMOL/L (ref 3.5–5.3)
PROT SERPL-MCNC: 6.5 G/DL (ref 6.4–8.2)
RBC # BLD AUTO: 4.32 X10*6/UL (ref 4.5–5.9)
SODIUM SERPL-SCNC: 142 MMOL/L (ref 136–145)
WBC # BLD AUTO: 4.4 X10*3/UL (ref 4.4–11.3)

## 2025-05-12 PROCEDURE — 84403 ASSAY OF TOTAL TESTOSTERONE: CPT

## 2025-05-12 PROCEDURE — 84154 ASSAY OF PSA FREE: CPT

## 2025-05-12 PROCEDURE — 36415 COLL VENOUS BLD VENIPUNCTURE: CPT

## 2025-05-12 PROCEDURE — 85025 COMPLETE CBC W/AUTO DIFF WBC: CPT

## 2025-05-12 PROCEDURE — 84153 ASSAY OF PSA TOTAL: CPT

## 2025-05-12 PROCEDURE — 83970 ASSAY OF PARATHORMONE: CPT

## 2025-05-12 PROCEDURE — 80053 COMPREHEN METABOLIC PANEL: CPT

## 2025-05-13 LAB
25(OH)D3+25(OH)D2 SERPL-MCNC: 57 NG/ML (ref 30–100)
ALBUMIN SERPL-MCNC: 4.3 G/DL (ref 3.6–5.1)
ALP SERPL-CCNC: 98 U/L (ref 35–144)
ALT SERPL-CCNC: 18 U/L (ref 9–46)
ANION GAP SERPL CALCULATED.4IONS-SCNC: 8 MMOL/L (CALC) (ref 7–17)
AST SERPL-CCNC: 19 U/L (ref 10–35)
BILIRUB SERPL-MCNC: 0.7 MG/DL (ref 0.2–1.2)
BUN SERPL-MCNC: 22 MG/DL (ref 7–25)
CALCIUM SERPL-MCNC: 9.7 MG/DL (ref 8.6–10.3)
CHLORIDE SERPL-SCNC: 108 MMOL/L (ref 98–110)
CHOLEST SERPL-MCNC: 156 MG/DL
CHOLEST/HDLC SERPL: 2.3 (CALC)
CO2 SERPL-SCNC: 27 MMOL/L (ref 20–32)
CREAT SERPL-MCNC: 0.81 MG/DL (ref 0.7–1.28)
EGFRCR SERPLBLD CKD-EPI 2021: 90 ML/MIN/1.73M2
ERYTHROCYTE [DISTWIDTH] IN BLOOD BY AUTOMATED COUNT: 13.1 % (ref 11–15)
EST. AVERAGE GLUCOSE BLD GHB EST-MCNC: 117 MG/DL
EST. AVERAGE GLUCOSE BLD GHB EST-SCNC: 6.5 MMOL/L
FOLATE SERPL-MCNC: 22.6 NG/ML
GLUCOSE SERPL-MCNC: 95 MG/DL (ref 65–99)
HBA1C MFR BLD: 5.7 %
HCT VFR BLD AUTO: 41.6 % (ref 38.5–50)
HDLC SERPL-MCNC: 67 MG/DL
HGB BLD-MCNC: 13.7 G/DL (ref 13.2–17.1)
IRON SATN MFR SERPL: 30 % (CALC) (ref 20–48)
IRON SERPL-MCNC: 100 MCG/DL (ref 50–180)
LDLC SERPL CALC-MCNC: 69 MG/DL (CALC)
MCH RBC QN AUTO: 31.6 PG (ref 27–33)
MCHC RBC AUTO-ENTMCNC: 32.9 G/DL (ref 32–36)
MCV RBC AUTO: 96.1 FL (ref 80–100)
NONHDLC SERPL-MCNC: 89 MG/DL (CALC)
PLATELET # BLD AUTO: 240 THOUSAND/UL (ref 140–400)
PMV BLD REES-ECKER: 10.8 FL (ref 7.5–12.5)
POTASSIUM SERPL-SCNC: 3.8 MMOL/L (ref 3.5–5.3)
PROT SERPL-MCNC: 6.4 G/DL (ref 6.1–8.1)
PTH-INTACT SERPL-MCNC: 64.4 PG/ML (ref 18.5–88)
RBC # BLD AUTO: 4.33 MILLION/UL (ref 4.2–5.8)
SODIUM SERPL-SCNC: 143 MMOL/L (ref 135–146)
TESTOST SERPL-MCNC: 456 NG/DL (ref 240–1000)
TIBC SERPL-MCNC: 337 MCG/DL (CALC) (ref 250–425)
TRIGL SERPL-MCNC: 110 MG/DL
TSH SERPL-ACNC: 3.19 MIU/L (ref 0.4–4.5)
VIT B12 SERPL-MCNC: 684 PG/ML (ref 200–1100)
WBC # BLD AUTO: 4.4 THOUSAND/UL (ref 3.8–10.8)

## 2025-05-14 LAB
PSA FREE MFR SERPL: 13 %
PSA FREE SERPL-MCNC: 0.4 NG/ML
PSA SERPL IA-MCNC: 3 NG/ML (ref 0–4)

## 2025-05-14 NOTE — RESULT ENCOUNTER NOTE
Your recent lab work: PTH level was acceptable. Please discuss details during your next office visit. Please keep your next appointment with Dr. Castellano as scheduled.  Dr. Camacho (covering for Dr. Castellano)

## 2025-05-16 ENCOUNTER — TELEPHONE (OUTPATIENT)
Dept: RADIATION ONCOLOGY | Facility: HOSPITAL | Age: 80
End: 2025-05-16
Payer: MEDICARE

## 2025-05-16 DIAGNOSIS — C61 MALIGNANT NEOPLASM OF PROSTATE (MULTI): Primary | ICD-10-CM

## 2025-05-19 ENCOUNTER — TELEPHONE (OUTPATIENT)
Dept: RADIATION ONCOLOGY | Facility: HOSPITAL | Age: 80
End: 2025-05-19
Payer: MEDICARE

## 2025-05-19 ASSESSMENT — ENCOUNTER SYMPTOMS
PSYCHIATRIC NEGATIVE: 1
DYSURIA: 0
COUGH: 0
UNEXPECTED WEIGHT CHANGE: 0
FATIGUE: 0
CHEST TIGHTNESS: 0
ARTHRALGIAS: 0
SHORTNESS OF BREATH: 0
ABDOMINAL PAIN: 0
HEMATURIA: 0
WEAKNESS: 0
RECTAL PAIN: 0
ALLERGIC/IMMUNOLOGIC NEGATIVE: 1
DIZZINESS: 0
CONSTIPATION: 0
FREQUENCY: 0
FEVER: 0
BACK PAIN: 0
JOINT SWELLING: 0
BLOOD IN STOOL: 0
DIARRHEA: 0
DIFFICULTY URINATING: 0
PALPITATIONS: 0
ANAL BLEEDING: 0

## 2025-05-19 NOTE — PROGRESS NOTES
"Cancer synopsis:  Rad/onc: Dr. Malloy/ [facility]    79 M initially presented with elevated PSA, transrectal US-guided prostate biopsy showed GS 4+3 disease in 3 samples, 9/12 cores positive. Enrolled in [facility]  010 assigned  to daro and adt 6m w/ SBRT.     PSA:  2/15/23 5.8  1/16/23 6.8  7/8/22 4.5     He had a prostate MRI and PSMA PET/CT showing no evidence of metastatic disease.     Decipher score 0.63     05/25/2023: Started ADT and daro 6m course     SBRT to prostate and SV 06/23/2023    Recent imaging:  MR of knee 04/2024: Meniscus tear and sprain  Ct chest wo 02/2024 dilated thoracic aorta     History of presenting illness:    Patient ID: [id number]     Ebenezer Campbell is a 79 y.o. male who presents for his unfavorable intermediate risk prostate cancer GS 4+3=7, now s/p SBRT to prostate and enrolled in [facility] .    RT Site: Prostate and SV  RT Date: 06/23/2023  Hormone therapy: Yes, completed 11/2023 with 6m of ADT and daro w/ testosterone recovery  Hot Flushes: Denies  Fatigue: Denies  Bone pain: Denies  ED: Admits to loss of sexual function. Had used Viagra with variable response. Admits orgasms where\"dry\" and states orgasm sensation had reduced however now finds its very hard impossible to obtain erections. Not concerned at this time however.  - Quality of erections during the last 4 weeks: 2 = Not firm enough for any sexual activity  - Use of erectile dysfunction medications:  Viagra (has cialis rx but not fill or used)  IPSS: 7  Urinary symptoms: Denies dysuria, hematuria. Denies weak stream or incomplete bladder emptying. Does report that with coffee intake increased urgency mild leakage of urine. Denies urgency outside of this. Denies nocturia  Urinary Medications: Yes, flowmax daily  Rectal bleeding: Admits one episode of rectal bleeding this week.***   Colonoscopy: 11/2024 diverticulosis, hemorrhoids, 4 sub-centimeter polyps removed, scattered angio ectasis noted in distal rectum r/t to recent " RT, next in 3yrs  Other systems: Denies SOB, CP or fever. Recent had r knee meniscus repair, recovering well.    Review of systems:  Review of Systems   Constitutional:  Negative for fatigue, fever and unexpected weight change.   Respiratory:  Negative for cough, chest tightness and shortness of breath.    Cardiovascular:  Negative for chest pain, palpitations and leg swelling.   Gastrointestinal:  Negative for abdominal pain, anal bleeding, blood in stool, constipation, diarrhea and rectal pain.   Endocrine: Negative for cold intolerance, heat intolerance and polyuria.   Genitourinary:  Negative for decreased urine volume, difficulty urinating, dysuria, frequency, hematuria and urgency.   Musculoskeletal:  Negative for arthralgias, back pain, gait problem and joint swelling.   Skin: Negative.    Allergic/Immunologic: Negative.    Neurological:  Negative for dizziness, syncope and weakness.   Psychiatric/Behavioral: Negative.       Past Medical history  Past Medical History:   Diagnosis Date    Contact with and (suspected) exposure to other viral communicable diseases 12/30/2014    Exposure to influenza    Dysphagia, unspecified 04/16/2013    Difficulty swallowing    Early cataracts, bilateral 04/18/2023    Hypokalemia 04/18/2023    Impacted cerumen, right ear 02/16/2017    Impacted cerumen of right ear    Obstructive sleep apnea (adult) (pediatric) 10/28/2015    Obstructive sleep apnea on CPAP    Other conditions influencing health status     Nephrolithiasis    Other enthesopathies, not elsewhere classified     Tendonitis of shoulder    Other fatigue     Tired    Other fracture of first lumbar vertebra, initial encounter for closed fracture (Multi)     Closed fracture of L1 vertebra    Other tear of medial meniscus, current injury, right knee, initial encounter 04/30/2024    Personal history of other diseases of the circulatory system 10/01/2015    History of hypertension    Personal history of other diseases of  the musculoskeletal system and connective tissue 2014    History of low back pain    Personal history of other diseases of the nervous system and sense organs 2015    History of sleep apnea    Personal history of other diseases of the respiratory system     History of allergic rhinitis    Personal history of other diseases of urinary system 10/28/2015    History of hematuria    Personal history of other specified conditions     History of edema    Residual hemorrhoidal skin tags 2014    External hemorrhoids    Snoring 2014    Snoring    Sprain of joints and ligaments of unspecified parts of neck, initial encounter 2014    Neck sprain    Unspecified visual loss     Vision problem    Vitamin D deficiency, unspecified     Vitamin D deficiency      Surgical/family history  Family History   Problem Relation Name Age of Onset    Other (natural causes) Mother           at 93    Aortic aneurysm Father           at 87,  natural causes    Dementia Father          AAA repair in his 60s    No Known Problems Sister      Diabetes Brother González          at 76 - arrested at home w/ chest pain    Prostate cancer Brother González         IDDM since his 20s    Heart disease Brother González     Heart attack Brother Bill     Other (heart stents) Brother Bill     Allergies Brother Bill     Other (cardiac stents) Brother Stephen     Parkinsonism Brother andrei     Kidney cancer Brother andrei     Prostate cancer Brother Abdoul     Other (elevated psa) Brother Aguilar     No Known Problems Daughter      No Known Problems Daughter      No Known Problems Daughter      Alopecia Son Maximilian     Cystic fibrosis Son Esteban         no CF symptoms    Coronary artery disease Other Family History       Past Surgical History:   Procedure Laterality Date    COLONOSCOPY  2012    Complete Colonoscopy    ESOPHAGOGASTRODUODENOSCOPY  10/28/2016    Esophagogastroduodenoscopy With Biopsy    KNEE ARTHROSCOPY W/ DEBRIDEMENT  2014     Arthroscopy Knee Left    OTHER SURGICAL HISTORY  01/22/2020    Cataract surgery    OTHER SURGICAL HISTORY  02/04/2022    Esophagogastroduodenoscopy    OTHER SURGICAL HISTORY  11/19/2021    Cardiac catheterization with stent placement    UMBILICAL HERNIA REPAIR  11/28/2012    Umbilical Hernia Repair      Social History  Tobacco Use: Low Risk  (4/10/2025)    Patient History     Smoking Tobacco Use: Never     Smokeless Tobacco Use: Never     Passive Exposure: Not on file       Current med list:  Current Outpatient Medications   Medication Instructions    aspirin 81 mg, Daily    atorvastatin (Lipitor) 80 mg tablet TAKE 1 TABLET BY MOUTH EVERY DAY FOR HIGH CHOLESTEROL    cetirizine (ZYRTEC) 10 mg, Daily PRN    cholecalciferol (Vitamin D-3) 50 mcg (2,000 unit) capsule 1 capsule, Daily    fluticasone (Flonase) 50 mcg/actuation nasal spray 1 spray, 2 times daily    losartan (COZAAR) 50 mg, oral, Daily    MULTIVITAMIN ORAL 1 capsule, Daily    omeprazole (PRILOSEC) 40 mg, oral, Daily before breakfast    tamsulosin (FLOMAX) 0.4 mg, oral, Daily, For prostate      Last recorded vital:  There were no vitals taken for this visit.    Physical Exam  Constitutional:       Appearance: Normal appearance.   Cardiovascular:      Rate and Rhythm: Normal rate.   Pulmonary:      Effort: Pulmonary effort is normal.      Breath sounds: Normal breath sounds.   Musculoskeletal:         General: Normal range of motion.      Cervical back: Normal range of motion.   Neurological:      Mental Status: He is alert and oriented to person, place, and time.   Psychiatric:         Mood and Affect: Mood normal.         Behavior: Behavior normal.         Thought Content: Thought content normal.         Judgment: Judgment normal.       Pertinent labs:  Prostate Specific AG   Date/Time Value Ref Range Status   11/15/2023 08:06 AM <0.01 <=4.00 ng/mL Final     PSA   Date/Time Value Ref Range Status   05/12/2025 07:02 AM 3.0 0.0 - 4.0 ng/mL Final      Comment:     INTERPRETIVE INFORMATION: Prostate Specific Antigen    The Roche PSA electrochemiluminescent immunoassay is used. Results   obtained with different test methods or kits cannot be used   interchangeably. The Roche PSA method is approved for use as an   aid in the detection of prostate cancer when used in conjunction   with a digital rectal exam in individuals with a prostate age 50   years and older. The Roche PSA is also indicated for the serial   measurement of PSA to aid in the prognosis and management of   prostate cancer patients. Elevated PSA concentrations can only   suggest the presence of prostate cancer until biopsy is performed.   PSA concentrations can also be elevated in benign prostatic   hyperplasia or inflammatory conditions of the prostate. PSA is   generally not elevated in healthy individuals or individuals with   nonprostatic carcinoma.     Dx:  Problem List Items Addressed This Visit    None    PSA of 0.20 was reviewed and is stable. Testerone is currently 411 and is within normal ranges. CBC stable, mild anemia likely r/t to treatment and improving.  Review of latent SE including rectal bleeding, hematuria, urinary strictures, ED where reviewed as well as how to contact office if s/s present. Denies latent SE. Does report some ongoing ED however had issues prior. Likely current ED loss is caused by low Testerone. NCCN guidelines where reviewed and routine FUV of every 3m for first year and every 6m for four years for a total of five years was discussed. Patient verbalized understanding.      Cialis 10 mg PRN sexual function. Discussed can increased to 20 mg if needed for sexual function. Will consider. Reports on 10mg mild effect at this time.    Reviewed supplementation with iron daily until next visit if patient would like to help with mild anemia however trending upward and almost resolved. Reviewed could cause constipation and usage of colace is recmonded to help.     PLAN:  FUV  6m per NRG  010  Labs CBC, CMP, PSA and testerone  Imaging none  Flowmax daily  Cialis 10mg PRN  FUV other providers: PCP for routine evals    Please contact office with any concerns:  Yan Salcedo CNP  [phone]

## 2025-05-20 ENCOUNTER — HOSPITAL ENCOUNTER (OUTPATIENT)
Dept: RADIATION ONCOLOGY | Facility: HOSPITAL | Age: 80
Setting detail: RADIATION/ONCOLOGY SERIES
Discharge: HOME | End: 2025-05-20
Payer: MEDICARE

## 2025-05-26 DIAGNOSIS — E78.5 DYSLIPIDEMIA, GOAL LDL BELOW 70: ICD-10-CM

## 2025-05-26 RX ORDER — ATORVASTATIN CALCIUM 80 MG/1
80 TABLET, FILM COATED ORAL DAILY
Qty: 90 TABLET | Refills: 3 | Status: SHIPPED | OUTPATIENT
Start: 2025-05-26

## 2025-05-29 ENCOUNTER — HOSPITAL ENCOUNTER (OUTPATIENT)
Dept: RADIOLOGY | Facility: HOSPITAL | Age: 80
Discharge: HOME | End: 2025-05-29
Payer: MEDICARE

## 2025-05-29 DIAGNOSIS — C61 MALIGNANT NEOPLASM OF PROSTATE (MULTI): ICD-10-CM

## 2025-05-29 PROCEDURE — 3430000001 HC RX 343 DIAGNOSTIC RADIOPHARMACEUTICALS: Mod: TB

## 2025-05-29 PROCEDURE — 78815 PET IMAGE W/CT SKULL-THIGH: CPT | Mod: PS

## 2025-05-29 PROCEDURE — A9596 HC RX 343 DIAGNOSTIC RADIOPHARMACEUTICALS: HCPCS | Mod: TB

## 2025-05-29 RX ADMIN — KIT FOR THE PREPARATION OF GALLIUM GA 68 GOZETOTIDE INJECTION 5.94 MILLICURIE: KIT INTRAVENOUS at 09:22

## 2025-05-30 ENCOUNTER — TELEPHONE (OUTPATIENT)
Dept: RADIATION ONCOLOGY | Facility: HOSPITAL | Age: 80
End: 2025-05-30
Payer: MEDICARE

## 2025-06-02 ASSESSMENT — ENCOUNTER SYMPTOMS
ARTHRALGIAS: 0
FREQUENCY: 0
SHORTNESS OF BREATH: 0
ABDOMINAL PAIN: 0
ANAL BLEEDING: 0
JOINT SWELLING: 0
CHEST TIGHTNESS: 0
RECTAL PAIN: 0
BACK PAIN: 0
DIZZINESS: 0
FEVER: 0
PSYCHIATRIC NEGATIVE: 1
ALLERGIC/IMMUNOLOGIC NEGATIVE: 1
WEAKNESS: 0
DIARRHEA: 0
HEMATURIA: 0
FATIGUE: 0
CONSTIPATION: 0
DIFFICULTY URINATING: 0
PALPITATIONS: 0
DYSURIA: 0
UNEXPECTED WEIGHT CHANGE: 0
BLOOD IN STOOL: 0
COUGH: 0

## 2025-06-02 NOTE — PROGRESS NOTES
"Cancer synopsis:  Rad/onc: Dr. Malloy/ Karen GUILLEN    79 M initially presented with elevated PSA, transrectal US-guided prostate biopsy showed GS 4+3 disease in 3 samples, 9/12 cores positive. Enrolled in [facility]  010 assigned  to daro and adt 6m w/ SBRT.     PSA:  2/15/23 5.8  1/16/23 6.8  7/8/22 4.5     He had a prostate MRI and PSMA PET/CT showing no evidence of metastatic disease.     Decipher score 0.63     05/25/2023: Started ADT and daro 6m course     SBRT to prostate and SV 06/23/2023    Recent imaging:  MR of knee 04/2024: Meniscus tear and sprain  Ct chest wo 02/2024 dilated thoracic aorta     History of presenting illness:    Patient ID: [id number]     Ebenezer Campbell is a 79 y.o. 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    RT Site: Prostate and SV  RT Date: 06/23/2023  Hormone therapy: Yes, completed 11/2023 with 6m of ADT and daro w/ testosterone recovery  Hot Flushes: Denies  Fatigue: Denies  Bone pain: Denies  ED: Admits to loss of sexual function. Had used Viagra with variable response. Admits orgasms where\"dry\" and states orgasm sensation had reduced however now finds its very hard impossible to obtain erections. Not concerned at this time however.  - Quality of erections during the last 4 weeks: 2 = Not firm enough for any sexual activity  - Use of erectile dysfunction medications:  Viagra (has cialis rx but not fill or used)  IPSS: 11  Urinary symptoms: Denies dysuria, hematuria. Denies weak stream or incomplete bladder emptying. Does report that with coffee intake increased urgency mild leakage of urine. Denies urgency outside of this. Denies nocturia  Urinary Medications: Yes, flowmax daily  Rectal bleeding: Denies further episodes of rectal bleeding  Colonoscopy: 11/2024 diverticulosis, hemorrhoids, 4 sub-centimeter polyps removed, scattered angio ectasis noted in distal rectum r/t to recent RT, next in " 3yrs  Other systems: Denies SOB, CP or fever. Recent had r knee meniscus repair, recovering well.    Review of systems:  Review of Systems   Constitutional:  Negative for fatigue, fever and unexpected weight change.   Respiratory:  Negative for cough, chest tightness and shortness of breath.    Cardiovascular:  Negative for chest pain, palpitations and leg swelling.   Gastrointestinal:  Negative for abdominal pain, anal bleeding, blood in stool, constipation, diarrhea and rectal pain.   Endocrine: Negative for cold intolerance, heat intolerance and polyuria.   Genitourinary:  Negative for decreased urine volume, difficulty urinating, dysuria, frequency, hematuria and urgency.   Musculoskeletal:  Negative for arthralgias, back pain, gait problem and joint swelling.   Skin: Negative.    Allergic/Immunologic: Negative.    Neurological:  Negative for dizziness, syncope and weakness.   Psychiatric/Behavioral: Negative.       Past Medical history  Past Medical History:   Diagnosis Date    Contact with and (suspected) exposure to other viral communicable diseases 12/30/2014    Exposure to influenza    Dysphagia, unspecified 04/16/2013    Difficulty swallowing    Early cataracts, bilateral 04/18/2023    Hypokalemia 04/18/2023    Impacted cerumen, right ear 02/16/2017    Impacted cerumen of right ear    Obstructive sleep apnea (adult) (pediatric) 10/28/2015    Obstructive sleep apnea on CPAP    Other conditions influencing health status     Nephrolithiasis    Other enthesopathies, not elsewhere classified     Tendonitis of shoulder    Other fatigue     Tired    Other fracture of first lumbar vertebra, initial encounter for closed fracture (Multi)     Closed fracture of L1 vertebra    Other tear of medial meniscus, current injury, right knee, initial encounter 04/30/2024    Personal history of other diseases of the circulatory system 10/01/2015    History of hypertension    Personal history of other diseases of the  musculoskeletal system and connective tissue 2014    History of low back pain    Personal history of other diseases of the nervous system and sense organs 2015    History of sleep apnea    Personal history of other diseases of the respiratory system     History of allergic rhinitis    Personal history of other diseases of urinary system 10/28/2015    History of hematuria    Personal history of other specified conditions     History of edema    Residual hemorrhoidal skin tags 2014    External hemorrhoids    Snoring 2014    Snoring    Sprain of joints and ligaments of unspecified parts of neck, initial encounter 2014    Neck sprain    Unspecified visual loss     Vision problem    Vitamin D deficiency, unspecified     Vitamin D deficiency      Surgical/family history  Family History   Problem Relation Name Age of Onset    Other (natural causes) Mother           at 93    Aortic aneurysm Father           at 87,  natural causes    Dementia Father          AAA repair in his 60s    No Known Problems Sister      Diabetes Brother González          at 76 - arrested at home w/ chest pain    Prostate cancer Brother González         IDDM since his 20s    Heart disease Brother González     Heart attack Brother Bill     Other (heart stents) Brother Bill     Allergies Brother Bill     Other (cardiac stents) Brother Stephen     Parkinsonism Brother andrei     Kidney cancer Brother andrei     Prostate cancer Brother Abdoul     Other (elevated psa) Brother Aguilar     No Known Problems Daughter      No Known Problems Daughter      No Known Problems Daughter      Alopecia Son Maximilian     Cystic fibrosis Son Esteban         no CF symptoms    Coronary artery disease Other Family History       Past Surgical History:   Procedure Laterality Date    COLONOSCOPY  2012    Complete Colonoscopy    ESOPHAGOGASTRODUODENOSCOPY  10/28/2016    Esophagogastroduodenoscopy With Biopsy    KNEE ARTHROSCOPY W/ DEBRIDEMENT  2014     Arthroscopy Knee Left    OTHER SURGICAL HISTORY  01/22/2020    Cataract surgery    OTHER SURGICAL HISTORY  02/04/2022    Esophagogastroduodenoscopy    OTHER SURGICAL HISTORY  11/19/2021    Cardiac catheterization with stent placement    UMBILICAL HERNIA REPAIR  11/28/2012    Umbilical Hernia Repair      Social History  Tobacco Use: Low Risk  (4/10/2025)    Patient History     Smoking Tobacco Use: Never     Smokeless Tobacco Use: Never     Passive Exposure: Not on file       Current med list:  Current Outpatient Medications   Medication Instructions    aspirin 81 mg, Daily    atorvastatin (LIPITOR) 80 mg, oral, Daily, for high cholesterol    cetirizine (ZYRTEC) 10 mg, Daily PRN    cholecalciferol (Vitamin D-3) 50 mcg (2,000 unit) capsule 1 capsule, Daily    fluticasone (Flonase) 50 mcg/actuation nasal spray 1 spray, 2 times daily    losartan (COZAAR) 50 mg, oral, Daily    MULTIVITAMIN ORAL 1 capsule, Daily    omeprazole (PRILOSEC) 40 mg, oral, Daily before breakfast    tamsulosin (FLOMAX) 0.4 mg, oral, Daily, For prostate      Last recorded vital:  There were no vitals taken for this visit.    Physical Exam  Constitutional:       Appearance: Normal appearance.   Cardiovascular:      Rate and Rhythm: Normal rate.   Pulmonary:      Effort: Pulmonary effort is normal.      Breath sounds: Normal breath sounds.   Musculoskeletal:         General: Normal range of motion.      Cervical back: Normal range of motion.   Neurological:      Mental Status: He is alert and oriented to person, place, and time.   Psychiatric:         Mood and Affect: Mood normal.         Behavior: Behavior normal.         Thought Content: Thought content normal.         Judgment: Judgment normal.       Pertinent labs:  Prostate Specific AG   Date/Time Value Ref Range Status   11/15/2023 08:06 AM <0.01 <=4.00 ng/mL Final     PSA   Date/Time Value Ref Range Status   05/12/2025 07:02 AM 3.0 0.0 - 4.0 ng/mL Final     Comment:     INTERPRETIVE  INFORMATION: Prostate Specific Antigen    The Roche PSA electrochemiluminescent immunoassay is used. Results   obtained with different test methods or kits cannot be used   interchangeably. The Roche PSA method is approved for use as an   aid in the detection of prostate cancer when used in conjunction   with a digital rectal exam in individuals with a prostate age 50   years and older. The Roche PSA is also indicated for the serial   measurement of PSA to aid in the prognosis and management of   prostate cancer patients. Elevated PSA concentrations can only   suggest the presence of prostate cancer until biopsy is performed.   PSA concentrations can also be elevated in benign prostatic   hyperplasia or inflammatory conditions of the prostate. PSA is   generally not elevated in healthy individuals or individuals with   nonprostatic carcinoma.     Dx:  Problem List Items Addressed This Visit    None  Psa 3.0 has risen about 2.70. Concern for disease recurrence. Recent PET imaging did no demonstrate any ongoing disease. Reviewed potential for Psa bounce at this time point even at 24m out and follow up PSA in 4wks to re-assess Psa value.    Review of latent SE including rectal bleeding, hematuria, urinary strictures, ED where reviewed as well as how to contact office if s/s present. Denies latent SE. Does report some ongoing ED however had issues prior. Likely current ED loss is caused by low Testerone. NCCN guidelines where reviewed and routine FUV of every 3m for first year and every 6m for four years for a total of five years was discussed. Patient verbalized understanding.      ED:  Cialis 10 mg PRN sexual function. Discussed can increased to 20 mg if needed for sexual function. Will consider. Reports on 10mg mild effect at this time.    PLAN:  FUV will be decided by psa in 4wks, routine trial FUV per NRG  010  Labs CBC, CMP, PSA and testerone  Imaging none  Flowmax daily  Cialis 10mg PRN  FUV other providers:  PCP for routine evals    Please contact office with any concerns:  Yan Salcedo CNP  [phone]

## 2025-06-03 ENCOUNTER — HOSPITAL ENCOUNTER (OUTPATIENT)
Dept: RADIATION ONCOLOGY | Facility: HOSPITAL | Age: 80
Setting detail: RADIATION/ONCOLOGY SERIES
Discharge: HOME | End: 2025-06-03
Payer: MEDICARE

## 2025-06-03 ENCOUNTER — DOCUMENTATION (OUTPATIENT)
Dept: RADIATION ONCOLOGY | Facility: HOSPITAL | Age: 80
End: 2025-06-03
Payer: MEDICARE

## 2025-06-03 VITALS
OXYGEN SATURATION: 94 % | RESPIRATION RATE: 18 BRPM | WEIGHT: 209.5 LBS | BODY MASS INDEX: 31.85 KG/M2 | DIASTOLIC BLOOD PRESSURE: 74 MMHG | SYSTOLIC BLOOD PRESSURE: 130 MMHG | HEART RATE: 61 BPM | TEMPERATURE: 97.3 F

## 2025-06-03 DIAGNOSIS — C61 MALIGNANT NEOPLASM OF PROSTATE (MULTI): Primary | ICD-10-CM

## 2025-06-03 DIAGNOSIS — R97.21 RISING PSA FOLLOWING TREATMENT FOR MALIGNANT NEOPLASM OF PROSTATE: ICD-10-CM

## 2025-06-03 PROCEDURE — 99215 OFFICE O/P EST HI 40 MIN: CPT

## 2025-06-03 ASSESSMENT — ENCOUNTER SYMPTOMS
DEPRESSION: 0
LOSS OF SENSATION IN FEET: 0
OCCASIONAL FEELINGS OF UNSTEADINESS: 0

## 2025-06-03 ASSESSMENT — PAIN SCALES - GENERAL: PAINLEVEL_OUTOF10: 0-NO PAIN

## 2025-06-03 NOTE — RESEARCH NOTES
STUDY: NRG-  VISIT: 24M    Clinical Research Specialist saw patient in clinic today.    Adverse Events and Concomitant Medications assessed.    QOLs completed by patient without assistance.     Next appointment and contact information provided.    All questions answered to patient satisfaction.    Connie Eddy  06/03/2025

## 2025-06-06 ENCOUNTER — APPOINTMENT (OUTPATIENT)
Facility: CLINIC | Age: 80
End: 2025-06-06
Payer: MEDICARE

## 2025-06-06 VITALS
HEIGHT: 69 IN | OXYGEN SATURATION: 98 % | HEART RATE: 58 BPM | SYSTOLIC BLOOD PRESSURE: 132 MMHG | WEIGHT: 213.2 LBS | RESPIRATION RATE: 18 BRPM | DIASTOLIC BLOOD PRESSURE: 72 MMHG | BODY MASS INDEX: 31.58 KG/M2

## 2025-06-06 DIAGNOSIS — E66.9 OBESITY (BMI 30-39.9): ICD-10-CM

## 2025-06-06 DIAGNOSIS — G47.33 OSA (OBSTRUCTIVE SLEEP APNEA): Primary | ICD-10-CM

## 2025-06-06 ASSESSMENT — SLEEP AND FATIGUE QUESTIONNAIRES
HOW LIKELY ARE YOU TO NOD OFF OR FALL ASLEEP WHILE WATCHING TV: SLIGHT CHANCE OF DOZING
HOW LIKELY ARE YOU TO NOD OFF OR FALL ASLEEP WHILE SITTING QUIETLY AFTER LUNCH WITHOUT ALCOHOL: WOULD NEVER DOZE
HOW LIKELY ARE YOU TO NOD OFF OR FALL ASLEEP WHILE SITTING AND TALKING TO SOMEONE: WOULD NEVER DOZE
HOW LIKELY ARE YOU TO NOD OFF OR FALL ASLEEP WHILE SITTING AND READING: SLIGHT CHANCE OF DOZING
ESS TOTAL SCORE: 2
HOW LIKELY ARE YOU TO NOD OFF OR FALL ASLEEP WHILE SITTING INACTIVE IN A PUBLIC PLACE: WOULD NEVER DOZE
HOW LIKELY ARE YOU TO NOD OFF OR FALL ASLEEP IN A CAR, WHILE STOPPED FOR A FEW MINUTES IN TRAFFIC: WOULD NEVER DOZE
HOW LIKELY ARE YOU TO NOD OFF OR FALL ASLEEP WHILE LYING DOWN TO REST IN THE AFTERNOON WHEN CIRCUMSTANCES PERMIT: WOULD NEVER DOZE
HOW LIKELY ARE YOU TO NOD OFF OR FALL ASLEEP WHEN YOU ARE A PASSENGER IN A CAR FOR AN HOUR WITHOUT A BREAK: WOULD NEVER DOZE

## 2025-06-06 NOTE — PROGRESS NOTES
Patient: Ebenezer Campbell    [id number]  : 1945 -- AGE 79 y.o.    Provider: Daren Hernández DO     [facility]   Service Date: 2025              [facility]  Follow up Visit Note        HISTORY OF PRESENT ILLNESS     HISTORY OF PRESENT ILLNESS   Ebenezer Campbell is a 79 y.o. male who presents to a [facility] for a sleep medicine evaluation with concerns of Follow-up, Sleep Apnea, and low pulse ox (Low readings during sleep, as low as 73%).     The patient  has a past medical history of Contact with and (suspected) exposure to other viral communicable diseases (2014), Dysphagia, unspecified (2013), Early cataracts, bilateral (2023), Hypokalemia (2023), Impacted cerumen, right ear (2017), Obstructive sleep apnea (adult) (pediatric) (10/28/2015), Other conditions influencing health status, Other enthesopathies, not elsewhere classified, Other fatigue, Other fracture of first lumbar vertebra, initial encounter for closed fracture (Multi), Other tear of medial meniscus, current injury, right knee, initial encounter (2024), Personal history of other diseases of the circulatory system (10/01/2015), Personal history of other diseases of the musculoskeletal system and connective tissue (2014), Personal history of other diseases of the nervous system and sense organs (2015), Personal history of other diseases of the respiratory system, Personal history of other diseases of urinary system (10/28/2015), Personal history of other specified conditions, Residual hemorrhoidal skin tags (2014), Snoring (2014), Sprain of joints and ligaments of unspecified parts of neck, initial encounter (2014), Unspecified visual loss, and Vitamin D deficiency, unspecified..    PAST SLEEP HISTORY    Patient had a sleep study done due to snoring and night time awakenings. He was  started on pap therapy but could not tolerate it for more than a few weeks. His most recent sleep study was in 2021; he received a new machine but felt that the pressure was too high when the ramp ended. He also struggled with a mask air leak.      CURRENT HISTORY     6/13/24 the patient reports that he is establishing care for his sleep apnea. He would like to restart pap therapy.     6/6/25   Patient has been trying to use pap therapy regularly. Patient is concerned that his oxygen levels may be getting too low while he is sleeping. He has a smart watch which showed brief desaturations.     Pap benefit: decreased/ eliminated: snoring/ night time awakenings.      Pap issues: denies dry mouth, denies skin irritation, admits to sometimes perceived mask air leak.     Sleep schedule  on weekdays / work days:  Usual Bedtime: 10pm  Sleep latency: 1-2hrs, stays in bed.   Wake time : 6 am  Total sleep time average/day: 6 hours/day  Awakenings: 2-3x per night, unknown/ nocturia, variable length.   Naps: dozes sometimes while watching TV.       Sleep schedule  on weekends/non work days :  Same as above.     Sleep aids: tylenol pm, sometimes it helps him fall asleep faster and stay asleep.   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     Preferred sleeping position: SLEEP POSITION: sidelying    Sleep-related ROS:    Snoring:  n  Witnessed apneas: n       Gasping/ choking: n       Am Dry mouth:    n        Nasal congestion: sometimes, zyrtec.        am headaches: n    Sleep is described as unrefreshing.     Daytime sleepiness: y  Fatigue or decreased energy: y  Difficulty remembering things in daytime: sometimes    Drowsy driving: n  Hx of car accident: n  Near-miss Car accident: n      RLS screen:  RLSSCREEN: - Sensations: Patient does not have unusual sensations in their extremities that cause an urge to move them     Sleep-related behaviors: DENIES    ESS: 3        REVIEW OF SYSTEMS     REVIEW OF SYSTEMS  Review of Systems   All other systems reviewed and are negative.      ALLERGIES AND MEDICATIONS     ALLERGIES  No Known Allergies    MEDICATIONS  Current Outpatient Medications   Medication Sig Dispense Refill    aspirin 81 mg EC tablet Take 1 tablet (81 mg) by mouth once daily.      atorvastatin (Lipitor) 80 mg tablet Take 1 tablet (80 mg) by mouth once daily. for high cholesterol 90 tablet 3    cetirizine (ZyrTEC) 5 mg tablet Take 2 tablets (10 mg) by mouth once daily as needed for allergies.      cholecalciferol (Vitamin D-3) 50 mcg (2,000 unit) capsule Take 1 capsule (50 mcg) by mouth once daily.      fluticasone (Flonase) 50 mcg/actuation nasal spray Administer 1 spray into affected nostril(s) 2 times a day.      losartan (Cozaar) 50 mg tablet Take 1 tablet (50 mg) by mouth once daily. 90 tablet 3    MULTIVITAMIN ORAL Take 1 capsule by mouth once daily.      omeprazole (PriLOSEC) 40 mg DR capsule TAKE 1 CAPSULE (40 MG) BY MOUTH ONCE DAILY IN THE MORNING. TAKE BEFORE MEALS. 90 capsule 3    tamsulosin (Flomax) 0.4 mg 24 hr capsule Take 1 capsule (0.4 mg) by mouth once daily. For prostate 90 capsule 3     No current facility-administered medications for this visit.         PAST HISTORY     PAST MEDICAL HISTORY  He  has a past medical history of Contact with and (suspected) exposure to other viral communicable diseases (12/30/2014), Dysphagia, unspecified (04/16/2013), Early cataracts, bilateral (04/18/2023), Hypokalemia (04/18/2023), Impacted cerumen, right ear (02/16/2017), Obstructive sleep apnea (adult) (pediatric) (10/28/2015), Other conditions influencing health status, Other enthesopathies, not elsewhere classified, Other fatigue, Other fracture of first lumbar vertebra, initial encounter for closed fracture (Multi), Other tear of medial meniscus, current injury, right knee, initial encounter (04/30/2024), Personal history of other diseases of the circulatory  system (10/01/2015), Personal history of other diseases of the musculoskeletal system and connective tissue (2014), Personal history of other diseases of the nervous system and sense organs (2015), Personal history of other diseases of the respiratory system, Personal history of other diseases of urinary system (10/28/2015), Personal history of other specified conditions, Residual hemorrhoidal skin tags (2014), Snoring (2014), Sprain of joints and ligaments of unspecified parts of neck, initial encounter (2014), Unspecified visual loss, and Vitamin D deficiency, unspecified.      PAST SURGICAL HISTORY:  Past Surgical History:   Procedure Laterality Date    COLONOSCOPY  2012    Complete Colonoscopy    ESOPHAGOGASTRODUODENOSCOPY  10/28/2016    Esophagogastroduodenoscopy With Biopsy    KNEE ARTHROSCOPY W/ DEBRIDEMENT  2014    Arthroscopy Knee Left    OTHER SURGICAL HISTORY  2020    Cataract surgery    OTHER SURGICAL HISTORY  2022    Esophagogastroduodenoscopy    OTHER SURGICAL HISTORY  2021    Cardiac catheterization with stent placement    UMBILICAL HERNIA REPAIR  2012    Umbilical Hernia Repair       FAMILY HISTORY  Family History   Problem Relation Name Age of Onset    Other (natural causes) Mother           at 93    Aortic aneurysm Father           at 87,  natural causes    Dementia Father          AAA repair in his 60s    No Known Problems Sister      Diabetes Brother González          at 76 - arrested at home w/ chest pain    Prostate cancer Brother González         IDDM since his 20s    Heart disease Brother González     Heart attack Brother Bill     Other (heart stents) Brother Bill     Allergies Brother Bill     Other (cardiac stents) Brother Stephen     Parkinsonism Brother andrei     Kidney cancer Brother andrei     Prostate cancer Brother Abdoul     Other (elevated psa) Brother Aguilar     No Known Problems Daughter      No Known Problems Daughter       "No Known Problems Daughter      Alopecia Son Maximilian     Cystic fibrosis Son Esteban         no CF symptoms    Coronary artery disease Other Family History      DOES/DOES NOT EC: does not have a family history of sleep disorder.      SOCIAL HISTORY  He  reports that he has never smoked. He has never used smokeless tobacco. He reports current alcohol use. He reports that he does not use drugs.     Caffeine consumption: 2-3 cups coffee in am.       PHYSICAL EXAM     VITAL SIGNS: /72   Pulse 58   Resp 18   Ht 1.74 m (5' 8.5\")   Wt 96.7 kg (213 lb 3.2 oz)   SpO2 98%   BMI 31.95 kg/m²      PREVIOUS WEIGHTS:  Wt Readings from Last 3 Encounters:   06/06/25 96.7 kg (213 lb 3.2 oz)   06/03/25 95 kg (209 lb 8 oz)   04/10/25 95.7 kg (211 lb)       Physical Exam  Constitutional: Alert and oriented, cooperative, no obvious distress.   HENT: normocephalic.   Neurologic: AOx3.   psychiatric: appropriate mood and affect.  Integumentary: no significant rashes observed over pap mask area.       RESULTS/DATA     IRON, TOTAL (mcg/dL)   Date Value   05/12/2025 100     Iron (ug/dL)   Date Value   11/15/2023 101     % SATURATION (% (calc))   Date Value   05/12/2025 30     % Saturation (%)   Date Value   11/15/2023 32     IRON BINDING CAPACITY (mcg/dL (calc))   Date Value   05/12/2025 337     TIBC (ug/dL)   Date Value   11/15/2023 317               ASSESSMENT/PLAN     Mr. Campbell is a 79 y.o. male and  has a past medical history of Contact with and (suspected) exposure to other viral communicable diseases (12/30/2014), Dysphagia, unspecified (04/16/2013), Early cataracts, bilateral (04/18/2023), Hypokalemia (04/18/2023), Impacted cerumen, right ear (02/16/2017), Obstructive sleep apnea (adult) (pediatric) (10/28/2015), Other conditions influencing health status, Other enthesopathies, not elsewhere classified, Other fatigue, Other fracture of first lumbar vertebra, initial encounter for closed fracture (Multi), Other tear of medial " meniscus, current injury, right knee, initial encounter (04/30/2024), Personal history of other diseases of the circulatory system (10/01/2015), Personal history of other diseases of the musculoskeletal system and connective tissue (12/16/2014), Personal history of other diseases of the nervous system and sense organs (02/06/2015), Personal history of other diseases of the respiratory system, Personal history of other diseases of urinary system (10/28/2015), Personal history of other specified conditions, Residual hemorrhoidal skin tags (04/23/2014), Snoring (05/16/2014), Sprain of joints and ligaments of unspecified parts of neck, initial encounter (12/19/2014), Unspecified visual loss, and Vitamin D deficiency, unspecified. He is following up on sleep apnea.     Problem List Items Addressed This Visit    None    Problem List and Orders    Pmhx includes seasonal allergies, HTN, GERD.     1- PIOTR  PSG 1/4/21 --> severe PIOTR worse in the supine position, AHI 32.     Reviewed and discussed the above sleep study results and management options in details. All questions answered, patient verbalizes understanding.     -previously adjusted settings from Autopap 8-15 cwp, rAHI 4.2, median pressure 9.1, max. Avg 10.5, poor compliance only 2 days of usage in 2022--> to autopap 4-15cwp as he feels that pressure is too high. Also adjusted ramp from auto to 45min at 4cwp.     Today 6/6/25, patient feels that he can tolerate a higher setting, adjusting from Autopap 4-15cwp, rAHI 5.3 (some days with higher residual AHI ) to Autopap 7-15cwp.     -counseled on the importance of compliance.   -ordered supplies through [facility]  -ordered mask refitting.   -Ordered nocturnal pulse oximetry to be done while using PAP therapy, patient is concerned about his oxygenation has noted some desaturations on his smart watch.   - obtaining a download in 1 month.     -do not drive or operate heavy machinery if drowsy.  -avoid sleeping on your back.    -avoid sedating substances/ medication, alcohol, illicit drugs and tobacco.    2- Obesity  counseled on eating a healthy diet and exercising as tolerated.    Follow up in 6 months or sooner as needed.

## 2025-06-20 DIAGNOSIS — G47.33 OSA (OBSTRUCTIVE SLEEP APNEA): Primary | ICD-10-CM

## 2025-06-20 NOTE — PROGRESS NOTES
Patient's pox done 6/13/25 on Autopap 7-15cwp showing spO2 <= 88% 1hr 20min 10sec. --> adjusting settings further to Autopap 10-15cwp.   Ordered nocturnal pox to be done while on the new setting on room air.

## 2025-06-30 ENCOUNTER — LAB (OUTPATIENT)
Dept: LAB | Facility: HOSPITAL | Age: 80
End: 2025-06-30
Payer: MEDICARE

## 2025-06-30 DIAGNOSIS — C61 MALIGNANT NEOPLASM OF PROSTATE (MULTI): ICD-10-CM

## 2025-06-30 DIAGNOSIS — C61 MALIGNANT NEOPLASM OF PROSTATE (MULTI): Primary | ICD-10-CM

## 2025-06-30 PROCEDURE — 84153 ASSAY OF PSA TOTAL: CPT

## 2025-07-01 DIAGNOSIS — C61 MALIGNANT NEOPLASM OF PROSTATE (MULTI): Primary | ICD-10-CM

## 2025-07-01 LAB — PSA SERPL-MCNC: 0.38 NG/ML

## 2025-07-11 ENCOUNTER — PATIENT MESSAGE (OUTPATIENT)
Facility: CLINIC | Age: 80
End: 2025-07-11
Payer: MEDICARE

## 2025-07-17 ENCOUNTER — APPOINTMENT (OUTPATIENT)
Facility: CLINIC | Age: 80
End: 2025-07-17
Payer: MEDICARE

## 2025-08-04 ENCOUNTER — OFFICE VISIT (OUTPATIENT)
Dept: CARDIOLOGY | Facility: CLINIC | Age: 80
End: 2025-08-04
Payer: MEDICARE

## 2025-08-04 VITALS
DIASTOLIC BLOOD PRESSURE: 61 MMHG | HEIGHT: 69 IN | HEART RATE: 55 BPM | BODY MASS INDEX: 30.81 KG/M2 | WEIGHT: 208 LBS | OXYGEN SATURATION: 95 % | SYSTOLIC BLOOD PRESSURE: 100 MMHG

## 2025-08-04 DIAGNOSIS — I25.118 CORONARY ARTERY DISEASE OF NATIVE ARTERY OF NATIVE HEART WITH STABLE ANGINA PECTORIS: Primary | ICD-10-CM

## 2025-08-04 PROCEDURE — 93005 ELECTROCARDIOGRAM TRACING: CPT | Performed by: INTERNAL MEDICINE

## 2025-08-04 PROCEDURE — 99212 OFFICE O/P EST SF 10 MIN: CPT

## 2025-08-04 ASSESSMENT — PAIN SCALES - GENERAL: PAINLEVEL_OUTOF10: 0-NO PAIN

## 2025-08-04 ASSESSMENT — COLUMBIA-SUICIDE SEVERITY RATING SCALE - C-SSRS
1. IN THE PAST MONTH, HAVE YOU WISHED YOU WERE DEAD OR WISHED YOU COULD GO TO SLEEP AND NOT WAKE UP?: NO
6. HAVE YOU EVER DONE ANYTHING, STARTED TO DO ANYTHING, OR PREPARED TO DO ANYTHING TO END YOUR LIFE?: NO
2. HAVE YOU ACTUALLY HAD ANY THOUGHTS OF KILLING YOURSELF?: NO

## 2025-08-04 ASSESSMENT — ENCOUNTER SYMPTOMS
DEPRESSION: 0
OCCASIONAL FEELINGS OF UNSTEADINESS: 0
LOSS OF SENSATION IN FEET: 0

## 2025-08-04 NOTE — PROGRESS NOTES
Primary Care Physician: Oral Castellano MD  Date of Visit: 08/04/2025 10:40 AM EDT  Location of visit: [facility] [place] [address]     Chief Complaint:   Chief complaint ASCVD evaluation and management.  Recent clinical course and laboratory studies reviewed       HPI / Summary:   Ebenezer Campbell is a 79 y.o. male presents for new cardiovascular evaluation. No ref. provider found   November 2020 OCT guided PCI of the proximal and mid LAD with 2 drug-eluting stents 2 5x38 mm and 2 5x18 mm    Prostate CA with TURP, ADT ,SBRT    June 30 PSA of 0.38 down from 3.0 in May 2025, may normal CMP, May cholesterol 156 LDL 69 triglycerides 110    Walking 5 miles, active with machines    Specialty Problems          Cardiology Problems    Agatston coronary artery calcium score greater than 400    Coronary artery disease of native heart with stable angina pectoris    Dyslipidemia, goal LDL below 70    Essential hypertension with goal blood pressure less than 130/85    Peripheral vascular disease    Presence of drug coated stent in LAD coronary artery        Medical History[1]       Surgical History[2]       Social History:   reports that he has never smoked. He has never used smokeless tobacco. He reports current alcohol use of about 2.0 standard drinks of alcohol per week. He reports that he does not use drugs.      Allergies:  RX Allergies[3]    Outpatient Medications:  Current Outpatient Medications   Medication Instructions    aspirin 81 mg, Daily    atorvastatin (LIPITOR) 80 mg, oral, Daily, for high cholesterol    cetirizine (ZYRTEC) 10 mg, Daily PRN    cholecalciferol (Vitamin D-3) 50 mcg (2,000 unit) capsule 1 capsule, Daily    fluticasone (Flonase) 50 mcg/actuation nasal spray 1 spray, 2 times daily    losartan (COZAAR) 50 mg, oral, Daily    MULTIVITAMIN ORAL 1 capsule, Daily    omeprazole (PRILOSEC) 40 mg, oral, Daily before breakfast    tamsulosin (FLOMAX) 0.4 mg, oral, Daily, For prostate       ROS     Physical Exam:  Vitals:     "08/04/25 1047   BP: 100/61   BP Location: Right arm   Patient Position: Sitting   BP Cuff Size: Large adult   Pulse: 55   SpO2: 95%   Weight: 94.3 kg (208 lb)   Height: 1.74 m (5' 8.5\")     Wt Readings from Last 5 Encounters:   08/04/25 94.3 kg (208 lb)   06/06/25 96.7 kg (213 lb 3.2 oz)   06/03/25 95 kg (209 lb 8 oz)   04/10/25 95.7 kg (211 lb)   12/13/24 90.7 kg (200 lb)     Body mass index is 31.17 kg/m².   Physical Exam   Ill in no acute distress.  Skin was warm and dry.  Neck veins are flat.  Normal carotid upstrokes without bruits.  Regular rhythm gallop or murmur.  Clear lungs without crackles.  Soft abdomen without masses.  No dependent edema with intact pedal pulses  Last Labs:  CMP:  Recent Labs     05/12/25  0704 05/12/25  0702 11/09/24  0910 09/23/24  0729 08/23/24  0734    142 143 140 142   K 3.8 3.6 4.5 4.1 4.4    107 106 105 107   CO2 27 28 31 29 28   ANIONGAP 8 11 11 10 11   BUN 22 21 23 15 27*   CREATININE 0.81 0.74 0.77 0.84 0.74   EGFR 90 >90 >90 90 >90   GLUCOSE 95 93 89 96 94     Recent Labs     05/12/25  0704 05/12/25  0702 11/09/24  0910 09/23/24  0729 08/23/24  0734 05/01/24  0755   ALBUMIN 4.3 4.3 4.1  --  4.1 3.9   ALKPHOS 98 99 98  --  96 91   ALT 18 19 17 18 18 15   AST 19 18 17  --  17 16   BILITOT 0.7 0.7 0.4  --  0.3 0.4     CBC:  Recent Labs     05/12/25  0704 05/12/25  0702 11/09/24  0910 08/23/24  0734 05/01/24  0755   WBC 4.4 4.4 6.0 4.6 5.3   HGB 13.7 13.5 13.3* 12.6* 12.7*   HCT 41.6 40.8* 40.4* 38.6* 38.9*    228 252 247 268   MCV 96.1 94 96 96 95     COAG:   Recent Labs     05/01/24  0755   INR 0.9     HEME/ENDO:  Recent Labs     05/12/25  0704 11/15/23  0806 04/14/23  0937 06/24/22  0810 05/13/21  0818 11/12/20  1415   IRONSAT 30 32  --   --   --   --    TSH 3.19 2.62 1.54 2.93   < >  --    HGBA1C 5.7*  --   --   --   --  5.4    < > = values in this interval not displayed.      CARDIAC: No results for input(s): \"LDH\", \"CKMB\", \"TROPHS\", \"BNP\" in the last " "99029 hours.    No lab exists for component: \"CK\", \"CKMBP\"  Recent Labs     05/12/25  0704 09/23/24  0729 11/15/23  0806 04/14/23  0937 06/24/22  0810 04/08/22  0852   CHOL 156 159 149 159 133 142   LDLF  --   --   --  75 58 63   HDL 67 66.5 68.2 54.3 57.0 62.0   TRIG 110 145 100 151* 91 84       Last Cardiology Tests:  ECG:    ECG sinus bradycardia no acute ST or T wave changes  Echo:  Echo Results:  No results found for this or any previous visit from the past 3650 days.       Cath:      Stress Test:  Stress Results:  No results found for this or any previous visit from the past 365 days.         Cardiac Imaging:  NM PET CT prostate PSMA  Narrative: Interpreted By:  Vidya Hensley and Liller Gregory   STUDY:  NM PET CT PROSTATE PSMA;  5/29/2025 11:18 am      INDICATION:  Signs/Symptoms:rising PSA now 2pts above yolande. 2yrs out from RT tx.      ,C61 Malignant neoplasm of prostate (Multi)      COMPARISON:  MRI prostate 04/14/2023, PET-CT prostate 03/30/2023      ACCESSION NUMBER(S):  [id number]      ORDERING CLINICIAN:  TOMÁS CERVANTES      TECHNIQUE:  DIVISION OF NUCLEAR MEDICINE  POSITRON EMISSION TOMOGRAPHY (PET-CT)      The patient received an intravenous dose of 5.9 mCi of Fluorine-18  PSMA (DCFPyL). Positron emission tomographic (PET) images from  mid-thigh to skull vertex were then acquired after a delay of  approximately 60 minutes. Also acquired was a contemporaneous  noncontrast CT scan performed for attenuation correction of PET  images and anatomic localization.  The PET and CT images were  digitally fused for display.  All images were acquired on a combined  PET-CT scanner unit.  Some areas of PSMA accumulation may be  described in standardized uptake value (SUV) units.      FINDINGS:  NECK AND CHEST:  No abnormal focal F-18 PSMA uptake.  Physiologic activity is seen within the bilateral parotid and  submandibular glands.      ABDOMEN:  There is no abnormal F-18 PSMA uptake within abdominal lymph " nodes.  Intensely increased F-18 PSMA uptake within the liver and the spleen,  which is physiologic. Physiologic activity is noted within bilateral  kidneys. Physiologic tracer excretion into the small bowel.      PELVIS:  Multifocal areas of minimal increased F-18 PSMA uptake seen scattered  throughout the prostate gland with an SUV of 3, without focal intense  PSMA uptake. Fiducial markers are noted throughout the prostate  gland. Notably, the previous significant PSMA uptake was noted within  the left peripheral zone at an SUV max of 6.7 on prior PSMA PET-CT  from 03/30/2023. No significant focal area of intense PSMA uptake is  noted within this region. No abnormal F-18 PSMA uptake is seen within  periprostatic and pelvic lymph nodes.      MUSCULOSKELETAL:  There are no abnormal foci of increased F-18 PSMA activity seen  throughout the axial and appendicular skeleton.      Impression: 1. Mild heterogeneous PSMA expression throughout the prostate gland  without intense focal PSMA uptake to suggest recurrent disease.  Specifically, there is no intense PSMA avid tissue noted within the  left peripheral zone near the prostatic apex in the site of  previously described disease.  2. There is no evidence for F-18 PSMA-avid pelvic lymph node  metastasis.  3. There is no evidence for F-18 PSMA-avid distant metastatic disease.      I personally reviewed the images/study and I agree with the findings  as stated above by resident physician, Dr. Jose Norton.      MACRO:  None      Signed by: Vidya Hensley 5/29/2025 12:02 PM  Dictation workstation:   [id number]        Assessment/Plan       79-year-old male status post 2 LAD stents doing well clinically with no anginal complaints.  We discussed the pros and cons regarding treatment with GLP-1.  He will do some further research suggested Zepbound would be a reasonable agent.  I will see him back in annual follow-up but if he does start a GLP-1 I will see him back in 3  months    Orders:  No orders of the defined types were placed in this encounter.     Followup Appts:  Future Appointments   Date Time Provider Department Center   10/8/2025 10:30 AM Oral Castellano MD [id number] [place]   12/4/2025  8:00 AM Daren Hernández DO [id number] [place]   4/16/2026  1:00 PM Oral Castellano MD [id number] [place]   6/1/2026  2:30 PM Yan Jones, APRN-CNP [id number] [facility]           ____________________________________________________________  Pedro Lewis MD    Senior Attending Physician  [facility]         [1]   Past Medical History:  Diagnosis Date    Cancer (Multi) Apr 2023    Contact with and (suspected) exposure to other viral communicable diseases 12/30/2014    Exposure to influenza    Coronary artery disease     Dysphagia, unspecified 04/16/2013    Difficulty swallowing    Early cataracts, bilateral 04/18/2023    Hypertension     Hypokalemia 04/18/2023    Impacted cerumen, right ear 02/16/2017    Impacted cerumen of right ear    Obstructive sleep apnea (adult) (pediatric) 10/28/2015    Obstructive sleep apnea on CPAP    Other conditions influencing health status     Nephrolithiasis    Other enthesopathies, not elsewhere classified     Tendonitis of shoulder    Other fatigue     Tired    Other fracture of first lumbar vertebra, initial encounter for closed fracture (Multi)     Closed fracture of L1 vertebra    Other tear of medial meniscus, current injury, right knee, initial encounter 04/30/2024    Personal history of other diseases of the circulatory system 10/01/2015    History of hypertension    Personal history of other diseases of the musculoskeletal system and connective tissue 12/16/2014    History of low back pain    Personal history of other diseases of the nervous system and sense organs 02/06/2015    History of sleep apnea    Personal history of other diseases of the respiratory system     History of  allergic rhinitis    Personal history of other diseases of urinary system 10/28/2015    History of hematuria    Personal history of other specified conditions     History of edema    Residual hemorrhoidal skin tags 04/23/2014    External hemorrhoids    Snoring 05/16/2014    Snoring    Sprain of joints and ligaments of unspecified parts of neck, initial encounter 12/19/2014    Neck sprain    Unspecified visual loss     Vision problem    Vitamin D deficiency, unspecified     Vitamin D deficiency   [2]   Past Surgical History:  Procedure Laterality Date    CARDIAC CATHETERIZATION      COLONOSCOPY  11/28/2012    Complete Colonoscopy    ESOPHAGOGASTRODUODENOSCOPY  10/28/2016    Esophagogastroduodenoscopy With Biopsy    KNEE ARTHROSCOPY W/ DEBRIDEMENT  04/23/2014    Arthroscopy Knee Left    OTHER SURGICAL HISTORY  01/22/2020    Cataract surgery    OTHER SURGICAL HISTORY  02/04/2022    Esophagogastroduodenoscopy    OTHER SURGICAL HISTORY  11/19/2021    Cardiac catheterization with stent placement    UMBILICAL HERNIA REPAIR  11/28/2012    Umbilical Hernia Repair   [3] No Known Allergies

## 2025-08-05 LAB
ATRIAL RATE: 55 BPM
P AXIS: -1 DEGREES
P OFFSET: 203 MS
P ONSET: 142 MS
PR INTERVAL: 160 MS
Q ONSET: 222 MS
QRS COUNT: 9 BEATS
QRS DURATION: 90 MS
QT INTERVAL: 422 MS
QTC CALCULATION(BAZETT): 403 MS
QTC FREDERICIA: 410 MS
R AXIS: -10 DEGREES
T AXIS: 18 DEGREES
T OFFSET: 433 MS
VENTRICULAR RATE: 55 BPM

## 2025-08-06 ENCOUNTER — APPOINTMENT (OUTPATIENT)
Dept: CARDIOLOGY | Facility: CLINIC | Age: 80
End: 2025-08-06
Payer: MEDICARE

## 2025-08-23 DIAGNOSIS — N40.1 BENIGN PROSTATIC HYPERPLASIA WITH NOCTURIA: ICD-10-CM

## 2025-08-23 DIAGNOSIS — R35.1 BENIGN PROSTATIC HYPERPLASIA WITH NOCTURIA: ICD-10-CM

## 2025-08-24 DIAGNOSIS — K21.9 GASTROESOPHAGEAL REFLUX DISEASE WITHOUT ESOPHAGITIS: ICD-10-CM

## 2025-08-24 RX ORDER — OMEPRAZOLE 40 MG/1
CAPSULE, DELAYED RELEASE ORAL
Qty: 90 CAPSULE | Refills: 3 | Status: SHIPPED | OUTPATIENT
Start: 2025-08-24

## 2025-08-24 RX ORDER — TAMSULOSIN HYDROCHLORIDE 0.4 MG/1
0.4 CAPSULE ORAL DAILY
Qty: 90 CAPSULE | Refills: 3 | Status: SHIPPED | OUTPATIENT
Start: 2025-08-24

## 2025-10-08 ENCOUNTER — APPOINTMENT (OUTPATIENT)
Dept: PRIMARY CARE | Facility: CLINIC | Age: 80
End: 2025-10-08
Payer: MEDICARE

## 2025-12-04 ENCOUNTER — APPOINTMENT (OUTPATIENT)
Facility: CLINIC | Age: 80
End: 2025-12-04
Payer: MEDICARE

## 2026-04-16 ENCOUNTER — APPOINTMENT (OUTPATIENT)
Dept: PRIMARY CARE | Facility: CLINIC | Age: 81
End: 2026-04-16
Payer: MEDICARE

## (undated) DEVICE — BANDAGE, ESMARK, 6 IN X 12 FT

## (undated) DEVICE — BANDAGE, COFLEX, 6 X 5 YDS, FOAM TAN, STERILE, LF

## (undated) DEVICE — GLOVE, SURGICAL, PROTEXIS PI BLUE W/NEUTHERA, 7.5, PF, LF

## (undated) DEVICE — PADDING, WEBRIL, UNDERCAST, STERILE, 6 IN

## (undated) DEVICE — TOWEL PACK, STERILE, 4/PACK, BLUE

## (undated) DEVICE — DRESSING, GAUZE, SUPER KERLIX, 6X6

## (undated) DEVICE — STRIP, SKIN CLOSURE, STERI STRIP, REINFORCED, 0.5 X 4 IN

## (undated) DEVICE — GLOVE, SURGICAL, PROTEXIS PI , 7.5, PF, LF

## (undated) DEVICE — NEEDLE, HYPODERMIC, SPECIALTY, REGULAR WALL, SHORT BEVEL, 18 G X 1.5 IN

## (undated) DEVICE — TUBING, PUMP MAIN 16FT STERILE

## (undated) DEVICE — APPLICATOR, CHLORAPREP, W/ORANGE TINT, 26ML

## (undated) DEVICE — DRESSING, ABDOMINAL, WET PRUF, TENDERSORB, 5 X 9 IN, STERILE

## (undated) DEVICE — BLADE, STRYKER, 4.0MM, AGG PLUS SHVBLD ULTMT

## (undated) DEVICE — DRESSING, GAUZE, PETROLATUM, PATCH, XEROFORM, 1 X 8 IN, STERILE

## (undated) DEVICE — SUTURE, MONOCRYL, 3-0, 27 IN, PS-2, UNDYED

## (undated) DEVICE — BANDAGE, ELASTIC, MATRIX, SELF-CLOSURE, 6 IN X 5 YD, LF

## (undated) DEVICE — Device

## (undated) DEVICE — GLOVE, SURGICAL, PROTEXIS PI W/NEU-THERA, 8.0, PF, LF